# Patient Record
Sex: MALE | Race: WHITE | NOT HISPANIC OR LATINO | URBAN - METROPOLITAN AREA
[De-identification: names, ages, dates, MRNs, and addresses within clinical notes are randomized per-mention and may not be internally consistent; named-entity substitution may affect disease eponyms.]

---

## 2017-03-09 ENCOUNTER — ALLSCRIPTS OFFICE VISIT (OUTPATIENT)
Dept: OTHER | Facility: OTHER | Age: 42
End: 2017-03-09

## 2017-06-07 ENCOUNTER — ALLSCRIPTS OFFICE VISIT (OUTPATIENT)
Dept: OTHER | Facility: OTHER | Age: 42
End: 2017-06-07

## 2017-09-05 ENCOUNTER — ALLSCRIPTS OFFICE VISIT (OUTPATIENT)
Dept: OTHER | Facility: OTHER | Age: 42
End: 2017-09-05

## 2017-09-18 ENCOUNTER — ALLSCRIPTS OFFICE VISIT (OUTPATIENT)
Dept: OTHER | Facility: OTHER | Age: 42
End: 2017-09-18

## 2018-01-10 NOTE — MISCELLANEOUS
Provider Comments  Provider Comments:   PT MISSED APPOINTMENT 6/7/2017 WITH DR Ciara Huang      Signatures   Electronically signed by : Roseann Negrete, ; Jun 7 2017 11:56AM EST                       (Author)

## 2018-01-12 NOTE — MISCELLANEOUS
Provider Comments  Provider Comments:   PT MISSED APPOINTMENT 9/05/2017 WITH DR Sharon Espinal   Electronically signed by : Evette Moore, ; Sep  5 2017  9:18AM EST                       (Author)

## 2018-01-13 VITALS
HEIGHT: 69 IN | TEMPERATURE: 98 F | BODY MASS INDEX: 32.58 KG/M2 | SYSTOLIC BLOOD PRESSURE: 120 MMHG | WEIGHT: 220 LBS | DIASTOLIC BLOOD PRESSURE: 80 MMHG

## 2018-01-16 NOTE — PROGRESS NOTES
Assessment    1  Benign essential hypertension (401 1) (I10)   2  Hypercholesterolemia (272 0) (E78 00)   3  Insomnia (780 52) (G47 00)   4  IBS (irritable bowel syndrome) (564 1) (K58 9)   5  Anxiety (300 00) (F41 9)    Plan  Abdominal pain    · Omeprazole 40 MG Oral Capsule Delayed Release; TAKE 1 CAPSULE DAILY  Anxiety, Benign essential hypertension, Hypercholesterolemia, IBS (irritable bowel  syndrome), Insomnia    · Venlafaxine HCl ER 37 5 MG Oral Capsule Extended Release 24 Hour (Effexor XR);  TAKE 1 CAPSULE ONCE DAILY WITH FOOD   · Follow-up visit in 3 months Evaluation and Treatment  Follow-up  Status: Hold For -  Scheduling  Requested for: [id number]  Benign essential hypertension    · Olmesartan Medoxomil 40 MG Oral Tablet (Benicar); Take 1 tablet daily  Insomnia    · Belsomra 10 MG Oral Tablet; TAKE 1 TABLET AT  BEDTIME AS NEEDED FOR  INSOMNIA  Performance anxiety    · From  ALPRAZolam 1 MG Oral Tablet TAKE 1 TABLET TWICE DAILY To  ALPRAZolam 1 MG Oral Tablet TAKE 1 TABLET 3 TIMES DAILY    Chief Complaint  REG F/U ANXIETY, HTN, HYPERLIPIDS  PT WISHES TO DISCUSS MEDS  History of Present Illness  Pt  is here for anxiety and htn and hld  Pt  with insomnia also  Patient also here to follow-up on GERD  Which is stable  No gout related issues  No chest pain or shortness of breath or headache or visual disturbance  Patient with IBS  Patient has noticed bloating  Patient has had EGD and colonoscopy in the past which were unremarkable  Normal urination  Review of Systems    Constitutional: No fever or chills, feels well, no tiredness, no recent weight gain or weight loss  Eyes: No complaints of eye pain, no red eyes, no discharge from eyes, no itchy eyes  ENT: no complaints of earache, no hearing loss, no nosebleeds, no nasal discharge, no sore throat, no hoarseness     Cardiovascular: No complaints of slow heart rate, no fast heart rate, no chest pain, no palpitations, no leg claudication, no lower extremity  Respiratory: No complaints of shortness of breath, no wheezing, no cough, no SOB on exertion, no orthopnea or PND  Gastrointestinal: as noted in HPI  Genitourinary: No complaints of dysuria, no incontinence, no hesitancy, no nocturia, no genital lesion, no testicular pain  Musculoskeletal: No complaints of arthralgia, no myalgias, no joint swelling or stiffness, no limb pain or swelling  Integumentary: No complaints of skin rash or skin lesions, no itching, no skin wound, no dry skin  Neurological: No compliants of headache, no confusion, no convulsions, no numbness or tingling, no dizziness or fainting, no limb weakness, no difficulty walking  Psychiatric: anxiety, but as noted in HPI  Endocrine: No complaints of proptosis, no hot flashes, no muscle weakness, no erectile dysfunction, no deepening of the voice, no feelings of weakness  Hematologic/Lymphatic: No complaints of swollen glands, no swollen glands in the neck, does not bleed easily, no easy bruising  Active Problems    1  Abdominal pain (789 00) (R10 9)   2  Acute gastritis (535 00) (K29 00)   3  Acute sinusitis (461 9) (J01 90)   4  Anxiety (300 00) (F41 9)   5  Benign essential hypertension (401 1) (I10)   6  Fatigue (780 79) (R53 83)   7  Hypercholesterolemia (272 0) (E78 00)   8  Incisional hernia (553 21) (K43 2)   9  Multiple Ventricular Septal Defects (745 4)   10  Performance anxiety (300 09) (F41 8)   11  Upper respiratory infection (465 9) (J06 9)    Past Medical History    The active problems and past medical history were reviewed and updated today  Family History  Mother    1  No pertinent family history    Social History    · Never A Smoker    Current Meds   1  ALPRAZolam 1 MG Oral Tablet; TAKE 1 TABLET TWICE DAILY; Therapy: [id number] to ([facility])  Requested for: 28TTD1011; Last   Rx:[id number]; Status: ACTIVE - Renewal Denied Ordered   2  Olmesartan Medoxomil 40 MG Oral Tablet;  Take 1 tablet daily; Therapy: 71NNQ5382 to (Evaluate:06Jun2017)  Requested for: 94NYJ2504; Last   Rx:[id number] Ordered   3  Omeprazole 40 MG Oral Capsule Delayed Release; TAKE 1 CAPSULE DAILY; Therapy: [id number] to ([facility])  Requested for: 15CVG3907; Last   Rx:[id number] Ordered    The medication list was reviewed and updated today  Allergies    1  No Known Drug Allergies    Vitals  Vital Signs    Recorded: 77QJJ2329 61:02SB   Systolic 001   Diastolic 80   BP CUFF SIZE Large   Height 5 ft 9 in   Weight 226 lb    BMI Calculated 33 37   BSA Calculated 2 18     Physical Exam    Constitutional   General appearance: No acute distress, well appearing and well nourished  Eyes   Conjunctiva and lids: No swelling, erythema, or discharge  Pupils and irises: Equal, round and reactive to light  Ears, Nose, Mouth, and Throat   External inspection of ears and nose: Normal     Otoscopic examination: Tympanic membrance translucent with normal light reflex  Canals patent without erythema  Nasal mucosa, septum, and turbinates: Normal without edema or erythema  Oropharynx: Normal with no erythema, edema, exudate or lesions  Pulmonary   Respiratory effort: No increased work of breathing or signs of respiratory distress  Auscultation of lungs: Clear to auscultation, equal breath sounds bilaterally, no wheezes, no rales, no rhonci  Cardiovascular   Palpation of heart: Normal PMI, no thrills  Auscultation of heart: Normal rate and rhythm, normal S1 and S2, without murmurs  Examination of extremities for edema and/or varicosities: Normal     Carotid pulses: Normal     Abdomen   Abdomen: Non-tender, no masses  Liver and spleen: No hepatomegaly or splenomegaly  Lymphatic   Palpation of lymph nodes in neck: No lymphadenopathy  Musculoskeletal   Gait and station: Normal     Digits and nails: Normal without clubbing or cyanosis      Inspection/palpation of joints, bones, and muscles: Normal     Skin Skin and subcutaneous tissue: Normal without rashes or lesions  Neurologic   Cranial nerves: Cranial nerves 2-12 intact  Reflexes: 2+ and symmetric  Sensation: No sensory loss      Psychiatric   Orientation to person, place and time: Normal     Mood and affect: Normal          Signatures   Electronically signed by : Radha Owusu DO; Mar  9 2017 11:05AM EST                       (Author)

## 2018-01-22 VITALS
DIASTOLIC BLOOD PRESSURE: 80 MMHG | WEIGHT: 226 LBS | HEIGHT: 69 IN | BODY MASS INDEX: 33.47 KG/M2 | SYSTOLIC BLOOD PRESSURE: 120 MMHG

## 2018-03-08 DIAGNOSIS — F41.9 ANXIETY: Primary | ICD-10-CM

## 2018-03-08 RX ORDER — ALPRAZOLAM 1 MG/1
1 TABLET ORAL 3 TIMES DAILY
COMMUNITY
Start: 2016-07-06 | End: 2018-03-08 | Stop reason: SDUPTHER

## 2018-03-08 RX ORDER — ALPRAZOLAM 1 MG/1
TABLET ORAL
Qty: 90 TABLET | Refills: 4 | OUTPATIENT
Start: 2018-03-08

## 2018-03-08 RX ORDER — ALPRAZOLAM 1 MG/1
1 TABLET ORAL 3 TIMES DAILY
Qty: 90 TABLET | Refills: 0 | OUTPATIENT
Start: 2018-03-08 | End: 2018-03-09 | Stop reason: SDUPTHER

## 2018-03-09 DIAGNOSIS — F41.9 ANXIETY: ICD-10-CM

## 2018-03-09 RX ORDER — ALPRAZOLAM 1 MG/1
1 TABLET ORAL 3 TIMES DAILY
Qty: 90 TABLET | Refills: 0 | OUTPATIENT
Start: 2018-03-09 | End: 2018-04-02 | Stop reason: SDUPTHER

## 2018-03-30 RX ORDER — GABAPENTIN 300 MG/1
1 CAPSULE ORAL
COMMUNITY
Start: 2017-09-18 | End: 2018-04-02

## 2018-03-30 RX ORDER — OMEPRAZOLE 40 MG/1
1 CAPSULE, DELAYED RELEASE ORAL DAILY
COMMUNITY
Start: 2016-07-06 | End: 2018-04-02 | Stop reason: SDUPTHER

## 2018-03-30 RX ORDER — OLMESARTAN MEDOXOMIL 40 MG/1
1 TABLET ORAL DAILY
COMMUNITY
Start: 2017-09-22 | End: 2018-04-02 | Stop reason: SDUPTHER

## 2018-04-02 ENCOUNTER — OFFICE VISIT (OUTPATIENT)
Dept: FAMILY MEDICINE CLINIC | Facility: CLINIC | Age: 43
End: 2018-04-02
Payer: COMMERCIAL

## 2018-04-02 VITALS
DIASTOLIC BLOOD PRESSURE: 76 MMHG | HEART RATE: 77 BPM | SYSTOLIC BLOOD PRESSURE: 118 MMHG | WEIGHT: 215 LBS | BODY MASS INDEX: 30.78 KG/M2 | OXYGEN SATURATION: 98 % | HEIGHT: 70 IN | TEMPERATURE: 98.1 F

## 2018-04-02 DIAGNOSIS — I10 BENIGN ESSENTIAL HYPERTENSION: Primary | ICD-10-CM

## 2018-04-02 DIAGNOSIS — F41.9 ANXIETY: ICD-10-CM

## 2018-04-02 DIAGNOSIS — K29.00 ACUTE SUPERFICIAL GASTRITIS WITHOUT HEMORRHAGE: ICD-10-CM

## 2018-04-02 PROCEDURE — 99214 OFFICE O/P EST MOD 30 MIN: CPT | Performed by: FAMILY MEDICINE

## 2018-04-02 RX ORDER — ALPRAZOLAM 1 MG/1
1 TABLET ORAL 3 TIMES DAILY
Qty: 270 TABLET | Refills: 1 | OUTPATIENT
Start: 2018-04-02 | End: 2018-04-02 | Stop reason: SDUPTHER

## 2018-04-02 RX ORDER — OLMESARTAN MEDOXOMIL 40 MG/1
40 TABLET ORAL DAILY
Qty: 90 TABLET | Refills: 1 | Status: SHIPPED | OUTPATIENT
Start: 2018-04-02 | End: 2019-12-31 | Stop reason: CLARIF

## 2018-04-02 RX ORDER — ALPRAZOLAM 1 MG/1
1 TABLET ORAL 3 TIMES DAILY
Qty: 270 TABLET | Refills: 0 | Status: SHIPPED | OUTPATIENT
Start: 2018-04-02 | End: 2018-06-27 | Stop reason: SDUPTHER

## 2018-04-02 RX ORDER — OMEPRAZOLE 40 MG/1
40 CAPSULE, DELAYED RELEASE ORAL DAILY
Qty: 90 CAPSULE | Refills: 1 | Status: SHIPPED | OUTPATIENT
Start: 2018-04-02 | End: 2018-10-04 | Stop reason: SDUPTHER

## 2018-04-02 RX ORDER — OLMESARTAN MEDOXOMIL 40 MG/1
40 TABLET ORAL DAILY
Qty: 90 TABLET | Refills: 1 | Status: SHIPPED | OUTPATIENT
Start: 2018-04-02 | End: 2018-04-02 | Stop reason: SDUPTHER

## 2018-04-02 NOTE — PROGRESS NOTES
Assessment/Plan:   refills on all medication given  Patient doing well overall  Patient will follow up in 6 months  No problem-specific Assessment & Plan notes found for this encounter  Diagnoses and all orders for this visit:    Benign essential hypertension  -     olmesartan (BENICAR) 40 mg tablet; Take 1 tablet (40 mg total) by mouth daily    Anxiety  -     ALPRAZolam (XANAX) 1 mg tablet; Take 1 tablet (1 mg total) by mouth 3 (three) times a day for 30 days    Acute superficial gastritis without hemorrhage  -     omeprazole (PriLOSEC) 40 MG capsule; Take 1 capsule (40 mg total) by mouth daily    Other orders  -     Discontinue: gabapentin (NEURONTIN) 300 mg capsule; Take 1 capsule by mouth  -     Discontinue: olmesartan (BENICAR) 40 mg tablet; Take 1 tablet by mouth daily  -     Discontinue: omeprazole (PriLOSEC) 40 MG capsule; Take 1 capsule by mouth daily          Subjective:      Patient ID: Alexander Ritter is a 37 y o  male  Patient follow-up on hypertension and anxiety and gastritis  No chest pain or shortness of breath or abdominal pain  Patient does use roughly 3-4 Xanax tablets daily  No change in urination or defecation  All review systems negative  The following portions of the patient's history were reviewed and updated as appropriate: allergies, current medications, past family history, past medical history, past social history, past surgical history and problem list     Review of Systems   Constitutional: Negative  HENT: Negative  Eyes: Negative  Respiratory: Negative  Cardiovascular: Negative  Gastrointestinal: Negative  Endocrine: Negative  Genitourinary: Negative  Musculoskeletal: Negative  Skin: Negative  Allergic/Immunologic: Negative  Neurological: Negative  Hematological: Negative  Psychiatric/Behavioral: Negative            Objective:      /76 (BP Location: Right arm, Patient Position: Sitting, Cuff Size: Large)   Pulse 77 Temp 98 1 °F (36 7 °C) (Tympanic)   Ht 5' 10" (1 778 m)   Wt 97 5 kg (215 lb)   SpO2 98%   BMI 30 85 kg/m²          Physical Exam   Constitutional: He is oriented to person, place, and time  He appears well-developed and well-nourished  No distress  HENT:   Head: Normocephalic  Right Ear: External ear normal    Left Ear: External ear normal    Mouth/Throat: Oropharynx is clear and moist  No oropharyngeal exudate  Eyes: EOM are normal  Pupils are equal, round, and reactive to light  Right eye exhibits no discharge  Left eye exhibits no discharge  No scleral icterus  Neck: Normal range of motion  Neck supple  No thyromegaly present  Cardiovascular: Normal rate, regular rhythm, normal heart sounds and intact distal pulses  Exam reveals no gallop and no friction rub  No murmur heard  Pulmonary/Chest: Effort normal and breath sounds normal  No respiratory distress  He has no wheezes  He has no rales  He exhibits no tenderness  Abdominal: Soft  Bowel sounds are normal  He exhibits no distension  There is no tenderness  There is no rebound and no guarding  Musculoskeletal: Normal range of motion  He exhibits no edema or tenderness  Lymphadenopathy:     He has no cervical adenopathy  Neurological: He is oriented to person, place, and time  No cranial nerve deficit  He exhibits normal muscle tone  Coordination normal    Skin: Skin is warm and dry  No rash noted  He is not diaphoretic  No erythema  No pallor  Psychiatric: He has a normal mood and affect  His behavior is normal  Judgment and thought content normal    Nursing note and vitals reviewed

## 2018-06-27 ENCOUNTER — OFFICE VISIT (OUTPATIENT)
Dept: FAMILY MEDICINE CLINIC | Facility: CLINIC | Age: 43
End: 2018-06-27
Payer: COMMERCIAL

## 2018-06-27 VITALS
WEIGHT: 214.6 LBS | HEIGHT: 70 IN | TEMPERATURE: 97.6 F | SYSTOLIC BLOOD PRESSURE: 118 MMHG | DIASTOLIC BLOOD PRESSURE: 80 MMHG | BODY MASS INDEX: 30.72 KG/M2

## 2018-06-27 DIAGNOSIS — F41.9 ANXIETY: ICD-10-CM

## 2018-06-27 DIAGNOSIS — A08.4 VIRAL GASTROENTERITIS: ICD-10-CM

## 2018-06-27 DIAGNOSIS — K29.00 ACUTE SUPERFICIAL GASTRITIS WITHOUT HEMORRHAGE: Primary | ICD-10-CM

## 2018-06-27 PROCEDURE — 99213 OFFICE O/P EST LOW 20 MIN: CPT | Performed by: FAMILY MEDICINE

## 2018-06-27 RX ORDER — ALPRAZOLAM 1 MG/1
TABLET ORAL
Qty: 90 TABLET | Refills: 0 | OUTPATIENT
Start: 2018-06-27

## 2018-06-27 RX ORDER — ALPRAZOLAM 1 MG/1
1 TABLET ORAL 3 TIMES DAILY
Qty: 270 TABLET | Refills: 1 | Status: SHIPPED | OUTPATIENT
Start: 2018-06-27 | End: 2018-12-10 | Stop reason: SDUPTHER

## 2018-06-27 RX ORDER — ONDANSETRON 4 MG/1
4 TABLET, ORALLY DISINTEGRATING ORAL EVERY 6 HOURS PRN
Qty: 20 TABLET | Refills: 1 | Status: SHIPPED | OUTPATIENT
Start: 2018-06-27 | End: 2019-01-09

## 2018-06-27 NOTE — PROGRESS NOTES
Assessment/Plan:  Patient will use Zofran as needed for nausea  Guidance given  Patient will have Xanax refilled regarding ongoing history of anxiety  Patient will see GI if not improving  Follow-up in 6 months       Diagnoses and all orders for this visit:    Acute superficial gastritis without hemorrhage    Anxiety  -     ALPRAZolam (XANAX) 1 mg tablet; Take 1 tablet (1 mg total) by mouth 3 (three) times a day for 90 days    Viral gastroenteritis  -     ondansetron (ZOFRAN-ODT) 4 mg disintegrating tablet; Take 1 tablet (4 mg total) by mouth every 6 (six) hours as needed for nausea or vomiting          Subjective:      Patient ID: Laila Owens is a 37 y o  male  Patient is here to follow-up on nausea for which began over the weekend  Patient with significant belching  No vomiting  The patient also had diarrhea  Patient ran out of Xanax over the weekend also  Patient has left for roughly 30 hr   Patient received 2 doses of Zofran  This is helped  No significant abdominal pain  No chest pain or shortness of breath  The patient also with history of anxiety and uses Xanax 3 times daily routinely  Refills needed  The patient with history of IBS  The following portions of the patient's history were reviewed and updated as appropriate: allergies, current medications, past family history, past medical history, past social history, past surgical history and problem list     Review of Systems   Constitutional: Negative  HENT: Negative  Eyes: Negative  Respiratory: Negative  Cardiovascular: Negative  Gastrointestinal: Positive for diarrhea and nausea  Negative for abdominal pain and vomiting  Endocrine: Negative  Genitourinary: Negative  Musculoskeletal: Negative  Skin: Negative  Allergic/Immunologic: Negative  Neurological: Negative  Hematological: Negative  Psychiatric/Behavioral: Negative            Objective:      /80 (BP Location: Left arm, Patient Position: Sitting, Cuff Size: Adult)   Temp 97 6 °F (36 4 °C) (Tympanic)   Ht 5' 9 5" (1 765 m)   Wt 97 3 kg (214 lb 9 6 oz)   BMI 31 24 kg/m²          Physical Exam   Constitutional: He is oriented to person, place, and time  He appears well-developed and well-nourished  No distress  HENT:   Head: Normocephalic  Right Ear: External ear normal    Left Ear: External ear normal    Mouth/Throat: Oropharynx is clear and moist  No oropharyngeal exudate  Eyes: EOM are normal  Pupils are equal, round, and reactive to light  Right eye exhibits no discharge  Left eye exhibits no discharge  No scleral icterus  Neck: Normal range of motion  Neck supple  No thyromegaly present  Cardiovascular: Normal rate, regular rhythm, normal heart sounds and intact distal pulses  Exam reveals no gallop and no friction rub  No murmur heard  Pulmonary/Chest: Effort normal and breath sounds normal  No respiratory distress  He has no wheezes  He has no rales  He exhibits no tenderness  Abdominal: Soft  Bowel sounds are normal  He exhibits no distension  There is no tenderness  There is no rebound and no guarding  Musculoskeletal: Normal range of motion  He exhibits no edema or tenderness  Lymphadenopathy:     He has no cervical adenopathy  Neurological: He is oriented to person, place, and time  No cranial nerve deficit  He exhibits normal muscle tone  Coordination normal    Skin: Skin is warm and dry  No rash noted  He is not diaphoretic  No erythema  No pallor  Psychiatric: He has a normal mood and affect  His behavior is normal  Judgment and thought content normal    Nursing note and vitals reviewed

## 2018-08-06 ENCOUNTER — OFFICE VISIT (OUTPATIENT)
Dept: FAMILY MEDICINE CLINIC | Facility: CLINIC | Age: 43
End: 2018-08-06
Payer: COMMERCIAL

## 2018-08-06 VITALS
RESPIRATION RATE: 14 BRPM | HEIGHT: 70 IN | DIASTOLIC BLOOD PRESSURE: 60 MMHG | BODY MASS INDEX: 30.69 KG/M2 | HEART RATE: 71 BPM | SYSTOLIC BLOOD PRESSURE: 106 MMHG | WEIGHT: 214.4 LBS | OXYGEN SATURATION: 99 %

## 2018-08-06 DIAGNOSIS — K21.00 GASTROESOPHAGEAL REFLUX DISEASE WITH ESOPHAGITIS: Primary | ICD-10-CM

## 2018-08-06 DIAGNOSIS — F41.9 ANXIETY: ICD-10-CM

## 2018-08-06 DIAGNOSIS — I10 BENIGN ESSENTIAL HYPERTENSION: ICD-10-CM

## 2018-08-06 DIAGNOSIS — K58.0 IRRITABLE BOWEL SYNDROME WITH DIARRHEA: ICD-10-CM

## 2018-08-06 PROCEDURE — 1036F TOBACCO NON-USER: CPT | Performed by: FAMILY MEDICINE

## 2018-08-06 PROCEDURE — 3008F BODY MASS INDEX DOCD: CPT | Performed by: FAMILY MEDICINE

## 2018-08-06 PROCEDURE — 3074F SYST BP LT 130 MM HG: CPT | Performed by: FAMILY MEDICINE

## 2018-08-06 PROCEDURE — 99214 OFFICE O/P EST MOD 30 MIN: CPT | Performed by: FAMILY MEDICINE

## 2018-08-06 PROCEDURE — 3078F DIAST BP <80 MM HG: CPT | Performed by: FAMILY MEDICINE

## 2018-08-06 RX ORDER — SERTRALINE HYDROCHLORIDE 100 MG/1
100 TABLET, FILM COATED ORAL DAILY
Qty: 90 TABLET | Refills: 0 | Status: SHIPPED | OUTPATIENT
Start: 2018-08-06 | End: 2019-01-09

## 2018-08-06 NOTE — PROGRESS NOTES
Assessment/Plan:      Started Zoloft today to see if that helps his symptoms of anxiety and IBS  I referred him to GI for evaluation possible EGD and colonoscopy  I will let them decide on medication for the IBS  Follow-up with Dr Alexandrea Vasquez in 2 weeks  Diagnoses and all orders for this visit:    Gastroesophageal reflux disease with esophagitis  -     Ambulatory referral to Gastroenterology; Future    Benign essential hypertension    Anxiety  -     sertraline (ZOLOFT) 100 mg tablet; Take 1 tablet (100 mg total) by mouth daily for 90 days    Irritable bowel syndrome with diarrhea  -     Ambulatory referral to Gastroenterology; Future          Subjective:      Patient ID: Sandip Sanchez is a 37 y o  male  Margy Carlson in today complains of recurrent abdominal discomfort  Is waking him from sleep he says he feels cold and clammy but does not have a fever  The symptoms required and he was sent home from work  Does complain of nausea but no vomiting  He does suffer from IBS and has diarrhea  The following portions of the patient's history were reviewed and updated as appropriate: allergies, current medications, past family history, past medical history, past social history, past surgical history and problem list     Review of Systems   Constitutional: Positive for activity change, appetite change and chills  Negative for fever and unexpected weight change  HENT: Negative  Eyes: Negative  Respiratory: Negative  Cardiovascular: Negative  Gastrointestinal: Positive for diarrhea and nausea  Negative for abdominal distention, abdominal pain, anal bleeding, blood in stool, constipation, rectal pain and vomiting  Genitourinary: Negative  Musculoskeletal: Negative  Neurological: Negative  Psychiatric/Behavioral: The patient is nervous/anxious            Objective:      /60 (BP Location: Right arm, Patient Position: Sitting, Cuff Size: Large)   Pulse 71   Resp 14   Ht 5' 9 5" (1 765 m) Wt 97 3 kg (214 lb 6 4 oz)   SpO2 99%   BMI 31 21 kg/m²          Physical Exam   Constitutional: He is oriented to person, place, and time  He appears well-developed and well-nourished  HENT:   Head: Normocephalic and atraumatic  Right Ear: External ear normal    Left Ear: External ear normal    Nose: Nose normal    Mouth/Throat: Oropharynx is clear and moist    Eyes: Conjunctivae and EOM are normal  Pupils are equal, round, and reactive to light  Neck: Normal range of motion  Neck supple  Cardiovascular: Normal rate, regular rhythm and normal heart sounds  Pulmonary/Chest: Effort normal and breath sounds normal    Abdominal: Soft  Bowel sounds are normal  He exhibits distension  He exhibits no mass  There is tenderness  There is no rebound and no guarding  Musculoskeletal: Normal range of motion  Neurological: He is alert and oriented to person, place, and time  He has normal reflexes  Skin: Skin is warm and dry  Psychiatric: He has a normal mood and affect  His behavior is normal    Nursing note and vitals reviewed

## 2018-08-06 NOTE — LETTER
August 6, 2018     Patient: Noble Hickey   YOB: 1975   Date of Visit: 8/6/2018       To Whom it May Concern:    Papito Alexandre is under my professional care  He was seen in my office on 8/6/2018  He may return to work on Tuesday August 7, 2018  If you have any questions or concerns, please don't hesitate to call           Sincerely,          Isom Severin, DO        CC: No Recipients

## 2018-10-04 DIAGNOSIS — K29.00 ACUTE SUPERFICIAL GASTRITIS WITHOUT HEMORRHAGE: ICD-10-CM

## 2018-10-05 RX ORDER — OMEPRAZOLE 40 MG/1
CAPSULE, DELAYED RELEASE ORAL
Qty: 30 CAPSULE | Refills: 0 | Status: SHIPPED | OUTPATIENT
Start: 2018-10-05 | End: 2018-11-22 | Stop reason: SDUPTHER

## 2018-11-22 DIAGNOSIS — K29.00 ACUTE SUPERFICIAL GASTRITIS WITHOUT HEMORRHAGE: ICD-10-CM

## 2018-11-23 RX ORDER — OMEPRAZOLE 40 MG/1
CAPSULE, DELAYED RELEASE ORAL
Qty: 30 CAPSULE | Refills: 0 | Status: SHIPPED | OUTPATIENT
Start: 2018-11-23 | End: 2019-01-03 | Stop reason: SDUPTHER

## 2018-12-10 DIAGNOSIS — F41.9 ANXIETY: ICD-10-CM

## 2018-12-10 RX ORDER — ALPRAZOLAM 1 MG/1
TABLET ORAL
Qty: 90 TABLET | Refills: 0 | Status: SHIPPED | OUTPATIENT
Start: 2018-12-10 | End: 2019-01-09 | Stop reason: SDUPTHER

## 2019-01-03 DIAGNOSIS — K29.00 ACUTE SUPERFICIAL GASTRITIS WITHOUT HEMORRHAGE: ICD-10-CM

## 2019-01-03 RX ORDER — OMEPRAZOLE 40 MG/1
CAPSULE, DELAYED RELEASE ORAL
Qty: 30 CAPSULE | Refills: 0 | Status: SHIPPED | OUTPATIENT
Start: 2019-01-03 | End: 2019-02-12 | Stop reason: SDUPTHER

## 2019-01-09 ENCOUNTER — OFFICE VISIT (OUTPATIENT)
Dept: FAMILY MEDICINE CLINIC | Facility: CLINIC | Age: 44
End: 2019-01-09
Payer: COMMERCIAL

## 2019-01-09 VITALS
DIASTOLIC BLOOD PRESSURE: 72 MMHG | WEIGHT: 202 LBS | SYSTOLIC BLOOD PRESSURE: 112 MMHG | HEIGHT: 70 IN | BODY MASS INDEX: 28.92 KG/M2 | TEMPERATURE: 98.6 F

## 2019-01-09 DIAGNOSIS — K29.00 ACUTE SUPERFICIAL GASTRITIS WITHOUT HEMORRHAGE: ICD-10-CM

## 2019-01-09 DIAGNOSIS — K58.0 IRRITABLE BOWEL SYNDROME WITH DIARRHEA: ICD-10-CM

## 2019-01-09 DIAGNOSIS — I10 BENIGN ESSENTIAL HYPERTENSION: Primary | ICD-10-CM

## 2019-01-09 DIAGNOSIS — F41.9 ANXIETY: ICD-10-CM

## 2019-01-09 PROCEDURE — 1036F TOBACCO NON-USER: CPT | Performed by: FAMILY MEDICINE

## 2019-01-09 PROCEDURE — 3008F BODY MASS INDEX DOCD: CPT | Performed by: FAMILY MEDICINE

## 2019-01-09 PROCEDURE — 3078F DIAST BP <80 MM HG: CPT | Performed by: FAMILY MEDICINE

## 2019-01-09 PROCEDURE — 99214 OFFICE O/P EST MOD 30 MIN: CPT | Performed by: FAMILY MEDICINE

## 2019-01-09 PROCEDURE — 3074F SYST BP LT 130 MM HG: CPT | Performed by: FAMILY MEDICINE

## 2019-01-09 RX ORDER — DULOXETIN HYDROCHLORIDE 30 MG/1
30 CAPSULE, DELAYED RELEASE ORAL DAILY
Qty: 30 CAPSULE | Refills: 5 | Status: SHIPPED | OUTPATIENT
Start: 2019-01-09 | End: 2019-04-26

## 2019-01-09 RX ORDER — ONDANSETRON 4 MG/1
4 TABLET, FILM COATED ORAL EVERY 8 HOURS PRN
Qty: 30 TABLET | Refills: 0 | Status: SHIPPED | OUTPATIENT
Start: 2019-01-09 | End: 2019-05-13 | Stop reason: SDUPTHER

## 2019-01-09 RX ORDER — ALPRAZOLAM 1 MG/1
1 TABLET ORAL 4 TIMES DAILY PRN
Qty: 120 TABLET | Refills: 2 | Status: SHIPPED | OUTPATIENT
Start: 2019-01-09 | End: 2019-04-22 | Stop reason: SDUPTHER

## 2019-01-09 NOTE — PROGRESS NOTES
Assessment/Plan:  Patient will see GI on Monday for gastritis and IBS  Patient will use Zofran as needed  Refills given  Patient will continue with Benicar for hypertension which is stable at the present time  May need to change medication in the near future  Patient have Xanax increased to 4 times daily  The patient will start Cymbalta  Diagnoses and all orders for this visit:    Benign essential hypertension  -     CBC and differential; Future  -     Comprehensive metabolic panel; Future  -     Lipid panel; Future  -     TSH, 3rd generation with Free T4 reflex; Future    Anxiety  -     ALPRAZolam (XANAX) 1 mg tablet; Take 1 tablet (1 mg total) by mouth 4 (four) times a day as needed for anxiety  -     DULoxetine (CYMBALTA) 30 mg delayed release capsule; Take 1 capsule (30 mg total) by mouth daily    Irritable bowel syndrome with diarrhea  -     ondansetron (ZOFRAN) 4 mg tablet; Take 1 tablet (4 mg total) by mouth every 8 (eight) hours as needed for nausea or vomiting    Acute superficial gastritis without hemorrhage  -     Lipase; Future          Subjective:      Patient ID: Tanner Ochoa is a 37 y o  male  Patient here to follow-up on gastritis, IBS, anxiety as well as hypertension  Patient did not feel well on salt and stop medication  Patient feels he needs roughly 4 Xanax daily  The patient with nausea but no vomiting  Patient is seeing GI  Monday  The following portions of the patient's history were reviewed and updated as appropriate: allergies, current medications, past family history, past medical history, past social history, past surgical history and problem list     Review of Systems   Constitutional: Negative  HENT: Negative  Eyes: Negative  Respiratory: Negative  Cardiovascular: Negative  Gastrointestinal: Positive for nausea  Endocrine: Negative  Genitourinary: Negative  Musculoskeletal: Negative  Skin: Negative  Allergic/Immunologic: Negative  Neurological: Negative  Hematological: Negative  Psychiatric/Behavioral: Positive for agitation  Objective:      /72 (BP Location: Right arm, Patient Position: Sitting)   Temp 98 6 °F (37 °C)   Ht 5' 9 5" (1 765 m)   Wt 91 6 kg (202 lb)   BMI 29 40 kg/m²          Physical Exam   Constitutional: He is oriented to person, place, and time  He appears well-developed and well-nourished  No distress  HENT:   Head: Normocephalic  Right Ear: External ear normal    Left Ear: External ear normal    Mouth/Throat: Oropharynx is clear and moist  No oropharyngeal exudate  Eyes: Pupils are equal, round, and reactive to light  EOM are normal  Right eye exhibits no discharge  Left eye exhibits no discharge  No scleral icterus  Neck: Normal range of motion  Neck supple  No thyromegaly present  Cardiovascular: Normal rate, regular rhythm, normal heart sounds and intact distal pulses  Exam reveals no gallop and no friction rub  No murmur heard  Pulmonary/Chest: Effort normal and breath sounds normal  No respiratory distress  He has no wheezes  He has no rales  He exhibits no tenderness  Abdominal: Soft  Bowel sounds are normal  He exhibits distension  There is no tenderness  There is no rebound and no guarding  Musculoskeletal: Normal range of motion  He exhibits no edema or tenderness  Lymphadenopathy:     He has no cervical adenopathy  Neurological: He is oriented to person, place, and time  No cranial nerve deficit  He exhibits normal muscle tone  Coordination normal    Skin: Skin is warm and dry  No rash noted  He is not diaphoretic  No erythema  No pallor  Psychiatric: He has a normal mood and affect  His behavior is normal  Judgment and thought content normal    Nursing note and vitals reviewed

## 2019-01-14 DIAGNOSIS — I10 BENIGN ESSENTIAL HYPERTENSION: ICD-10-CM

## 2019-01-14 RX ORDER — OLMESARTAN MEDOXOMIL 40 MG/1
TABLET ORAL
Qty: 90 TABLET | Refills: 1 | Status: SHIPPED | OUTPATIENT
Start: 2019-01-14 | End: 2019-04-26

## 2019-02-08 ENCOUNTER — TELEPHONE (OUTPATIENT)
Dept: FAMILY MEDICINE CLINIC | Facility: CLINIC | Age: 44
End: 2019-02-08

## 2019-02-08 NOTE — TELEPHONE ENCOUNTER
PT'S WIFE CALLED - SHE SAID HER  IS IN A LOT OF PAIN STILL AND HE IS REQUESTING SOMETHING FOR HIS PAIN ,HE WAS SEEN IN January, HE IS UNABLE TO SLEEP AND IS VERY UNCOMFORTABLE DURING THE DAY    [address]

## 2019-02-12 DIAGNOSIS — K29.00 ACUTE SUPERFICIAL GASTRITIS WITHOUT HEMORRHAGE: ICD-10-CM

## 2019-02-13 RX ORDER — OMEPRAZOLE 40 MG/1
CAPSULE, DELAYED RELEASE ORAL
Qty: 30 CAPSULE | Refills: 0 | Status: SHIPPED | OUTPATIENT
Start: 2019-02-13 | End: 2019-05-09 | Stop reason: SDUPTHER

## 2019-03-21 ENCOUNTER — OFFICE VISIT (OUTPATIENT)
Dept: FAMILY MEDICINE CLINIC | Facility: CLINIC | Age: 44
End: 2019-03-21
Payer: COMMERCIAL

## 2019-03-21 VITALS
WEIGHT: 193 LBS | SYSTOLIC BLOOD PRESSURE: 120 MMHG | TEMPERATURE: 98.8 F | OXYGEN SATURATION: 99 % | DIASTOLIC BLOOD PRESSURE: 70 MMHG | BODY MASS INDEX: 28.09 KG/M2 | HEART RATE: 74 BPM
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
## 2019-03-21 PROCEDURE — 1036F TOBACCO NON-USER: CPT | Performed by: FAMILY MEDICINE

## 2019-03-21 PROCEDURE — 99213 OFFICE O/P EST LOW 20 MIN: CPT | Performed by: FAMILY MEDICINE

## 2019-03-21 NOTE — PROGRESS NOTES
Assessment/Plan:    51-year-old male with:  Tick bite  Discussed workup and treatment options with risks and benefits  Discussed the importance of daily skin checks and removing tics soon as he finds them  Patient opts as it was attached likely less than 24 hours and he is asymptomatic to observe at this time  However he will call back if he develops any symptoms including the bull's eye rash  Patient does opt to do blood work in 4-6 weeks to be on the safe side  Follow-up p r n  No problem-specific Assessment & Plan notes found for this encounter  Diagnoses and all orders for this visit:    Tick bite, initial encounter  -     Lyme Antibody Profile with reflex to WB; Future  -     CBC and differential; Future  -     C-reactive protein; Future          Subjective:     Chief Complaint   Patient presents with   [address]        Patient ID: Farooq Freitas is a 40 y o  male  Patient is a 51-year-old male who presents for follow-up after tick bite yesterday  He noticed it spontaneously in the shower  Patient feels that it was attached less than 24 hours  No fevers chills nausea vomiting  Tolerating p o  intake  Patient denies having been hiking said he was working a only in his yard  The following portions of the patient's history were reviewed and updated as appropriate: allergies, current medications, past family history, past medical history, past social history, past surgical history and problem list     Review of Systems   Constitutional: Negative  HENT: Negative  Eyes: Negative  Respiratory: Negative  Cardiovascular: Negative  Gastrointestinal: Negative  Endocrine: Negative  Genitourinary: Negative  Musculoskeletal: Negative  Allergic/Immunologic: Negative  Neurological: Negative  Hematological: Negative  Psychiatric/Behavioral: Negative  All other systems reviewed and are negative          Objective:      /70   Pulse 74   Temp 98 8 °F (37 1 °C)   Wt 87 5 kg (193 lb)   SpO2 99%   BMI 28 09 kg/m²          Physical Exam   Constitutional: He is oriented to person, place, and time  He appears well-developed and well-nourished  HENT:   Head: Atraumatic  Right Ear: External ear normal    Left Ear: External ear normal    Eyes: Pupils are equal, round, and reactive to light  Conjunctivae and EOM are normal    Neck: Normal range of motion  Pulmonary/Chest: Effort normal  No respiratory distress  Abdominal: He exhibits no distension  Musculoskeletal: Normal range of motion  Neurological: He is alert and oriented to person, place, and time  No cranial nerve deficit  Skin: Skin is warm and dry  2 mm of scant erythema surrounding the tick bite site   Psychiatric: He has a normal mood and affect   His behavior is normal  Judgment and thought content normal

## 2019-04-22 DIAGNOSIS — F41.9 ANXIETY: ICD-10-CM

## 2019-04-22 RX ORDER — ALPRAZOLAM 1 MG/1
TABLET ORAL
Qty: 120 TABLET | Refills: 1 | Status: SHIPPED | OUTPATIENT
Start: 2019-04-22 | End: 2019-05-20 | Stop reason: SDUPTHER

## 2019-04-25 ENCOUNTER — TELEPHONE (OUTPATIENT)
Dept: FAMILY MEDICINE CLINIC | Facility: CLINIC | Age: 44
End: 2019-04-25

## 2019-04-26 ENCOUNTER — OFFICE VISIT (OUTPATIENT)
Dept: FAMILY MEDICINE CLINIC | Facility: CLINIC | Age: 44
End: 2019-04-26
Payer: COMMERCIAL

## 2019-04-26 VITALS
TEMPERATURE: 97.6 F | DIASTOLIC BLOOD PRESSURE: 62 MMHG | SYSTOLIC BLOOD PRESSURE: 102 MMHG | BODY MASS INDEX: 25.77 KG/M2 | WEIGHT: 180 LBS | HEIGHT: 70 IN

## 2019-04-26 DIAGNOSIS — F41.9 ANXIETY: ICD-10-CM

## 2019-04-26 DIAGNOSIS — I10 BENIGN ESSENTIAL HYPERTENSION: Primary | ICD-10-CM

## 2019-04-26 PROCEDURE — 3074F SYST BP LT 130 MM HG: CPT | Performed by: FAMILY MEDICINE

## 2019-04-26 PROCEDURE — 99213 OFFICE O/P EST LOW 20 MIN: CPT | Performed by: FAMILY MEDICINE

## 2019-04-26 PROCEDURE — 3078F DIAST BP <80 MM HG: CPT | Performed by: FAMILY MEDICINE

## 2019-05-09 DIAGNOSIS — K29.00 ACUTE SUPERFICIAL GASTRITIS WITHOUT HEMORRHAGE: ICD-10-CM

## 2019-05-09 RX ORDER — OMEPRAZOLE 40 MG/1
CAPSULE, DELAYED RELEASE ORAL
Qty: 90 CAPSULE | Refills: 1 | Status: SHIPPED | OUTPATIENT
Start: 2019-05-09 | End: 2020-01-02 | Stop reason: SDUPTHER

## 2019-05-13 ENCOUNTER — OFFICE VISIT (OUTPATIENT)
Dept: FAMILY MEDICINE CLINIC | Facility: CLINIC | Age: 44
End: 2019-05-13
Payer: COMMERCIAL

## 2019-05-13 ENCOUNTER — DOCUMENTATION (OUTPATIENT)
Dept: FAMILY MEDICINE CLINIC | Facility: CLINIC | Age: 44
End: 2019-05-13

## 2019-05-13 VITALS
HEIGHT: 70 IN | SYSTOLIC BLOOD PRESSURE: 118 MMHG | WEIGHT: 181 LBS | DIASTOLIC BLOOD PRESSURE: 80 MMHG | BODY MASS INDEX: 25.91 KG/M2 | TEMPERATURE: 97.8 F

## 2019-05-13 DIAGNOSIS — K58.0 IRRITABLE BOWEL SYNDROME WITH DIARRHEA: ICD-10-CM

## 2019-05-13 DIAGNOSIS — F41.9 ANXIETY: Primary | ICD-10-CM

## 2019-05-13 PROCEDURE — 99213 OFFICE O/P EST LOW 20 MIN: CPT | Performed by: FAMILY MEDICINE

## 2019-05-13 RX ORDER — ONDANSETRON 4 MG/1
4 TABLET, FILM COATED ORAL EVERY 8 HOURS PRN
Qty: 30 TABLET | Refills: 3 | Status: SHIPPED | OUTPATIENT
Start: 2019-05-13 | End: 2020-01-02

## 2019-05-13 RX ORDER — PAROXETINE 10 MG/1
10 TABLET, FILM COATED ORAL DAILY
Qty: 30 TABLET | Refills: 1 | Status: SHIPPED | OUTPATIENT
Start: 2019-05-13 | End: 2019-05-22

## 2019-05-20 DIAGNOSIS — F41.9 ANXIETY: ICD-10-CM

## 2019-05-20 RX ORDER — ALPRAZOLAM 1 MG/1
TABLET ORAL
Qty: 120 TABLET | Refills: 0 | Status: SHIPPED | OUTPATIENT
Start: 2019-05-20 | End: 2019-06-28 | Stop reason: SDUPTHER

## 2019-05-22 ENCOUNTER — OFFICE VISIT (OUTPATIENT)
Dept: FAMILY MEDICINE CLINIC | Facility: CLINIC | Age: 44
End: 2019-05-22
Payer: COMMERCIAL

## 2019-05-22 VITALS
HEIGHT: 70 IN | WEIGHT: 180 LBS | BODY MASS INDEX: 25.77 KG/M2 | SYSTOLIC BLOOD PRESSURE: 110 MMHG | DIASTOLIC BLOOD PRESSURE: 80 MMHG | TEMPERATURE: 97.8 F

## 2019-05-22 DIAGNOSIS — F41.9 ANXIETY: Primary | ICD-10-CM

## 2019-05-22 PROCEDURE — 99213 OFFICE O/P EST LOW 20 MIN: CPT | Performed by: FAMILY MEDICINE

## 2019-05-22 PROCEDURE — 3008F BODY MASS INDEX DOCD: CPT | Performed by: FAMILY MEDICINE

## 2019-05-22 PROCEDURE — 1036F TOBACCO NON-USER: CPT | Performed by: FAMILY MEDICINE

## 2019-05-22 RX ORDER — HYDROXYZINE PAMOATE 25 MG/1
25 CAPSULE ORAL 3 TIMES DAILY PRN
Qty: 90 CAPSULE | Refills: 2 | Status: SHIPPED | OUTPATIENT
Start: 2019-05-22 | End: 2020-01-02

## 2019-05-22 RX ORDER — BUPROPION HYDROCHLORIDE 150 MG/1
150 TABLET ORAL DAILY
Qty: 30 TABLET | Refills: 2 | Status: SHIPPED | OUTPATIENT
Start: 2019-05-22 | End: 2019-08-22 | Stop reason: SDUPTHER

## 2019-06-28 DIAGNOSIS — F41.9 ANXIETY: ICD-10-CM

## 2019-07-02 RX ORDER — ALPRAZOLAM 1 MG/1
TABLET ORAL
Qty: 30 TABLET | Refills: 0 | Status: SHIPPED | OUTPATIENT
Start: 2019-07-02 | End: 2019-07-18 | Stop reason: SDUPTHER

## 2019-07-05 DIAGNOSIS — F41.9 ANXIETY: ICD-10-CM

## 2019-07-05 RX ORDER — PAROXETINE 10 MG/1
TABLET, FILM COATED ORAL
Qty: 30 TABLET | Refills: 0 | Status: SHIPPED | OUTPATIENT
Start: 2019-07-05 | End: 2020-01-02

## 2019-07-18 DIAGNOSIS — F41.9 ANXIETY: ICD-10-CM

## 2019-07-19 RX ORDER — ALPRAZOLAM 1 MG/1
1 TABLET ORAL DAILY
Qty: 30 TABLET | Refills: 0 | Status: SHIPPED | OUTPATIENT
Start: 2019-07-19 | End: 2019-07-30 | Stop reason: SDUPTHER

## 2019-07-30 DIAGNOSIS — F41.9 ANXIETY: ICD-10-CM

## 2019-07-31 RX ORDER — ALPRAZOLAM 1 MG/1
1 TABLET ORAL DAILY
Qty: 30 TABLET | Refills: 0 | Status: SHIPPED | OUTPATIENT
Start: 2019-07-31 | End: 2019-08-22 | Stop reason: SDUPTHER

## 2019-08-22 DIAGNOSIS — F41.9 ANXIETY: ICD-10-CM

## 2019-08-22 RX ORDER — BUPROPION HYDROCHLORIDE 150 MG/1
150 TABLET ORAL DAILY
Qty: 30 TABLET | Refills: 2 | Status: SHIPPED | OUTPATIENT
Start: 2019-08-22 | End: 2019-09-06 | Stop reason: SDUPTHER

## 2019-08-22 RX ORDER — ALPRAZOLAM 1 MG/1
1 TABLET ORAL 3 TIMES DAILY
Qty: 90 TABLET | Refills: 0 | Status: SHIPPED | OUTPATIENT
Start: 2019-08-22 | End: 2019-09-19 | Stop reason: SDUPTHER

## 2019-08-30 ENCOUNTER — TELEPHONE (OUTPATIENT)
Dept: FAMILY MEDICINE CLINIC | Facility: CLINIC | Age: 44
End: 2019-08-30

## 2019-09-05 NOTE — TELEPHONE ENCOUNTER
Patient called and is taking (2) Wellbutrin daily and it seems to be helping patient, I let him know it is only made to be taken once daily per your conversation  What can patient take going forward?

## 2019-09-06 DIAGNOSIS — F41.9 ANXIETY: ICD-10-CM

## 2019-09-06 RX ORDER — BUPROPION HYDROCHLORIDE 300 MG/1
300 TABLET ORAL DAILY
Qty: 30 TABLET | Refills: 1 | Status: SHIPPED | OUTPATIENT
Start: 2019-09-06 | End: 2019-12-22 | Stop reason: SDUPTHER

## 2019-09-13 DIAGNOSIS — I10 BENIGN ESSENTIAL HYPERTENSION: Primary | ICD-10-CM

## 2019-09-13 RX ORDER — OLMESARTAN MEDOXOMIL 20 MG/1
TABLET ORAL
Qty: 60 TABLET | Refills: 1 | Status: SHIPPED | OUTPATIENT
Start: 2019-09-13 | End: 2019-11-19 | Stop reason: SDUPTHER

## 2019-09-19 DIAGNOSIS — F41.9 ANXIETY: ICD-10-CM

## 2019-09-20 RX ORDER — ALPRAZOLAM 1 MG/1
TABLET ORAL
Qty: 90 TABLET | Refills: 0 | Status: SHIPPED | OUTPATIENT
Start: 2019-09-20 | End: 2019-10-23 | Stop reason: SDUPTHER

## 2019-10-23 DIAGNOSIS — F41.9 ANXIETY: ICD-10-CM

## 2019-10-25 RX ORDER — ALPRAZOLAM 1 MG/1
1 TABLET ORAL 3 TIMES DAILY PRN
Qty: 90 TABLET | Refills: 0 | Status: SHIPPED | OUTPATIENT
Start: 2019-10-25 | End: 2019-12-02 | Stop reason: SDUPTHER

## 2019-11-19 DIAGNOSIS — F41.9 ANXIETY: ICD-10-CM

## 2019-11-19 DIAGNOSIS — K29.00 ACUTE SUPERFICIAL GASTRITIS WITHOUT HEMORRHAGE: ICD-10-CM

## 2019-11-19 DIAGNOSIS — I10 BENIGN ESSENTIAL HYPERTENSION: ICD-10-CM

## 2019-11-19 RX ORDER — OLMESARTAN MEDOXOMIL 20 MG/1
TABLET ORAL
Qty: 60 TABLET | Refills: 0 | Status: SHIPPED | OUTPATIENT
Start: 2019-11-19 | End: 2019-12-31 | Stop reason: SDUPTHER

## 2019-11-19 RX ORDER — OMEPRAZOLE 40 MG/1
CAPSULE, DELAYED RELEASE ORAL
Qty: 30 CAPSULE | Refills: 0 | OUTPATIENT
Start: 2019-11-19

## 2019-11-19 RX ORDER — BUPROPION HYDROCHLORIDE 150 MG/1
TABLET ORAL
Qty: 30 TABLET | Refills: 1 | OUTPATIENT
Start: 2019-11-19

## 2019-11-26 DIAGNOSIS — F41.9 ANXIETY: ICD-10-CM

## 2019-11-26 RX ORDER — BUPROPION HYDROCHLORIDE 300 MG/1
300 TABLET ORAL DAILY
Qty: 90 TABLET | Refills: 0 | OUTPATIENT
Start: 2019-11-26

## 2019-11-26 RX ORDER — ALPRAZOLAM 1 MG/1
1 TABLET ORAL 3 TIMES DAILY PRN
Qty: 90 TABLET | Refills: 0 | OUTPATIENT
Start: 2019-11-26

## 2019-11-26 NOTE — TELEPHONE ENCOUNTER
Pt's wife indicated he only has about 2 pills left and then he'll be out  He will call back to schedule an appt but things are hectic for them right now around the holidays        on desk

## 2019-11-29 DIAGNOSIS — F41.9 ANXIETY: ICD-10-CM

## 2019-12-02 DIAGNOSIS — F41.9 ANXIETY: ICD-10-CM

## 2019-12-02 RX ORDER — ALPRAZOLAM 1 MG/1
1 TABLET ORAL 3 TIMES DAILY PRN
Qty: 45 TABLET | Refills: 0 | Status: SHIPPED | OUTPATIENT
Start: 2019-12-02 | End: 2020-01-02

## 2019-12-02 RX ORDER — ALPRAZOLAM 1 MG/1
1 TABLET ORAL 3 TIMES DAILY PRN
Qty: 90 TABLET | Refills: 0 | OUTPATIENT
Start: 2019-12-02

## 2019-12-02 NOTE — TELEPHONE ENCOUNTER
Spoke with wife and I will ask Dr Remy Killian one more time for a refill, she asked for a "few" pills   I will ask for a 2 weeks supply and made it clear to Darren Thurman, his wife it is completely up to the doctor

## 2019-12-20 DIAGNOSIS — I10 BENIGN ESSENTIAL HYPERTENSION: ICD-10-CM

## 2019-12-20 RX ORDER — OLMESARTAN MEDOXOMIL 20 MG/1
TABLET ORAL
Qty: 60 TABLET | Refills: 0 | OUTPATIENT
Start: 2019-12-20

## 2019-12-22 DIAGNOSIS — F41.9 ANXIETY: ICD-10-CM

## 2019-12-23 RX ORDER — BUPROPION HYDROCHLORIDE 300 MG/1
TABLET ORAL
Qty: 30 TABLET | Refills: 0 | Status: SHIPPED | OUTPATIENT
Start: 2019-12-23 | End: 2020-01-02 | Stop reason: SDUPTHER

## 2019-12-31 DIAGNOSIS — I10 BENIGN ESSENTIAL HYPERTENSION: ICD-10-CM

## 2019-12-31 RX ORDER — OLMESARTAN MEDOXOMIL 20 MG/1
20 TABLET ORAL DAILY
Qty: 15 TABLET | Refills: 0 | Status: SHIPPED | OUTPATIENT
Start: 2019-12-31 | End: 2020-01-02 | Stop reason: SDUPTHER

## 2019-12-31 NOTE — TELEPHONE ENCOUNTER
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

## 2020-01-02 ENCOUNTER — OFFICE VISIT (OUTPATIENT)
Dept: FAMILY MEDICINE CLINIC | Facility: CLINIC | Age: 45
End: 2020-01-02
Payer: COMMERCIAL

## 2020-01-02 VITALS
WEIGHT: 193.6 LBS | BODY MASS INDEX: 27.72 KG/M2 | SYSTOLIC BLOOD PRESSURE: 114 MMHG | OXYGEN SATURATION: 97 % | DIASTOLIC BLOOD PRESSURE: 80 MMHG | TEMPERATURE: 96.8 F | HEIGHT: 70 IN | HEART RATE: 78 BPM

## 2020-01-02 DIAGNOSIS — I10 BENIGN ESSENTIAL HYPERTENSION: ICD-10-CM

## 2020-01-02 DIAGNOSIS — K29.00 ACUTE SUPERFICIAL GASTRITIS WITHOUT HEMORRHAGE: ICD-10-CM

## 2020-01-02 DIAGNOSIS — K21.00 GASTROESOPHAGEAL REFLUX DISEASE WITH ESOPHAGITIS: Primary | ICD-10-CM

## 2020-01-02 DIAGNOSIS — F41.9 ANXIETY: ICD-10-CM

## 2020-01-02 PROCEDURE — 3008F BODY MASS INDEX DOCD: CPT | Performed by: FAMILY MEDICINE

## 2020-01-02 PROCEDURE — 3079F DIAST BP 80-89 MM HG: CPT | Performed by: FAMILY MEDICINE

## 2020-01-02 PROCEDURE — 1036F TOBACCO NON-USER: CPT | Performed by: FAMILY MEDICINE

## 2020-01-02 PROCEDURE — 3074F SYST BP LT 130 MM HG: CPT | Performed by: FAMILY MEDICINE

## 2020-01-02 PROCEDURE — 99214 OFFICE O/P EST MOD 30 MIN: CPT | Performed by: FAMILY MEDICINE

## 2020-01-02 RX ORDER — CLONAZEPAM 1 MG/1
1 TABLET ORAL 3 TIMES DAILY
Qty: 90 TABLET | Refills: 5 | Status: SHIPPED | OUTPATIENT
Start: 2020-01-02 | End: 2020-01-02 | Stop reason: SDUPTHER

## 2020-01-02 RX ORDER — OLMESARTAN MEDOXOMIL 20 MG/1
20 TABLET ORAL DAILY
Qty: 90 TABLET | Refills: 1 | Status: SHIPPED | OUTPATIENT
Start: 2020-01-02 | End: 2020-01-27

## 2020-01-02 RX ORDER — OMEPRAZOLE 40 MG/1
40 CAPSULE, DELAYED RELEASE ORAL DAILY
Qty: 90 CAPSULE | Refills: 1 | Status: SHIPPED | OUTPATIENT
Start: 2020-01-02 | End: 2020-07-07 | Stop reason: SDUPTHER

## 2020-01-02 RX ORDER — BUPROPION HYDROCHLORIDE 150 MG/1
150 TABLET ORAL DAILY
Qty: 30 TABLET | Refills: 0 | Status: SHIPPED | OUTPATIENT
Start: 2020-01-02 | End: 2020-02-21

## 2020-01-02 RX ORDER — CLONAZEPAM 1 MG/1
1 TABLET ORAL 3 TIMES DAILY
Qty: 90 TABLET | Refills: 5 | Status: SHIPPED | OUTPATIENT
Start: 2020-01-02 | End: 2020-01-14 | Stop reason: CLARIF

## 2020-01-02 NOTE — LETTER
January 2, 2020     Patient: Davida Mcbride   YOB: 1975   Date of Visit: 1/2/2020       To Whom it May Concern:    Chrystal Cristobal is under my professional care  He was seen in my office on 1/2/2020  He may return to work on 01/02/2020  If you have any questions or concerns, please don't hesitate to call           Sincerely,          Odalys Moreno DO        CC: No Recipients

## 2020-01-02 NOTE — PROGRESS NOTES
Assessment/Plan: patient will continue with current regimen for GERD and hypertension  Refills given at this time  Patient will wean off Wellbutrin as directed as medication not helping  Patient will stop Xanax and use clonazepam as directed  Guidance given overall  Follow-up in 6 months       Diagnoses and all orders for this visit:    Gastroesophageal reflux disease with esophagitis    Benign essential hypertension  -     olmesartan (BENICAR) 20 mg tablet; Take 1 tablet (20 mg total) by mouth daily    Anxiety  -     buPROPion (WELLBUTRIN XL) 150 mg 24 hr tablet; Take 1 tablet (150 mg total) by mouth daily  -     clonazePAM (KlonoPIN) 1 mg tablet; Take 1 tablet (1 mg total) by mouth 3 (three) times a day    Acute superficial gastritis without hemorrhage  -     omeprazole (PriLOSEC) 40 MG capsule; Take 1 capsule (40 mg total) by mouth daily            Subjective:        Patient ID: Arlene Mancini is a 40 y o  male  Patient is here to follow-up on GERD hypertension anxiety  Patient GERD symptoms are stable when he is on medication  Patient is taking medication religiously  Patient is taking Benicar a routine basis also  No chest pain or shortness of breath  Patient does feel cold in the morning  Patient does take Xanax after this and warms up and feels better  Patient has noticed no improvement Wellbutrin  The following portions of the patient's history were reviewed and updated as appropriate: allergies, current medications, past family history, past medical history, past social history, past surgical history and problem list       Review of Systems   Constitutional: Negative  HENT: Negative  Eyes: Negative  Respiratory: Negative  Cardiovascular: Negative  Gastrointestinal: Negative  Endocrine: Positive for cold intolerance  Genitourinary: Negative  Musculoskeletal: Negative  Skin: Negative  Allergic/Immunologic: Negative  Neurological: Negative  Hematological: Negative  Psychiatric/Behavioral: Negative  Objective:      BMI Counseling: Body mass index is 27 78 kg/m²  The BMI is above normal  Nutrition recommendations include decreasing portion sizes  Exercise recommendations include moderate physical activity 150 minutes/week  /80 (BP Location: Right arm, Patient Position: Sitting, Cuff Size: Large)   Pulse 78   Temp (!) 96 8 °F (36 °C) (Tympanic)   Ht 5' 10" (1 778 m)   Wt 87 8 kg (193 lb 9 6 oz)   SpO2 97%   BMI 27 78 kg/m²          Physical Exam   Constitutional: He appears well-developed and well-nourished  No distress  HENT:   Head: Normocephalic  Right Ear: External ear normal    Left Ear: External ear normal    Mouth/Throat: Oropharynx is clear and moist  No oropharyngeal exudate  Eyes: Pupils are equal, round, and reactive to light  EOM are normal  Right eye exhibits no discharge  Left eye exhibits no discharge  No scleral icterus  Neck: Normal range of motion  Neck supple  No thyromegaly present  Cardiovascular: Normal rate, regular rhythm, normal heart sounds and intact distal pulses  Exam reveals no gallop and no friction rub  No murmur heard  Pulmonary/Chest: Effort normal and breath sounds normal  No respiratory distress  He has no wheezes  He has no rales  He exhibits no tenderness  Abdominal: Soft  Bowel sounds are normal  He exhibits no distension  There is no tenderness  There is no rebound and no guarding  Musculoskeletal: Normal range of motion  He exhibits no edema or tenderness  Lymphadenopathy:     He has no cervical adenopathy  Neurological: He is alert  No cranial nerve deficit  He exhibits normal muscle tone  Coordination normal    Skin: Skin is warm and dry  No rash noted  He is not diaphoretic  No erythema  No pallor  Psychiatric: He has a normal mood and affect  His behavior is normal  Judgment and thought content normal    Nursing note and vitals reviewed

## 2020-01-07 ENCOUNTER — TELEPHONE (OUTPATIENT)
Dept: FAMILY MEDICINE CLINIC | Facility: CLINIC | Age: 45
End: 2020-01-07

## 2020-01-07 NOTE — TELEPHONE ENCOUNTER
Patients' wife called in this evening and stated that her  is having more than usual suicidal thought from the medication Clonazepam and wants to get back on Xanax and ween his self off  Ms Lupillo Myrick also stated that he has cold feeling such that his hands are like ice  She wants her medication to be review again and asks is there are other meds in which he can take for his condition  Please advise, thank you

## 2020-01-07 NOTE — TELEPHONE ENCOUNTER
I called and spoke to pt wife I suggested that if he is having suicide feeling that he should be seen at the ER  Pt wife refused that sating that he just needs his meds changed  I offered her an appt tomorrow, she refused that as well  She states that the pt dose not need to be seen just his meds  Changed  I did try to explain that due tot he type of medication, the pt should really be seen again even more so, since having the feedings he is having  The pt wife became frustrated and stated that I should just sent a msg to dr Carlyn Velásquez  Called dr Carlyn Velásquez at home, he feels pt should be seen at ER tonight and f/u tomorrow  Called pt wife and left a msg to call office back  Tried to contact pt and phone number gave a busy tone  Was unable to leave msg  Tried to contact the pt again prior to office closing and this time was able to leave a msg  I asked for a call back  I stated that our office phones are on until 7:45pm, and after that there is a dr  On call if he needed to speak to someone  I did also state that we re open the office tomorrow morning at 8am and will try to reach back out to him then  At 7:42pm the pt returned my phone  I did let them know dr Carlyn Velásquez recommendation to be seen to night at ER  Pt  wife was on the phone as well and stated again that going to er was not necessary  She then stated that pt was not suicide just more depressed   I did state to the pt and his wife that in the beginning, pt wife stated that the pt was feeling suicidal  Pt did make an appt with [facility] on 1/8/2020 @4pm

## 2020-01-09 ENCOUNTER — TELEPHONE (OUTPATIENT)
Dept: FAMILY MEDICINE CLINIC | Facility: CLINIC | Age: 45
End: 2020-01-09

## 2020-01-13 DIAGNOSIS — F41.9 ANXIETY: Primary | ICD-10-CM

## 2020-01-13 NOTE — TELEPHONE ENCOUNTER
Dr Jeancarlos Puckett asked me to reach out to patient and check on him  I left message with my name as a contact for patient to call back if he needed to still be seen 
Dr Sommer Phan asked to have patient called to see how he is doing  Patient no-showed appt yesterday and had just changed from alprazolam to clonazepam  Dr Sommer Phan wants to see if patient is planning on staying on latter or return to former medication  If patient chooses to return to alprazolam, he needs to return clonazepam to office for disposal      I called and left message with both patient and wife Rossy Nick ([phone]) to call office back 
Patient never returned our call 
coagulation(Bleeding disorder R/T clinical cond/anti-coags)

## 2020-01-14 DIAGNOSIS — F41.9 ANXIETY: Primary | ICD-10-CM

## 2020-01-14 RX ORDER — ALPRAZOLAM 0.5 MG/1
1 TABLET ORAL 3 TIMES DAILY PRN
Qty: 90 TABLET | Refills: 2 | OUTPATIENT
Start: 2020-01-14

## 2020-01-14 RX ORDER — ALPRAZOLAM 1 MG/1
1 TABLET ORAL 3 TIMES DAILY PRN
Qty: 90 TABLET | Refills: 2 | Status: SHIPPED | OUTPATIENT
Start: 2020-01-14 | End: 2020-04-13 | Stop reason: SDUPTHER

## 2020-01-14 NOTE — TELEPHONE ENCOUNTER
[facility] the [facility] to them ,to far to drive here after work  She will not give him the Xanax prescribed until then  I will prep med and call patient back to confirm this message

## 2020-01-15 NOTE — TELEPHONE ENCOUNTER
Spoke to pharmacy, Juli Lazo did not show to turn in meds  They confirmed they will not dispense Clonopin until

## 2020-01-17 ENCOUNTER — TELEPHONE (OUTPATIENT)
Dept: OTHER | Facility: OTHER | Age: 45
End: 2020-01-17

## 2020-01-17 NOTE — TELEPHONE ENCOUNTER
Left a detailed message that Krish Welch was calling him today, probably left him a voice mail today around 1:00 that his xanax was ready for  and that Dr Solomon Smith got his message about destroying the Clonopin

## 2020-01-17 NOTE — TELEPHONE ENCOUNTER
Pt stated he was not able to return the clonopin into the pharmacy, the stated it was illegal  He would like to know what to do now because he is out of his meds  He would like to continue xanax

## 2020-01-17 NOTE — TELEPHONE ENCOUNTER
Pt's wife called and left a lengthy, not very nice message on the nurse line regarding his medications  She was explaining how the pharmacy wouldn't dispense the medication even though they paid for the medication  "The patient really needs his medications  He is OUT of his Xanax and needs that  He has tried the other medication and it didn't work " She proceeded to explain "if he doesn't get his medications he will end up in the hospital with withdrawal symptoms  She doesn't understand why this is such an issue - he has been a patient here for several years "  She continued stating "she spoke with a friend/family member of theirs who is a heart surgeon who told them it is illegal to accept scripts back that have already been paid for  He doesn't take more than he is supposed to  The old medication has been discarded within coffee grounds as directed so they no longer have the old medication   If the patient has to go to the hospital, suffers from withdrawal, or something else happens to him, you will be held liable "

## 2020-01-17 NOTE — TELEPHONE ENCOUNTER
OK to allow medicine to be prescribed/given by pharmacist   Please have Xanax dispense by pharmacist as other medication has been thrown out as per the patient

## 2020-01-17 NOTE — TELEPHONE ENCOUNTER
Spoke to Denny Butler the pharmacist she will dispense the xanax  She will call patient herself to explain , she only has 40 in stock today and will dispense the other 50 to patient when her next order comes in

## 2020-01-18 DIAGNOSIS — F41.9 ANXIETY: ICD-10-CM

## 2020-01-20 RX ORDER — BUPROPION HYDROCHLORIDE 300 MG/1
TABLET ORAL
Qty: 30 TABLET | Refills: 0 | OUTPATIENT
Start: 2020-01-20

## 2020-01-27 DIAGNOSIS — I10 BENIGN ESSENTIAL HYPERTENSION: ICD-10-CM

## 2020-01-27 RX ORDER — OLMESARTAN MEDOXOMIL 20 MG/1
TABLET ORAL
Qty: 60 TABLET | Refills: 0 | Status: SHIPPED | OUTPATIENT
Start: 2020-01-27 | End: 2020-02-24

## 2020-02-21 DIAGNOSIS — F41.9 ANXIETY: ICD-10-CM

## 2020-02-21 RX ORDER — BUPROPION HYDROCHLORIDE 150 MG/1
TABLET ORAL
Qty: 90 TABLET | Refills: 0 | Status: SHIPPED | OUTPATIENT
Start: 2020-02-21 | End: 2020-07-07

## 2020-02-22 DIAGNOSIS — I10 BENIGN ESSENTIAL HYPERTENSION: ICD-10-CM

## 2020-02-24 RX ORDER — OLMESARTAN MEDOXOMIL 20 MG/1
TABLET ORAL
Qty: 60 TABLET | Refills: 0 | Status: SHIPPED | OUTPATIENT
Start: 2020-02-24 | End: 2020-04-06

## 2020-04-06 DIAGNOSIS — I10 BENIGN ESSENTIAL HYPERTENSION: ICD-10-CM

## 2020-04-06 RX ORDER — OLMESARTAN MEDOXOMIL 20 MG/1
TABLET ORAL
Qty: 60 TABLET | Refills: 2 | Status: SHIPPED | OUTPATIENT
Start: 2020-04-06 | End: 2020-07-07 | Stop reason: SDUPTHER

## 2020-04-13 DIAGNOSIS — F41.9 ANXIETY: ICD-10-CM

## 2020-04-13 RX ORDER — ALPRAZOLAM 1 MG/1
1 TABLET ORAL 3 TIMES DAILY PRN
Qty: 90 TABLET | Refills: 2 | Status: SHIPPED | OUTPATIENT
Start: 2020-04-13 | End: 2020-07-07 | Stop reason: SDUPTHER

## 2020-04-13 RX ORDER — ALPRAZOLAM 1 MG/1
TABLET ORAL
Qty: 90 TABLET | Refills: 0 | OUTPATIENT
Start: 2020-04-13

## 2020-07-07 ENCOUNTER — OFFICE VISIT (OUTPATIENT)
Dept: FAMILY MEDICINE CLINIC | Facility: CLINIC | Age: 45
End: 2020-07-07

## 2020-07-07 VITALS
HEIGHT: 70 IN | BODY MASS INDEX: 29.2 KG/M2 | TEMPERATURE: 97.9 F | DIASTOLIC BLOOD PRESSURE: 70 MMHG | SYSTOLIC BLOOD PRESSURE: 112 MMHG | WEIGHT: 204 LBS

## 2020-07-07 DIAGNOSIS — I10 BENIGN ESSENTIAL HYPERTENSION: ICD-10-CM

## 2020-07-07 DIAGNOSIS — F41.9 ANXIETY: ICD-10-CM

## 2020-07-07 DIAGNOSIS — K21.00 GASTROESOPHAGEAL REFLUX DISEASE WITH ESOPHAGITIS: Primary | ICD-10-CM

## 2020-07-07 DIAGNOSIS — K29.00 ACUTE SUPERFICIAL GASTRITIS WITHOUT HEMORRHAGE: ICD-10-CM

## 2020-07-07 PROCEDURE — 3078F DIAST BP <80 MM HG: CPT | Performed by: FAMILY MEDICINE

## 2020-07-07 PROCEDURE — 1036F TOBACCO NON-USER: CPT | Performed by: FAMILY MEDICINE

## 2020-07-07 PROCEDURE — 3008F BODY MASS INDEX DOCD: CPT | Performed by: FAMILY MEDICINE

## 2020-07-07 PROCEDURE — 3074F SYST BP LT 130 MM HG: CPT | Performed by: FAMILY MEDICINE

## 2020-07-07 PROCEDURE — 99214 OFFICE O/P EST MOD 30 MIN: CPT | Performed by: FAMILY MEDICINE

## 2020-07-07 RX ORDER — OLMESARTAN MEDOXOMIL 20 MG/1
40 TABLET ORAL DAILY
Qty: 60 TABLET | Refills: 5 | Status: SHIPPED | OUTPATIENT
Start: 2020-07-07 | End: 2020-12-18 | Stop reason: SDUPTHER

## 2020-07-07 RX ORDER — OMEPRAZOLE 40 MG/1
40 CAPSULE, DELAYED RELEASE ORAL DAILY
Qty: 30 CAPSULE | Refills: 5 | Status: SHIPPED | OUTPATIENT
Start: 2020-07-07 | End: 2020-12-18 | Stop reason: SDUPTHER

## 2020-07-07 RX ORDER — ALPRAZOLAM 1 MG/1
1 TABLET ORAL 3 TIMES DAILY PRN
Qty: 90 TABLET | Refills: 5 | Status: SHIPPED | OUTPATIENT
Start: 2020-07-07 | End: 2020-12-18 | Stop reason: SDUPTHER

## 2020-07-07 NOTE — PROGRESS NOTES
Assessment/Plan:  Chronic conditions stable overall  Refills given at this time  Guidance given  Patient will follow-up in 6 months or as needed  Diagnoses and all orders for this visit:    Gastroesophageal reflux disease with esophagitis    Anxiety  -     ALPRAZolam (XANAX) 1 mg tablet; Take 1 tablet (1 mg total) by mouth 3 (three) times a day as needed for anxiety    Benign essential hypertension  -     olmesartan (BENICAR) 20 mg tablet; Take 2 tablets (40 mg total) by mouth daily    Acute superficial gastritis without hemorrhage  -     omeprazole (PriLOSEC) 40 MG capsule; Take 1 capsule (40 mg total) by mouth daily            Subjective:        Patient ID: Anna Irvin is a 39 y o  male  Patient follow-up on gastritis, hypertension  Patient also here to follow-up on anxiety  Patient doing well from an anxiety standpoint  This is stable  Patient is use Xanax 2-3 times daily  No new chest pain shortness of breath problems urinating or defecating  Gastric reflux relatively stable overall  Patient does use medication daily but occasionally twice daily  All other review systems negative  The following portions of the patient's history were reviewed and updated as appropriate: allergies, current medications, past family history, past medical history, past social history, past surgical history and problem list       Review of Systems   Constitutional: Negative  HENT: Negative  Eyes: Negative  Respiratory: Negative  Cardiovascular: Negative  Gastrointestinal: Negative  Endocrine: Negative  Genitourinary: Negative  Musculoskeletal: Negative  Skin: Negative  Allergic/Immunologic: Negative  Neurological: Negative  Hematological: Negative  Psychiatric/Behavioral: Negative              Objective:               /70 (BP Location: Right arm, Patient Position: Sitting, Cuff Size: Adult)   Temp 97 9 °F (36 6 °C) (Tympanic)   Ht 5' 10" (1 778 m)   Wt 92 5 kg (204 lb)   BMI 29 27 kg/m²          Physical Exam   Constitutional: He appears well-developed and well-nourished  No distress  HENT:   Head: Normocephalic  Right Ear: External ear normal    Left Ear: External ear normal    Eyes: Pupils are equal, round, and reactive to light  EOM are normal  Right eye exhibits no discharge  Left eye exhibits no discharge  No scleral icterus  Neck: Normal range of motion  Neck supple  No thyromegaly present  Cardiovascular: Normal rate, regular rhythm, normal heart sounds and intact distal pulses  Exam reveals no gallop and no friction rub  No murmur heard  Pulmonary/Chest: Effort normal and breath sounds normal  No respiratory distress  He has no wheezes  He has no rales  He exhibits no tenderness  Abdominal: Soft  Bowel sounds are normal  He exhibits no distension  There is no tenderness  There is no rebound and no guarding  Musculoskeletal: Normal range of motion  He exhibits no edema or tenderness  Lymphadenopathy:     He has no cervical adenopathy  Neurological: He is alert  No cranial nerve deficit  He exhibits normal muscle tone  Coordination normal    Skin: Skin is warm and dry  No rash noted  He is not diaphoretic  No erythema  No pallor  Psychiatric: He has a normal mood and affect  His behavior is normal  Judgment and thought content normal    Nursing note and vitals reviewed

## 2020-11-13 ENCOUNTER — OCCMED (OUTPATIENT)
Dept: URGENT CARE | Age: 45
End: 2020-11-13

## 2020-11-13 DIAGNOSIS — Z11.59 SPECIAL SCREENING EXAMINATION FOR VIRAL DISEASE: Primary | ICD-10-CM

## 2020-11-13 PROCEDURE — U0003 INFECTIOUS AGENT DETECTION BY NUCLEIC ACID (DNA OR RNA); SEVERE ACUTE RESPIRATORY SYNDROME CORONAVIRUS 2 (SARS-COV-2) (CORONAVIRUS DISEASE [COVID-19]), AMPLIFIED PROBE TECHNIQUE, MAKING USE OF HIGH THROUGHPUT TECHNOLOGIES AS DESCRIBED BY CMS-2020-01-R: HCPCS

## 2020-11-15 LAB — SARS-COV-2 RNA SPEC QL NAA+PROBE: NOT DETECTED

## 2020-11-17 ENCOUNTER — TELEPHONE (OUTPATIENT)
Dept: OTOLARYNGOLOGY | Facility: CLINIC | Age: 45
End: 2020-11-17

## 2020-11-20 ENCOUNTER — TELEPHONE (OUTPATIENT)
Dept: OTOLARYNGOLOGY | Facility: CLINIC | Age: 45
End: 2020-11-20

## 2020-12-18 DIAGNOSIS — F41.9 ANXIETY: ICD-10-CM

## 2020-12-18 DIAGNOSIS — I10 BENIGN ESSENTIAL HYPERTENSION: ICD-10-CM

## 2020-12-18 DIAGNOSIS — K29.00 ACUTE SUPERFICIAL GASTRITIS WITHOUT HEMORRHAGE: ICD-10-CM

## 2020-12-18 RX ORDER — ALPRAZOLAM 1 MG/1
1 TABLET ORAL 3 TIMES DAILY PRN
Qty: 90 TABLET | Refills: 0 | Status: SHIPPED | OUTPATIENT
Start: 2020-12-18 | End: 2021-01-05 | Stop reason: SDUPTHER

## 2020-12-18 RX ORDER — OMEPRAZOLE 40 MG/1
40 CAPSULE, DELAYED RELEASE ORAL DAILY
Qty: 30 CAPSULE | Refills: 0 | Status: SHIPPED | OUTPATIENT
Start: 2020-12-18 | End: 2021-05-09

## 2020-12-18 RX ORDER — OLMESARTAN MEDOXOMIL 20 MG/1
40 TABLET ORAL DAILY
Qty: 60 TABLET | Refills: 0 | Status: SHIPPED | OUTPATIENT
Start: 2020-12-18 | End: 2021-05-10 | Stop reason: SDUPTHER

## 2021-01-05 ENCOUNTER — OFFICE VISIT (OUTPATIENT)
Dept: FAMILY MEDICINE CLINIC | Facility: CLINIC | Age: 46
End: 2021-01-05
Payer: COMMERCIAL

## 2021-01-05 VITALS
OXYGEN SATURATION: 96 % | WEIGHT: 226 LBS | DIASTOLIC BLOOD PRESSURE: 70 MMHG | HEART RATE: 65 BPM | TEMPERATURE: 97.7 F | BODY MASS INDEX: 32.35 KG/M2 | RESPIRATION RATE: 18 BRPM | SYSTOLIC BLOOD PRESSURE: 124 MMHG | HEIGHT: 70 IN

## 2021-01-05 DIAGNOSIS — I10 BENIGN ESSENTIAL HYPERTENSION: Primary | ICD-10-CM

## 2021-01-05 DIAGNOSIS — L23.9 ALLERGIC CONTACT DERMATITIS, UNSPECIFIED TRIGGER: ICD-10-CM

## 2021-01-05 DIAGNOSIS — F41.9 ANXIETY: ICD-10-CM

## 2021-01-05 PROCEDURE — 1036F TOBACCO NON-USER: CPT | Performed by: FAMILY MEDICINE

## 2021-01-05 PROCEDURE — 3008F BODY MASS INDEX DOCD: CPT | Performed by: FAMILY MEDICINE

## 2021-01-05 PROCEDURE — 99214 OFFICE O/P EST MOD 30 MIN: CPT | Performed by: FAMILY MEDICINE

## 2021-01-05 PROCEDURE — 3074F SYST BP LT 130 MM HG: CPT | Performed by: FAMILY MEDICINE

## 2021-01-05 PROCEDURE — 3078F DIAST BP <80 MM HG: CPT | Performed by: FAMILY MEDICINE

## 2021-01-05 RX ORDER — ALPRAZOLAM 1 MG/1
1 TABLET ORAL 3 TIMES DAILY PRN
Qty: 90 TABLET | Refills: 0 | Status: SHIPPED | OUTPATIENT
Start: 2021-01-05 | End: 2021-02-02

## 2021-01-05 RX ORDER — PREDNISONE 10 MG/1
TABLET ORAL
Qty: 45 TABLET | Refills: 0 | Status: SHIPPED | OUTPATIENT
Start: 2021-01-05 | End: 2022-02-01

## 2021-01-05 NOTE — PROGRESS NOTES
Assessment/Plan:  Patient will continue with current regimen for hypertension as well as anxiety  Refills given at this time  Patient will have other refills given when needed  Patient will use prednisone as directed for dermatitis  Patient follow-up in 6 months or as needed       Diagnoses and all orders for this visit:    Benign essential hypertension    Anxiety  -     ALPRAZolam (XANAX) 1 mg tablet; Take 1 tablet (1 mg total) by mouth 3 (three) times a day as needed for anxiety    Allergic contact dermatitis, unspecified trigger            Subjective:        Patient ID: Bree Leo is a 39 y o  male  Patient follow-up on anxiety and hypertension  Patient also with rash on lower extremities which began yesterday  Patient does notice pruritus  No new soaps detergents or fabric softeners  Patient did get a new batch of omeprazole roughly 1 week ago  No new foods  No fever noted  Patient did use Benadryl last night  The following portions of the patient's history were reviewed and updated as appropriate: allergies, current medications, past family history, past medical history, past social history, past surgical history and problem list       Review of Systems   Constitutional: Negative  HENT: Negative  Eyes: Negative  Respiratory: Negative  Cardiovascular: Negative  Gastrointestinal: Negative  Endocrine: Negative  Genitourinary: Negative  Musculoskeletal: Negative  Skin: Positive for rash  Allergic/Immunologic: Negative  Neurological: Negative  Hematological: Negative  Psychiatric/Behavioral: Negative  Objective:      BMI Counseling: Body mass index is 32 43 kg/m²  The BMI is above normal  Nutrition recommendations include decreasing portion sizes  Exercise recommendations include moderate physical activity 150 minutes/week                 /70   Pulse 65   Temp 97 7 °F (36 5 °C) (Tympanic)   Resp 18   Ht 5' 10" (1 778 m)   Wt 103 kg (226 lb)   SpO2 96%   BMI 32 43 kg/m²          Physical Exam  Vitals signs and nursing note reviewed  Constitutional:       General: He is not in acute distress  Appearance: Normal appearance  He is not ill-appearing, toxic-appearing or diaphoretic  HENT:      Head: Normocephalic and atraumatic  Right Ear: Tympanic membrane, ear canal and external ear normal  There is no impacted cerumen  Left Ear: Tympanic membrane, ear canal and external ear normal  There is no impacted cerumen  Nose: Nose normal  No congestion or rhinorrhea  Mouth/Throat:      Mouth: Mucous membranes are moist       Pharynx: No oropharyngeal exudate or posterior oropharyngeal erythema  Eyes:      General: No scleral icterus  Right eye: No discharge  Left eye: No discharge  Extraocular Movements: Extraocular movements intact  Conjunctiva/sclera: Conjunctivae normal       Pupils: Pupils are equal, round, and reactive to light  Neck:      Musculoskeletal: Normal range of motion and neck supple  No neck rigidity or muscular tenderness  Vascular: No carotid bruit  Cardiovascular:      Rate and Rhythm: Normal rate and regular rhythm  Pulses: Normal pulses  Heart sounds: Normal heart sounds  No murmur  No friction rub  No gallop  Pulmonary:      Effort: Pulmonary effort is normal  No respiratory distress  Breath sounds: Normal breath sounds  No stridor  No wheezing, rhonchi or rales  Chest:      Chest wall: No tenderness  Abdominal:      General: Abdomen is flat  Bowel sounds are normal       Palpations: Abdomen is soft  Tenderness: There is no right CVA tenderness or left CVA tenderness  Musculoskeletal: Normal range of motion  General: No swelling, tenderness, deformity or signs of injury  Right lower leg: No edema  Left lower leg: No edema  Lymphadenopathy:      Cervical: No cervical adenopathy  Skin:     General: Skin is warm and dry  Capillary Refill: Capillary refill takes less than 2 seconds  Coloration: Skin is not jaundiced  Findings: Rash present  No bruising, erythema or lesion  Neurological:      General: No focal deficit present  Mental Status: He is alert and oriented to person, place, and time  Cranial Nerves: No cranial nerve deficit  Sensory: No sensory deficit  Motor: No weakness  Coordination: Coordination normal       Gait: Gait normal    Psychiatric:         Mood and Affect: Mood normal          Behavior: Behavior normal          Thought Content:  Thought content normal          Judgment: Judgment normal

## 2021-02-02 DIAGNOSIS — F41.9 ANXIETY: ICD-10-CM

## 2021-02-02 RX ORDER — ALPRAZOLAM 1 MG/1
TABLET ORAL
Qty: 90 TABLET | Refills: 4 | Status: SHIPPED | OUTPATIENT
Start: 2021-02-02 | End: 2021-06-23

## 2021-05-08 DIAGNOSIS — K29.00 ACUTE SUPERFICIAL GASTRITIS WITHOUT HEMORRHAGE: ICD-10-CM

## 2021-05-09 RX ORDER — OMEPRAZOLE 40 MG/1
CAPSULE, DELAYED RELEASE ORAL
Qty: 30 CAPSULE | Refills: 0 | Status: SHIPPED | OUTPATIENT
Start: 2021-05-09 | End: 2021-07-22 | Stop reason: SDUPTHER

## 2021-05-10 DIAGNOSIS — I10 BENIGN ESSENTIAL HYPERTENSION: ICD-10-CM

## 2021-05-10 RX ORDER — OLMESARTAN MEDOXOMIL 20 MG/1
40 TABLET ORAL DAILY
Qty: 60 TABLET | Refills: 0 | Status: SHIPPED | OUTPATIENT
Start: 2021-05-10 | End: 2021-06-30 | Stop reason: SDUPTHER

## 2021-06-30 DIAGNOSIS — I10 BENIGN ESSENTIAL HYPERTENSION: ICD-10-CM

## 2021-07-01 RX ORDER — OLMESARTAN MEDOXOMIL 20 MG/1
40 TABLET ORAL DAILY
Qty: 60 TABLET | Refills: 0 | Status: SHIPPED | OUTPATIENT
Start: 2021-07-01 | End: 2021-07-22 | Stop reason: SDUPTHER

## 2021-07-19 DIAGNOSIS — K29.00 ACUTE SUPERFICIAL GASTRITIS WITHOUT HEMORRHAGE: ICD-10-CM

## 2021-07-19 DIAGNOSIS — F41.9 ANXIETY: ICD-10-CM

## 2021-07-19 RX ORDER — ALPRAZOLAM 1 MG/1
TABLET ORAL
Qty: 90 TABLET | Refills: 0 | Status: CANCELLED | OUTPATIENT
Start: 2021-07-19

## 2021-07-19 RX ORDER — OMEPRAZOLE 40 MG/1
40 CAPSULE, DELAYED RELEASE ORAL DAILY
Qty: 30 CAPSULE | Refills: 0 | Status: CANCELLED | OUTPATIENT
Start: 2021-07-19

## 2021-07-20 DIAGNOSIS — K29.00 ACUTE SUPERFICIAL GASTRITIS WITHOUT HEMORRHAGE: ICD-10-CM

## 2021-07-20 DIAGNOSIS — F41.9 ANXIETY: ICD-10-CM

## 2021-07-20 RX ORDER — ALPRAZOLAM 1 MG/1
TABLET ORAL
Qty: 90 TABLET | Refills: 0 | OUTPATIENT
Start: 2021-07-20

## 2021-07-20 RX ORDER — OMEPRAZOLE 40 MG/1
40 CAPSULE, DELAYED RELEASE ORAL DAILY
Qty: 30 CAPSULE | Refills: 0 | OUTPATIENT
Start: 2021-07-20

## 2021-07-22 ENCOUNTER — OFFICE VISIT (OUTPATIENT)
Dept: FAMILY MEDICINE CLINIC | Facility: CLINIC | Age: 46
End: 2021-07-22
Payer: COMMERCIAL

## 2021-07-22 VITALS
HEIGHT: 70 IN | WEIGHT: 203.6 LBS | DIASTOLIC BLOOD PRESSURE: 98 MMHG | TEMPERATURE: 96.1 F | BODY MASS INDEX: 29.15 KG/M2 | SYSTOLIC BLOOD PRESSURE: 138 MMHG

## 2021-07-22 DIAGNOSIS — K58.0 IRRITABLE BOWEL SYNDROME WITH DIARRHEA: ICD-10-CM

## 2021-07-22 DIAGNOSIS — K21.00 GASTROESOPHAGEAL REFLUX DISEASE WITH ESOPHAGITIS WITHOUT HEMORRHAGE: Primary | ICD-10-CM

## 2021-07-22 DIAGNOSIS — I10 BENIGN ESSENTIAL HYPERTENSION: ICD-10-CM

## 2021-07-22 DIAGNOSIS — K29.00 ACUTE SUPERFICIAL GASTRITIS WITHOUT HEMORRHAGE: ICD-10-CM

## 2021-07-22 DIAGNOSIS — F41.9 ANXIETY: ICD-10-CM

## 2021-07-22 PROCEDURE — 99214 OFFICE O/P EST MOD 30 MIN: CPT | Performed by: FAMILY MEDICINE

## 2021-07-22 PROCEDURE — 3725F SCREEN DEPRESSION PERFORMED: CPT | Performed by: FAMILY MEDICINE

## 2021-07-22 PROCEDURE — 3080F DIAST BP >= 90 MM HG: CPT | Performed by: FAMILY MEDICINE

## 2021-07-22 PROCEDURE — 1036F TOBACCO NON-USER: CPT | Performed by: FAMILY MEDICINE

## 2021-07-22 PROCEDURE — 3008F BODY MASS INDEX DOCD: CPT | Performed by: FAMILY MEDICINE

## 2021-07-22 PROCEDURE — 3075F SYST BP GE 130 - 139MM HG: CPT | Performed by: FAMILY MEDICINE

## 2021-07-22 RX ORDER — OMEPRAZOLE 40 MG/1
40 CAPSULE, DELAYED RELEASE ORAL DAILY
Qty: 90 CAPSULE | Refills: 1 | Status: SHIPPED | OUTPATIENT
Start: 2021-07-22 | End: 2022-02-01 | Stop reason: SDUPTHER

## 2021-07-22 RX ORDER — OLMESARTAN MEDOXOMIL 20 MG/1
40 TABLET ORAL DAILY
Qty: 180 TABLET | Refills: 1 | Status: SHIPPED | OUTPATIENT
Start: 2021-07-22 | End: 2022-02-01 | Stop reason: SDUPTHER

## 2021-07-22 RX ORDER — ALPRAZOLAM 1 MG/1
1 TABLET ORAL 3 TIMES DAILY PRN
Qty: 90 TABLET | Refills: 5 | Status: SHIPPED | OUTPATIENT
Start: 2021-07-22 | End: 2022-01-04 | Stop reason: SDUPTHER

## 2021-07-23 DIAGNOSIS — N52.01 ERECTILE DYSFUNCTION DUE TO ARTERIAL INSUFFICIENCY: Primary | ICD-10-CM

## 2021-07-23 RX ORDER — SILDENAFIL 100 MG/1
100 TABLET, FILM COATED ORAL DAILY PRN
COMMUNITY
End: 2021-07-23 | Stop reason: SDUPTHER

## 2021-07-23 RX ORDER — SILDENAFIL 100 MG/1
100 TABLET, FILM COATED ORAL DAILY PRN
Qty: 10 TABLET | Refills: 5 | Status: SHIPPED | OUTPATIENT
Start: 2021-07-23 | End: 2022-02-01

## 2021-08-09 DIAGNOSIS — R11.0 NAUSEA: Primary | ICD-10-CM

## 2021-08-09 RX ORDER — ONDANSETRON 4 MG/1
4 TABLET, ORALLY DISINTEGRATING ORAL EVERY 6 HOURS PRN
COMMUNITY
End: 2021-08-09 | Stop reason: SDUPTHER

## 2021-08-10 RX ORDER — ONDANSETRON 4 MG/1
4 TABLET, ORALLY DISINTEGRATING ORAL EVERY 6 HOURS PRN
Qty: 20 TABLET | Refills: 0 | Status: SHIPPED | OUTPATIENT
Start: 2021-08-10 | End: 2022-02-01

## 2022-01-04 DIAGNOSIS — F41.9 ANXIETY: ICD-10-CM

## 2022-01-04 RX ORDER — ALPRAZOLAM 1 MG/1
1 TABLET ORAL 3 TIMES DAILY PRN
Qty: 90 TABLET | Refills: 0 | Status: SHIPPED | OUTPATIENT
Start: 2022-01-04 | End: 2022-02-01 | Stop reason: SDUPTHER

## 2022-02-01 ENCOUNTER — OFFICE VISIT (OUTPATIENT)
Dept: FAMILY MEDICINE CLINIC | Facility: CLINIC | Age: 47
End: 2022-02-01
Payer: COMMERCIAL

## 2022-02-01 VITALS
OXYGEN SATURATION: 99 % | TEMPERATURE: 97.9 F | HEIGHT: 70 IN | HEART RATE: 77 BPM | BODY MASS INDEX: 32.21 KG/M2 | WEIGHT: 225 LBS | DIASTOLIC BLOOD PRESSURE: 80 MMHG | SYSTOLIC BLOOD PRESSURE: 110 MMHG

## 2022-02-01 DIAGNOSIS — K29.00 ACUTE SUPERFICIAL GASTRITIS WITHOUT HEMORRHAGE: ICD-10-CM

## 2022-02-01 DIAGNOSIS — F41.9 ANXIETY: ICD-10-CM

## 2022-02-01 DIAGNOSIS — I10 BENIGN ESSENTIAL HYPERTENSION: Primary | ICD-10-CM

## 2022-02-01 DIAGNOSIS — K21.00 GASTROESOPHAGEAL REFLUX DISEASE WITH ESOPHAGITIS WITHOUT HEMORRHAGE: ICD-10-CM

## 2022-02-01 PROCEDURE — 1036F TOBACCO NON-USER: CPT | Performed by: FAMILY MEDICINE

## 2022-02-01 PROCEDURE — 3079F DIAST BP 80-89 MM HG: CPT | Performed by: FAMILY MEDICINE

## 2022-02-01 PROCEDURE — 3008F BODY MASS INDEX DOCD: CPT | Performed by: FAMILY MEDICINE

## 2022-02-01 PROCEDURE — 99214 OFFICE O/P EST MOD 30 MIN: CPT | Performed by: FAMILY MEDICINE

## 2022-02-01 PROCEDURE — 3074F SYST BP LT 130 MM HG: CPT | Performed by: FAMILY MEDICINE

## 2022-02-01 RX ORDER — ALPRAZOLAM 1 MG/1
1 TABLET ORAL 4 TIMES DAILY PRN
Qty: 120 TABLET | Refills: 5 | Status: SHIPPED | OUTPATIENT
Start: 2022-02-01 | End: 2022-07-21

## 2022-02-01 RX ORDER — OLMESARTAN MEDOXOMIL 20 MG/1
40 TABLET ORAL DAILY
Qty: 180 TABLET | Refills: 1 | Status: SHIPPED | OUTPATIENT
Start: 2022-02-01

## 2022-02-01 RX ORDER — OMEPRAZOLE 40 MG/1
40 CAPSULE, DELAYED RELEASE ORAL DAILY
Qty: 90 CAPSULE | Refills: 1 | Status: SHIPPED | OUTPATIENT
Start: 2022-02-01

## 2022-02-01 NOTE — PROGRESS NOTES
Assessment/Plan:  Last glucose  Glucose 99 GFR greater than 60 LDL 1 0 side Blood pressure and GERD stable this time  Continue with current regimen  Benicar and omeprazole written for and refills given at this time  Patient will have Xanax increased to 4 times daily as needed  Refills given at this time  Patient will be referred to psychiatry for anxiety  Patient will go for laboratory studies as noted below  Follow-up in 6 month       Diagnoses and all orders for this visit:    Benign essential hypertension  -     olmesartan (BENICAR) 20 mg tablet; Take 2 tablets (40 mg total) by mouth daily  -     CBC and differential; Future  -     Comprehensive metabolic panel; Future  -     Lipid panel; Future  -     TSH, 3rd generation with Free T4 reflex; Future    Anxiety  -     ALPRAZolam (XANAX) 1 mg tablet; Take 1 tablet (1 mg total) by mouth 4 (four) times a day as needed for anxiety  -     Ambulatory Referral to Psychiatry; Future  -     CBC and differential; Future  -     Comprehensive metabolic panel; Future  -     Lipid panel; Future  -     TSH, 3rd generation with Free T4 reflex; Future    Acute superficial gastritis without hemorrhage  -     omeprazole (PriLOSEC) 40 MG capsule; Take 1 capsule (40 mg total) by mouth daily  -     CBC and differential; Future  -     Comprehensive metabolic panel; Future  -     Lipid panel; Future  -     TSH, 3rd generation with Free T4 reflex; Future    Gastroesophageal reflux disease with esophagitis without hemorrhage  -     CBC and differential; Future  -     Comprehensive metabolic panel; Future  -     Lipid panel; Future  -     TSH, 3rd generation with Free T4 reflex; Future            Subjective:        Patient ID: Emmett Lafleur is a 52 y o  male  Patient follow-up on gastritis as well as anxiety and hypertension  The patient with increased anxiety  Patient using up to 4 Xanax per day    Patient without any chest pain or shortness of breath or abdominal pain or problems urinating or defecating  No headache or visual disturbance  No reflux symptoms at this time  Patient will need refills for chronic conditions at this time  The following portions of the patient's history were reviewed and updated as appropriate: allergies, current medications, past family history, past medical history, past social history, past surgical history and problem list       Review of Systems   Constitutional: Negative  HENT: Negative  Eyes: Negative  Respiratory: Negative  Cardiovascular: Negative  Gastrointestinal: Negative  Endocrine: Negative  Genitourinary: Negative  Musculoskeletal: Negative  Skin: Negative  Allergic/Immunologic: Negative  Neurological: Negative  Hematological: Negative  Psychiatric/Behavioral: The patient is nervous/anxious  Objective:      BMI Counseling: Body mass index is 32 28 kg/m²  The BMI is above normal  Nutrition recommendations include decreasing portion sizes  Exercise recommendations include moderate physical activity 150 minutes/week  Rationale for BMI follow-up plan is due to patient being overweight or obese  Depression Screening and Follow-up Plan: Clincally patient does not have depression  No treatment is required  /80   Pulse 77   Temp 97 9 °F (36 6 °C)   Ht 5' 10" (1 778 m)   Wt 102 kg (225 lb)   SpO2 99%   BMI 32 28 kg/m²          Physical Exam  Vitals and nursing note reviewed  Constitutional:       General: He is not in acute distress  Appearance: Normal appearance  He is not ill-appearing, toxic-appearing or diaphoretic  HENT:      Head: Normocephalic and atraumatic  Right Ear: Tympanic membrane, ear canal and external ear normal  There is no impacted cerumen  Left Ear: Tympanic membrane, ear canal and external ear normal  There is no impacted cerumen  Nose: Nose normal  No congestion or rhinorrhea  Eyes:      General: No scleral icterus  Right eye: No discharge  Left eye: No discharge  Extraocular Movements: Extraocular movements intact  Conjunctiva/sclera: Conjunctivae normal       Pupils: Pupils are equal, round, and reactive to light  Neck:      Vascular: No carotid bruit  Cardiovascular:      Rate and Rhythm: Normal rate and regular rhythm  Pulses: Normal pulses  Heart sounds: Normal heart sounds  No murmur heard  No friction rub  No gallop  Pulmonary:      Effort: Pulmonary effort is normal  No respiratory distress  Breath sounds: Normal breath sounds  No stridor  No wheezing, rhonchi or rales  Chest:      Chest wall: No tenderness  Musculoskeletal:         General: No swelling, tenderness, deformity or signs of injury  Normal range of motion  Cervical back: Normal range of motion and neck supple  No rigidity  No muscular tenderness  Right lower leg: No edema  Left lower leg: No edema  Lymphadenopathy:      Cervical: No cervical adenopathy  Skin:     General: Skin is warm and dry  Capillary Refill: Capillary refill takes less than 2 seconds  Coloration: Skin is not jaundiced  Findings: No bruising, erythema, lesion or rash  Neurological:      Mental Status: He is alert and oriented to person, place, and time  Mental status is at baseline  Cranial Nerves: No cranial nerve deficit  Sensory: No sensory deficit  Motor: No weakness  Coordination: Coordination normal       Gait: Gait normal    Psychiatric:         Mood and Affect: Mood normal          Behavior: Behavior normal          Thought Content:  Thought content normal          Judgment: Judgment normal

## 2022-08-04 NOTE — TELEPHONE ENCOUNTER
LMOM for a call back 
Left 2nd  today for patient to return call to let us know if he is still taking medication, as per Dr Ilene Oreilly, patient should be off of the med 
Patient should be off medication at this time
Spoke to Dr Jimmy Bustillos, left brief message for patient and pharmacy that patient is to be weaning off this medication and he is denying the auto fill at this time 
lmom for pt to call back to  See if off this med as per dr Tahira Urena
Home

## 2022-08-13 ENCOUNTER — TELEPHONE (OUTPATIENT)
Dept: OTHER | Facility: OTHER | Age: 47
End: 2022-08-13

## 2022-08-15 ENCOUNTER — OFFICE VISIT (OUTPATIENT)
Dept: FAMILY MEDICINE CLINIC | Facility: CLINIC | Age: 47
End: 2022-08-15
Payer: COMMERCIAL

## 2022-08-15 VITALS
DIASTOLIC BLOOD PRESSURE: 88 MMHG | HEIGHT: 70 IN | WEIGHT: 217.4 LBS | TEMPERATURE: 98.7 F | SYSTOLIC BLOOD PRESSURE: 142 MMHG | BODY MASS INDEX: 31.12 KG/M2

## 2022-08-15 DIAGNOSIS — U07.1 COVID-19: ICD-10-CM

## 2022-08-15 DIAGNOSIS — J02.9 SORE THROAT: Primary | ICD-10-CM

## 2022-08-15 DIAGNOSIS — F41.9 ANXIETY: ICD-10-CM

## 2022-08-15 PROCEDURE — 99213 OFFICE O/P EST LOW 20 MIN: CPT | Performed by: FAMILY MEDICINE

## 2022-08-15 PROCEDURE — 87147 CULTURE TYPE IMMUNOLOGIC: CPT | Performed by: FAMILY MEDICINE

## 2022-08-15 PROCEDURE — 3725F SCREEN DEPRESSION PERFORMED: CPT | Performed by: FAMILY MEDICINE

## 2022-08-15 PROCEDURE — 87070 CULTURE OTHR SPECIMN AEROBIC: CPT | Performed by: FAMILY MEDICINE

## 2022-08-15 RX ORDER — ALPRAZOLAM 1 MG/1
TABLET ORAL
Qty: 120 TABLET | Refills: 0 | Status: CANCELLED | OUTPATIENT
Start: 2022-08-15

## 2022-08-15 NOTE — PROGRESS NOTES
Assessment/Plan:throat cx done  Supportive care rec,  Note for patient to remain out of work for the next week  Diagnoses and all orders for this visit:    COVID-19            Subjective:        Patient ID: Melvin James is a 52 y o  male  Patient is here with cough and sore throat  Patient tested positive for COVID on the 8th of August   Patient still with fatigue  Fever broke overnight  No vomiting  Patient with diarrhea  No medications use  The following portions of the patient's history were reviewed and updated as appropriate: allergies, current medications, past family history, past medical history, past social history, past surgical history and problem list       Review of Systems   Constitutional: Positive for fatigue  HENT: Positive for sore throat  Eyes: Negative  Respiratory: Positive for cough  Cardiovascular: Negative  Gastrointestinal: Negative  Endocrine: Negative  Genitourinary: Negative  Musculoskeletal: Negative  Skin: Negative  Allergic/Immunologic: Negative  Neurological: Negative  Hematological: Negative  Psychiatric/Behavioral: Negative  Objective:               /88 (BP Location: Right arm, Patient Position: Sitting, Cuff Size: Standard)   Temp 98 7 °F (37 1 °C) (Tympanic)   Ht 5' 10" (1 778 m)   Wt 98 6 kg (217 lb 6 4 oz)   BMI 31 19 kg/m²          Physical Exam  Vitals and nursing note reviewed  Constitutional:       General: He is not in acute distress  Appearance: Normal appearance  He is not ill-appearing, toxic-appearing or diaphoretic  HENT:      Head: Normocephalic and atraumatic  Right Ear: Tympanic membrane, ear canal and external ear normal  There is no impacted cerumen  Left Ear: Tympanic membrane, ear canal and external ear normal  There is no impacted cerumen  Nose: Nose normal  No congestion or rhinorrhea        Mouth/Throat:      Mouth: Mucous membranes are moist  Pharynx: Posterior oropharyngeal erythema present  No oropharyngeal exudate  Eyes:      General: No scleral icterus  Right eye: No discharge  Left eye: No discharge  Extraocular Movements: Extraocular movements intact  Conjunctiva/sclera: Conjunctivae normal       Pupils: Pupils are equal, round, and reactive to light  Neck:      Vascular: No carotid bruit  Cardiovascular:      Rate and Rhythm: Normal rate and regular rhythm  Pulses: Normal pulses  Heart sounds: Normal heart sounds  No murmur heard  No friction rub  No gallop  Pulmonary:      Effort: Pulmonary effort is normal  No respiratory distress  Breath sounds: Normal breath sounds  No stridor  No wheezing, rhonchi or rales  Chest:      Chest wall: No tenderness  Musculoskeletal:         General: No swelling, tenderness, deformity or signs of injury  Normal range of motion  Cervical back: Normal range of motion and neck supple  No rigidity  No muscular tenderness  Right lower leg: No edema  Left lower leg: No edema  Lymphadenopathy:      Cervical: No cervical adenopathy  Skin:     General: Skin is warm and dry  Capillary Refill: Capillary refill takes less than 2 seconds  Coloration: Skin is not jaundiced  Findings: No bruising, erythema, lesion or rash  Neurological:      Mental Status: He is alert and oriented to person, place, and time  Mental status is at baseline  Cranial Nerves: No cranial nerve deficit  Sensory: No sensory deficit  Motor: No weakness  Coordination: Coordination normal       Gait: Gait normal    Psychiatric:         Mood and Affect: Mood normal          Behavior: Behavior normal          Thought Content:  Thought content normal          Judgment: Judgment normal

## 2022-08-15 NOTE — LETTER
August 15, 2022     Patient: Steph Riojas  YOB: 1975  Date of Visit: 8/15/2022      To Whom it May Concern:    Kayden Perez is under my professional care  Dionnealis Tavarez was seen in my office on 8/15/2022  Brenda Tavarez may return to work on 08/22/2022  If you have any questions or concerns, please don't hesitate to call           Sincerely,          Fiona Larson DO        CC: No Recipients

## 2022-08-17 ENCOUNTER — TELEPHONE (OUTPATIENT)
Dept: FAMILY MEDICINE CLINIC | Facility: CLINIC | Age: 47
End: 2022-08-17

## 2022-08-18 DIAGNOSIS — J02.0 STREP SORE THROAT: Primary | ICD-10-CM

## 2022-08-18 DIAGNOSIS — K29.00 ACUTE SUPERFICIAL GASTRITIS WITHOUT HEMORRHAGE: ICD-10-CM

## 2022-08-18 DIAGNOSIS — I10 BENIGN ESSENTIAL HYPERTENSION: ICD-10-CM

## 2022-08-18 DIAGNOSIS — F41.9 ANXIETY: ICD-10-CM

## 2022-08-18 LAB — BACTERIA THROAT CULT: ABNORMAL

## 2022-08-18 RX ORDER — AMOXICILLIN 500 MG/1
TABLET, FILM COATED ORAL
Qty: 21 TABLET | Refills: 0 | Status: SHIPPED | OUTPATIENT
Start: 2022-08-18 | End: 2022-08-25

## 2022-08-18 RX ORDER — OLMESARTAN MEDOXOMIL 20 MG/1
40 TABLET ORAL DAILY
Qty: 180 TABLET | Refills: 1 | Status: SHIPPED | OUTPATIENT
Start: 2022-08-18

## 2022-08-18 RX ORDER — ALPRAZOLAM 1 MG/1
TABLET ORAL
Qty: 120 TABLET | Refills: 5 | Status: SHIPPED | OUTPATIENT
Start: 2022-08-18 | End: 2022-09-16 | Stop reason: SDUPTHER

## 2022-08-18 RX ORDER — OMEPRAZOLE 40 MG/1
40 CAPSULE, DELAYED RELEASE ORAL DAILY
Qty: 90 CAPSULE | Refills: 1 | Status: SHIPPED | OUTPATIENT
Start: 2022-08-18

## 2022-09-16 DIAGNOSIS — F41.9 ANXIETY: ICD-10-CM

## 2022-09-16 RX ORDER — ALPRAZOLAM 1 MG/1
TABLET ORAL
Qty: 120 TABLET | Refills: 0 | Status: SHIPPED | OUTPATIENT
Start: 2022-09-16

## 2023-01-28 ENCOUNTER — TELEPHONE (OUTPATIENT)
Dept: OTHER | Facility: OTHER | Age: 48
End: 2023-01-28

## 2023-01-30 ENCOUNTER — OFFICE VISIT (OUTPATIENT)
Dept: FAMILY MEDICINE CLINIC | Facility: CLINIC | Age: 48
End: 2023-01-30

## 2023-01-30 VITALS
TEMPERATURE: 98.7 F | DIASTOLIC BLOOD PRESSURE: 78 MMHG | OXYGEN SATURATION: 98 % | WEIGHT: 217.8 LBS | HEART RATE: 104 BPM | SYSTOLIC BLOOD PRESSURE: 114 MMHG | BODY MASS INDEX: 31.18 KG/M2 | HEIGHT: 70 IN

## 2023-01-30 DIAGNOSIS — K21.00 GASTROESOPHAGEAL REFLUX DISEASE WITH ESOPHAGITIS WITHOUT HEMORRHAGE: Primary | ICD-10-CM

## 2023-01-30 DIAGNOSIS — F41.9 ANXIETY: ICD-10-CM

## 2023-01-30 DIAGNOSIS — K29.00 ACUTE SUPERFICIAL GASTRITIS WITHOUT HEMORRHAGE: ICD-10-CM

## 2023-01-30 DIAGNOSIS — I10 BENIGN ESSENTIAL HYPERTENSION: ICD-10-CM

## 2023-01-30 RX ORDER — ALPRAZOLAM 1 MG/1
TABLET ORAL
Qty: 120 TABLET | Refills: 5 | Status: SHIPPED | OUTPATIENT
Start: 2023-01-30

## 2023-01-30 RX ORDER — OMEPRAZOLE 40 MG/1
40 CAPSULE, DELAYED RELEASE ORAL DAILY
Qty: 90 CAPSULE | Refills: 1 | Status: SHIPPED | OUTPATIENT
Start: 2023-01-30

## 2023-01-30 RX ORDER — OLMESARTAN MEDOXOMIL 20 MG/1
40 TABLET ORAL DAILY
Qty: 180 TABLET | Refills: 1 | Status: SHIPPED | OUTPATIENT
Start: 2023-01-30

## 2023-01-30 NOTE — PROGRESS NOTES
Assessment/Plan: Labs and records reviewed  Patient go for further laboratory studies  Patient will continue with current regimen for chronic conditions listed below  Refills given at this time  Other guidance given at this time  Patient will follow-up in 6 months or as needed       Diagnoses and all orders for this visit:    Gastroesophageal reflux disease with esophagitis without hemorrhage  -     CBC and differential; Future  -     Comprehensive metabolic panel; Future  -     Lipid panel; Future  -     TSH, 3rd generation with Free T4 reflex; Future    Anxiety  -     ALPRAZolam (XANAX) 1 mg tablet; Take 1 tablet qid prn for anxiety  -     CBC and differential; Future  -     Comprehensive metabolic panel; Future  -     Lipid panel; Future  -     TSH, 3rd generation with Free T4 reflex; Future    Benign essential hypertension  -     olmesartan (BENICAR) 20 mg tablet; Take 2 tablets (40 mg total) by mouth daily  -     CBC and differential; Future  -     Comprehensive metabolic panel; Future  -     Lipid panel; Future  -     TSH, 3rd generation with Free T4 reflex; Future    Acute superficial gastritis without hemorrhage  -     omeprazole (PriLOSEC) 40 MG capsule; Take 1 capsule (40 mg total) by mouth daily  -     CBC and differential; Future  -     Comprehensive metabolic panel; Future  -     Lipid panel; Future  -     TSH, 3rd generation with Free T4 reflex; Future            Subjective:        Patient ID: Agustin Porras is a 50 y o  male  Patient here for follow-up on GERD, anxiety, hypertension  Patient feeling fairly well at this time  No significant anxiety or worsening overall  No chest pain or shortness of breath  No problems urinating or defecating  Reflux emptying stable          The following portions of the patient's history were reviewed and updated as appropriate: allergies, current medications, past family history, past medical history, past social history, past surgical history and problem list       Review of Systems   Constitutional: Negative  HENT: Negative  Eyes: Negative  Respiratory: Negative  Cardiovascular: Negative  Gastrointestinal: Negative  Endocrine: Negative  Genitourinary: Negative  Musculoskeletal: Negative  Skin: Negative  Allergic/Immunologic: Negative  Neurological: Negative  Hematological: Negative  Psychiatric/Behavioral: Negative  Objective:      BMI Counseling: Body mass index is 31 25 kg/m²  The BMI is above normal  Nutrition recommendations include consuming healthier snacks  Exercise recommendations include moderate physical activity 150 minutes/week  Rationale for BMI follow-up plan is due to patient being overweight or obese  Depression Screening and Follow-up Plan: Clincally patient does not have depression  No treatment is required  /78 (BP Location: Right arm, Patient Position: Sitting, Cuff Size: Standard)   Pulse 104   Temp 98 7 °F (37 1 °C) (Temporal)   Ht 5' 10" (1 778 m)   Wt 98 8 kg (217 lb 12 8 oz)   SpO2 98%   BMI 31 25 kg/m²          Physical Exam  Vitals and nursing note reviewed  Constitutional:       General: He is not in acute distress  Appearance: Normal appearance  He is not ill-appearing, toxic-appearing or diaphoretic  HENT:      Head: Normocephalic and atraumatic  Right Ear: Tympanic membrane, ear canal and external ear normal  There is no impacted cerumen  Left Ear: Tympanic membrane, ear canal and external ear normal  There is no impacted cerumen  Nose: Nose normal  No congestion or rhinorrhea  Mouth/Throat:      Mouth: Mucous membranes are moist       Pharynx: No oropharyngeal exudate or posterior oropharyngeal erythema  Eyes:      General: No scleral icterus  Right eye: No discharge  Left eye: No discharge  Extraocular Movements: Extraocular movements intact        Conjunctiva/sclera: Conjunctivae normal  Pupils: Pupils are equal, round, and reactive to light  Neck:      Vascular: No carotid bruit  Cardiovascular:      Rate and Rhythm: Normal rate and regular rhythm  Pulses: Normal pulses  Heart sounds: Normal heart sounds  No murmur heard  No friction rub  No gallop  Pulmonary:      Effort: Pulmonary effort is normal  No respiratory distress  Breath sounds: Normal breath sounds  No stridor  No wheezing, rhonchi or rales  Chest:      Chest wall: No tenderness  Musculoskeletal:         General: No swelling, tenderness, deformity or signs of injury  Normal range of motion  Cervical back: Normal range of motion and neck supple  No rigidity  No muscular tenderness  Right lower leg: No edema  Left lower leg: No edema  Lymphadenopathy:      Cervical: No cervical adenopathy  Skin:     General: Skin is warm and dry  Capillary Refill: Capillary refill takes less than 2 seconds  Coloration: Skin is not jaundiced  Findings: No bruising, erythema, lesion or rash  Neurological:      Mental Status: He is alert and oriented to person, place, and time  Mental status is at baseline  Cranial Nerves: No cranial nerve deficit  Sensory: No sensory deficit  Motor: No weakness  Coordination: Coordination normal       Gait: Gait normal    Psychiatric:         Mood and Affect: Mood normal          Behavior: Behavior normal          Thought Content:  Thought content normal          Judgment: Judgment normal

## 2023-02-13 ENCOUNTER — TELEPHONE (OUTPATIENT)
Dept: FAMILY MEDICINE CLINIC | Facility: CLINIC | Age: 48
End: 2023-02-13

## 2023-02-13 NOTE — TELEPHONE ENCOUNTER
Per the automated system through [facility], the auth for Olmesartan is still pending review as of 2/13/2023        Case #: [id number]

## 2023-02-21 NOTE — TELEPHONE ENCOUNTER
Patient was denied for medication Valeta Backers) on 02/21/22, due to quantity, denial letter was given to Dr Akbar Levi for change or alternative

## 2023-02-22 DIAGNOSIS — I10 BENIGN ESSENTIAL HYPERTENSION: Primary | ICD-10-CM

## 2023-02-22 RX ORDER — OLMESARTAN MEDOXOMIL 40 MG/1
40 TABLET ORAL DAILY
Qty: 90 TABLET | Refills: 1 | Status: SHIPPED | OUTPATIENT
Start: 2023-02-22

## 2023-03-23 NOTE — TELEPHONE ENCOUNTER
New Rx for Benicar 40 mgs 1 qd sent to Dr Bryce Ledezma to sign, pt notified that a new Rx was sent    20 bid is not covered by his ins 1

## 2023-03-29 ENCOUNTER — TELEPHONE (OUTPATIENT)
Dept: FAMILY MEDICINE CLINIC | Facility: CLINIC | Age: 48
End: 2023-03-29

## 2023-03-29 DIAGNOSIS — Z12.5 SCREENING FOR PROSTATE CANCER: Primary | ICD-10-CM

## 2023-03-29 DIAGNOSIS — R60.0 LOWER EXTREMITY EDEMA: ICD-10-CM

## 2023-03-29 NOTE — TELEPHONE ENCOUNTER
Patient called into the office stating he is going for BW and is requesting to have prostate tests ordered in addition to the labs ordered 1/30  He is having swelling in his feet/legs and wants it checked  Can the prostate be checked? Please fax to [facility] @ [phone]

## 2023-04-07 ENCOUNTER — TELEPHONE (OUTPATIENT)
Dept: FAMILY MEDICINE CLINIC | Facility: CLINIC | Age: 48
End: 2023-04-07

## 2023-04-07 DIAGNOSIS — R73.09 ELEVATED GLUCOSE: ICD-10-CM

## 2023-04-07 DIAGNOSIS — E78.00 HYPERCHOLESTEROLEMIA: Primary | ICD-10-CM

## 2023-07-10 DIAGNOSIS — F41.9 ANXIETY: ICD-10-CM

## 2023-07-11 RX ORDER — ALPRAZOLAM 1 MG/1
TABLET ORAL
Qty: 120 TABLET | Refills: 0 | Status: SHIPPED | OUTPATIENT
Start: 2023-07-11

## 2023-08-09 ENCOUNTER — OFFICE VISIT (OUTPATIENT)
Dept: FAMILY MEDICINE CLINIC | Facility: CLINIC | Age: 48
End: 2023-08-09
Payer: COMMERCIAL

## 2023-08-09 VITALS
WEIGHT: 226 LBS | DIASTOLIC BLOOD PRESSURE: 70 MMHG | RESPIRATION RATE: 18 BRPM | OXYGEN SATURATION: 98 % | SYSTOLIC BLOOD PRESSURE: 110 MMHG | BODY MASS INDEX: 32.35 KG/M2 | HEIGHT: 70 IN | TEMPERATURE: 97.8 F | HEART RATE: 78 BPM

## 2023-08-09 DIAGNOSIS — K29.00 ACUTE SUPERFICIAL GASTRITIS WITHOUT HEMORRHAGE: ICD-10-CM
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
## 2023-08-09 DIAGNOSIS — I10 BENIGN ESSENTIAL HYPERTENSION: ICD-10-CM

## 2023-08-09 DIAGNOSIS — F41.9 ANXIETY: ICD-10-CM

## 2023-08-09 DIAGNOSIS — S30.861A INSECT BITE OF ABDOMINAL WALL, INITIAL ENCOUNTER: Primary | ICD-10-CM

## 2023-08-09 PROCEDURE — 99214 OFFICE O/P EST MOD 30 MIN: CPT | Performed by: FAMILY MEDICINE

## 2023-08-09 RX ORDER — OMEPRAZOLE 40 MG/1
40 CAPSULE, DELAYED RELEASE ORAL DAILY
Qty: 90 CAPSULE | Refills: 1 | Status: SHIPPED | OUTPATIENT
Start: 2023-08-09

## 2023-08-09 RX ORDER — OLMESARTAN MEDOXOMIL 40 MG/1
40 TABLET ORAL DAILY
Qty: 90 TABLET | Refills: 1 | Status: SHIPPED | OUTPATIENT
Start: 2023-08-09

## 2023-08-09 RX ORDER — ALPRAZOLAM 1 MG/1
TABLET ORAL
Qty: 120 TABLET | Refills: 0 | Status: SHIPPED | OUTPATIENT
Start: 2023-08-09

## 2023-08-09 NOTE — PROGRESS NOTES
Assessment/Plan:      Diagnoses and all orders for this visit:    Insect bite of abdominal wall, initial encounter  -     mupirocin (BACTROBAN) 2 % ointment; Apply topically 3 (three) times a day    Anxiety  -     ALPRAZolam (XANAX) 1 mg tablet; take 1 tablet by mouth four times a day for anxiety    Benign essential hypertension  -     olmesartan (BENICAR) 40 mg tablet; Take 1 tablet (40 mg total) by mouth daily    Acute superficial gastritis without hemorrhage  -     omeprazole (PriLOSEC) 40 MG capsule; Take 1 capsule (40 mg total) by mouth daily          Subjective:     Patient ID: Lo Leone is a 50 y.o. male. Patient presents with: Insect Bite: Patient is complaining of a spider bite below the naval on 8/1. Patient states area is swollen. LR  Medication Refill          Review of Systems   Constitutional: Negative. HENT: Negative. Eyes: Negative. Respiratory: Negative. Cardiovascular: Negative. Gastrointestinal: Negative. Endocrine: Negative. Genitourinary: Negative. Musculoskeletal: Negative. Skin: Positive for wound. Allergic/Immunologic: Negative. Neurological: Negative. Hematological: Negative. Psychiatric/Behavioral: Negative. All other systems reviewed and are negative. Objective:     Physical Exam  Constitutional:       Appearance: He is well-developed. HENT:      Head: Normocephalic and atraumatic. Eyes:      Pupils: Pupils are equal, round, and reactive to light. Cardiovascular:      Rate and Rhythm: Normal rate. Pulmonary:      Effort: Pulmonary effort is normal.      Breath sounds: No wheezing. Abdominal:      Palpations: Abdomen is soft. Musculoskeletal:      Cervical back: Normal range of motion and neck supple. Lymphadenopathy:      Cervical: No cervical adenopathy. Skin:     General: Skin is warm. Comments: Red raised and scaling lesion on lower abdomen below umbilicus.    Neurological:      Mental Status: He is alert and oriented to person, place, and time.

## 2023-09-18 DIAGNOSIS — F41.9 ANXIETY: ICD-10-CM

## 2023-09-18 RX ORDER — ALPRAZOLAM 1 MG/1
TABLET ORAL
Qty: 120 TABLET | Refills: 0 | OUTPATIENT
Start: 2023-09-18

## 2023-09-19 RX ORDER — ALPRAZOLAM 1 MG/1
TABLET ORAL
Qty: 120 TABLET | Refills: 0 | Status: SHIPPED | OUTPATIENT
Start: 2023-09-19

## 2023-10-24 DIAGNOSIS — F41.9 ANXIETY: ICD-10-CM

## 2023-10-24 RX ORDER — ALPRAZOLAM 1 MG/1
TABLET ORAL
Qty: 15 TABLET | Refills: 0 | Status: SHIPPED | OUTPATIENT
Start: 2023-10-24

## 2023-12-07 ENCOUNTER — OFFICE VISIT (OUTPATIENT)
Dept: FAMILY MEDICINE CLINIC | Facility: CLINIC | Age: 48
End: 2023-12-07
Payer: COMMERCIAL

## 2023-12-07 VITALS
WEIGHT: 215.6 LBS | BODY MASS INDEX: 30.86 KG/M2 | DIASTOLIC BLOOD PRESSURE: 76 MMHG | TEMPERATURE: 97.7 F | SYSTOLIC BLOOD PRESSURE: 145 MMHG | OXYGEN SATURATION: 97 % | HEART RATE: 82 BPM | HEIGHT: 70 IN

## 2023-12-07 DIAGNOSIS — I10 BENIGN ESSENTIAL HYPERTENSION: Primary | ICD-10-CM

## 2023-12-07 DIAGNOSIS — Z00.00 WELL ADULT EXAM: ICD-10-CM

## 2023-12-07 DIAGNOSIS — Z12.12 ENCOUNTER FOR COLORECTAL CANCER SCREENING: ICD-10-CM

## 2023-12-07 DIAGNOSIS — F90.2 ATTENTION DEFICIT HYPERACTIVITY DISORDER (ADHD), COMBINED TYPE: ICD-10-CM

## 2023-12-07 DIAGNOSIS — K29.00 ACUTE SUPERFICIAL GASTRITIS WITHOUT HEMORRHAGE: ICD-10-CM

## 2023-12-07 DIAGNOSIS — Z12.11 COLON CANCER SCREENING: ICD-10-CM

## 2023-12-07 DIAGNOSIS — E78.00 HYPERCHOLESTEROLEMIA: ICD-10-CM

## 2023-12-07 DIAGNOSIS — Z12.11 ENCOUNTER FOR COLORECTAL CANCER SCREENING: ICD-10-CM

## 2023-12-07 DIAGNOSIS — F41.9 ANXIETY: ICD-10-CM

## 2023-12-07 PROCEDURE — 99214 OFFICE O/P EST MOD 30 MIN: CPT | Performed by: FAMILY MEDICINE

## 2023-12-07 PROCEDURE — 99396 PREV VISIT EST AGE 40-64: CPT | Performed by: FAMILY MEDICINE

## 2023-12-07 RX ORDER — OLMESARTAN MEDOXOMIL 40 MG/1
40 TABLET ORAL DAILY
Qty: 90 TABLET | Refills: 1 | Status: SHIPPED | OUTPATIENT
Start: 2023-12-07

## 2023-12-07 RX ORDER — OMEPRAZOLE 40 MG/1
40 CAPSULE, DELAYED RELEASE ORAL DAILY
Qty: 90 CAPSULE | Refills: 1 | Status: SHIPPED | OUTPATIENT
Start: 2023-12-07

## 2023-12-07 RX ORDER — LISDEXAMFETAMINE DIMESYLATE CAPSULES 30 MG/1
30 CAPSULE ORAL EVERY MORNING
Qty: 30 CAPSULE | Refills: 0 | Status: SHIPPED | OUTPATIENT
Start: 2023-12-07

## 2023-12-07 RX ORDER — ALPRAZOLAM 1 MG/1
1 TABLET ORAL DAILY
Qty: 90 TABLET | Refills: 1 | Status: SHIPPED | OUTPATIENT
Start: 2023-12-07

## 2023-12-07 NOTE — PROGRESS NOTES
Assessment/Plan:       Diagnoses and all orders for this visit:    Benign essential hypertension  -     olmesartan (BENICAR) 40 mg tablet; Take 1 tablet (40 mg total) by mouth daily  -     CBC and differential; Future  -     Comprehensive metabolic panel; Future  -     Lipid panel; Future  -     TSH, 3rd generation with Free T4 reflex; Future    Acute superficial gastritis without hemorrhage  -     omeprazole (PriLOSEC) 40 MG capsule; Take 1 capsule (40 mg total) by mouth daily  -     CBC and differential; Future  -     Comprehensive metabolic panel; Future  -     Lipid panel; Future  -     TSH, 3rd generation with Free T4 reflex; Future    Colon cancer screening  -     Ambulatory Referral to Colorectal Surgery; Future    Anxiety  -     ALPRAZolam (XANAX) 1 mg tablet; Take 1 tablet (1 mg total) by mouth daily take 1 tablet by mouth four times a day for anxiety    Attention deficit hyperactivity disorder (ADHD), combined type  -     CBC and differential; Future  -     Comprehensive metabolic panel; Future  -     Lipid panel; Future  -     TSH, 3rd generation with Free T4 reflex; Future  -     lisdexamfetamine (Vyvanse) 30 MG capsule; Take 1 capsule (30 mg total) by mouth every morning Max Daily Amount: 30 mg    Encounter for colorectal cancer screening  -     Ambulatory Referral to Gastroenterology; Future    Hypercholesterolemia    Well adult exam            Subjective:        Patient ID: Albert Call is a 50 y.o. male. Patient is here for wellness exam as well as follow-up on hypertension hyperlipidemia and anxiety. Patient has been off Xanax. Patient has noticed more of his issues are associated with focus and concentration and staying on task. Patient would like to try medication for ADHD. Patient not feeling sick. Patient otherwise in normal state of health. Labs and vaccines reviewed.   Patient is due for colorectal screening and has had colonoscopy in the past.  PSA level done in the past which was normal.  Patient would like to have laboratory studies. Patient with swelling of lower extremities. Patient was treated as a child for ADHD. Patient does have anxiety in the morning. Patient off blood pressure medication at this time. Patient will need refills. Anxiety  Symptoms include decreased concentration and nervous/anxious behavior. The following portions of the patient's history were reviewed and updated as appropriate: allergies, current medications, past family history, past medical history, past social history, past surgical history and problem list.      Review of Systems   Constitutional: Negative. HENT: Negative. Eyes: Negative. Respiratory: Negative. Cardiovascular: Negative. Gastrointestinal: Negative. Endocrine: Negative. Genitourinary: Negative. Musculoskeletal: Negative. Skin: Negative. Allergic/Immunologic: Negative. Neurological: Negative. Hematological: Negative. Psychiatric/Behavioral:  Positive for decreased concentration. The patient is nervous/anxious. Objective:        Depression Screening and Follow-up Plan: Patient was screened for depression during today's encounter. They screened negative with a PHQ-2 score of 0.            /76 (BP Location: Right arm, Patient Position: Sitting, Cuff Size: Large)   Pulse 82   Temp 97.7 °F (36.5 °C) (Tympanic)   Ht 5' 10" (1.778 m)   Wt 97.8 kg (215 lb 9.6 oz)   SpO2 97%   BMI 30.94 kg/m²          Physical Exam  Vitals and nursing note reviewed. Constitutional:       General: He is not in acute distress. Appearance: Normal appearance. He is not ill-appearing, toxic-appearing or diaphoretic. HENT:      Head: Normocephalic and atraumatic. Right Ear: Tympanic membrane, ear canal and external ear normal. There is no impacted cerumen. Left Ear: Tympanic membrane, ear canal and external ear normal. There is no impacted cerumen.       Nose: Nose normal. No congestion or rhinorrhea. Mouth/Throat:      Mouth: Mucous membranes are moist.      Pharynx: No oropharyngeal exudate or posterior oropharyngeal erythema. Eyes:      General: No scleral icterus. Right eye: No discharge. Left eye: No discharge. Neck:      Vascular: No carotid bruit. Cardiovascular:      Rate and Rhythm: Normal rate and regular rhythm. Pulses: Normal pulses. Heart sounds: Normal heart sounds. No murmur heard. No friction rub. No gallop. Pulmonary:      Effort: Pulmonary effort is normal. No respiratory distress. Breath sounds: Normal breath sounds. No stridor. No wheezing, rhonchi or rales. Chest:      Chest wall: No tenderness. Musculoskeletal:         General: No swelling, tenderness, deformity or signs of injury. Normal range of motion. Cervical back: Normal range of motion and neck supple. No rigidity. No muscular tenderness. Right lower leg: No edema. Left lower leg: No edema. Lymphadenopathy:      Cervical: No cervical adenopathy. Skin:     General: Skin is warm and dry. Capillary Refill: Capillary refill takes less than 2 seconds. Coloration: Skin is not jaundiced. Findings: No bruising, erythema, lesion or rash. Neurological:      General: No focal deficit present. Mental Status: He is alert and oriented to person, place, and time. Cranial Nerves: No cranial nerve deficit. Sensory: No sensory deficit. Motor: No weakness. Coordination: Coordination normal.      Gait: Gait normal.   Psychiatric:         Mood and Affect: Mood normal.         Behavior: Behavior normal.         Thought Content:  Thought content normal.         Judgment: Judgment normal.

## 2023-12-19 ENCOUNTER — TELEPHONE (OUTPATIENT)
Age: 48
End: 2023-12-19

## 2023-12-19 NOTE — TELEPHONE ENCOUNTER
Scheduled date of colonoscopy (as of today):1/30/24  Physician performing colonoscopy:  Location of colonoscopy:[facility]  Bowel prep reviewed with patient:[email or web address]/[email or web address] SENT TO [email or web address] [email or web address]  Instructions reviewed with patient by:HAO  Clearances:   N/A      Veda   \[phone]

## 2023-12-19 NOTE — TELEPHONE ENCOUNTER
12/19/23  Screened by: Edelmira Martins    Referring Provider Dr. Grajeda    Pre- Screening:     There is no height or weight on file to calculate BMI.  Has patient been referred for a routine screening Colonoscopy? yes  Is the patient between 45-75 years old? yes      Previous Colonoscopy yes   If yes:    Date: 7 years ago    Facility:     Reason:       SCHEDULING STAFF: If the patient is between 45yrs-49yrs, please advise patient to confirm benefits/coverage with their insurance company for a routine screening colonoscopy, some insurance carriers will only cover at 50yrs or older. If the patient is over 75years old, please schedule an office visit.     Does the patient want to see a Gastroenterologist prior to their procedure OR are they having any GI symptoms? no    Has the patient been hospitalized or had abdominal surgery in the past 6 months? no    Does the patient use supplemental oxygen? no    Does the patient take Coumadin, Lovenox, Plavix, Elliquis, Xarelto, or other blood thinning medication? no    Has the patient had a stroke, cardiac event, or stent placed in the past year? no    SCHEDULING STAFF: If patient answers NO to above questions, then schedule procedure. If patient answers YES to above questions, then schedule office appointment.     If patient is between 45yrs - 49yrs, please advise patient that we will have to confirm benefits & coverage with their insurance company for a routine screening colonoscopy.

## 2023-12-22 NOTE — TELEPHONE ENCOUNTER
Scheduled date of colonoscopy (as of today): 1/30/24  Physician performing colonoscopy:   Location of colonoscopy: [place]  Bowel prep reviewed with patient: Miralax/Dulcolax  Instructions reviewed with patient by: mailed  Clearances: N/A

## 2024-01-09 ENCOUNTER — OFFICE VISIT (OUTPATIENT)
Dept: FAMILY MEDICINE CLINIC | Facility: CLINIC | Age: 49
End: 2024-01-09
Payer: COMMERCIAL

## 2024-01-09 VITALS
HEIGHT: 70 IN | HEART RATE: 80 BPM | TEMPERATURE: 96.6 F | BODY MASS INDEX: 31.3 KG/M2 | DIASTOLIC BLOOD PRESSURE: 80 MMHG | SYSTOLIC BLOOD PRESSURE: 110 MMHG | OXYGEN SATURATION: 98 % | WEIGHT: 218.6 LBS

## 2024-01-09 DIAGNOSIS — F41.9 ANXIETY: ICD-10-CM

## 2024-01-09 DIAGNOSIS — F90.2 ATTENTION DEFICIT HYPERACTIVITY DISORDER (ADHD), COMBINED TYPE: Primary | ICD-10-CM

## 2024-01-09 PROCEDURE — 99214 OFFICE O/P EST MOD 30 MIN: CPT | Performed by: FAMILY MEDICINE

## 2024-01-09 RX ORDER — ALPRAZOLAM 1 MG/1
1 TABLET ORAL DAILY
Qty: 360 TABLET | Refills: 1 | Status: SHIPPED | OUTPATIENT
Start: 2024-01-09

## 2024-01-09 RX ORDER — DEXTROAMPHETAMINE SACCHARATE, AMPHETAMINE ASPARTATE, DEXTROAMPHETAMINE SULFATE AND AMPHETAMINE SULFATE 2.5; 2.5; 2.5; 2.5 MG/1; MG/1; MG/1; MG/1
10 TABLET ORAL
Qty: 60 TABLET | Refills: 0 | Status: SHIPPED | OUTPATIENT
Start: 2024-01-09

## 2024-01-09 NOTE — PROGRESS NOTES
Assessment/Plan: Patient will stop Vyvanse and start Adderall.  Patient was asked to increase to 1 tablet 4 times a day which she was doing previously for anxiety which is well-controlled.  Patient will try Adderall for ADHD.  Patient will follow-up in 1 month or as needed       Diagnoses and all orders for this visit:    Attention deficit hyperactivity disorder (ADHD), combined type  -     amphetamine-dextroamphetamine (ADDERALL, 10MG,) 10 mg tablet; Take 1 tablet (10 mg total) by mouth 2 (two) times a day Max Daily Amount: 20 mg    Anxiety  -     ALPRAZolam (XANAX) 1 mg tablet; Take 1 tablet (1 mg total) by mouth daily take 1 tablet by mouth four times a day for anxiety            Subjective:        Patient ID: Martell Mejía is a 48 y.o. male.      Patient is here to follow-up on ADHD as well as anxiety.  Patient with ongoing difficulty with ADHD.  Patient to use Vyvanse without any significant improvement.  Patient's son on Adderall with good results.  Patient will be going for colonoscopy on the 30th.  Patient with worsening anxiety being decreased on Xanax.          The following portions of the patient's history were reviewed and updated as appropriate: allergies, current medications, past family history, past medical history, past social history, past surgical history and problem list.      Review of Systems   Constitutional: Negative.    HENT: Negative.     Eyes: Negative.    Respiratory: Negative.     Cardiovascular: Negative.    Gastrointestinal: Negative.    Endocrine: Negative.    Genitourinary: Negative.    Musculoskeletal: Negative.    Skin: Negative.    Allergic/Immunologic: Negative.    Neurological: Negative.    Hematological: Negative.    Psychiatric/Behavioral:  Positive for decreased concentration. The patient is nervous/anxious.            Objective:               /80 (BP Location: Right arm, Patient Position: Sitting, Cuff Size: Large)   Pulse 80   Temp (!) 96.6 °F (35.9 °C) (Tympanic)  "  Ht 5' 10\" (1.778 m)   Wt 99.2 kg (218 lb 9.6 oz)   SpO2 98%   BMI 31.37 kg/m²          Physical Exam  Vitals and nursing note reviewed.   Constitutional:       General: He is not in acute distress.     Appearance: Normal appearance. He is not ill-appearing, toxic-appearing or diaphoretic.   HENT:      Head: Normocephalic and atraumatic.      Right Ear: Tympanic membrane, ear canal and external ear normal. There is no impacted cerumen.      Left Ear: Tympanic membrane, ear canal and external ear normal. There is no impacted cerumen.      Nose: Nose normal. No congestion or rhinorrhea.      Mouth/Throat:      Mouth: Mucous membranes are moist.      Pharynx: No oropharyngeal exudate or posterior oropharyngeal erythema.   Eyes:      General: No scleral icterus.        Right eye: No discharge.         Left eye: No discharge.      Extraocular Movements: Extraocular movements intact.      Conjunctiva/sclera: Conjunctivae normal.      Pupils: Pupils are equal, round, and reactive to light.   Neck:      Vascular: No carotid bruit.   Cardiovascular:      Rate and Rhythm: Normal rate and regular rhythm.      Pulses: Normal pulses.      Heart sounds: Normal heart sounds. No murmur heard.     No friction rub. No gallop.   Pulmonary:      Effort: Pulmonary effort is normal. No respiratory distress.      Breath sounds: Normal breath sounds. No stridor. No wheezing, rhonchi or rales.   Chest:      Chest wall: No tenderness.   Musculoskeletal:         General: No swelling, tenderness, deformity or signs of injury. Normal range of motion.      Cervical back: Normal range of motion and neck supple. No rigidity. No muscular tenderness.      Right lower leg: No edema.      Left lower leg: No edema.   Lymphadenopathy:      Cervical: No cervical adenopathy.   Skin:     General: Skin is warm and dry.      Capillary Refill: Capillary refill takes less than 2 seconds.      Coloration: Skin is not jaundiced.      Findings: No bruising, " erythema, lesion or rash.   Neurological:      Mental Status: He is alert and oriented to person, place, and time. Mental status is at baseline.      Cranial Nerves: No cranial nerve deficit.      Sensory: No sensory deficit.      Motor: No weakness.      Coordination: Coordination normal.      Gait: Gait normal.   Psychiatric:         Behavior: Behavior normal.         Thought Content: Thought content normal.         Judgment: Judgment normal.      Comments: Anxious

## 2024-01-15 ENCOUNTER — ANESTHESIA (OUTPATIENT)
Dept: ANESTHESIOLOGY | Facility: HOSPITAL | Age: 49
End: 2024-01-15

## 2024-01-15 ENCOUNTER — TELEPHONE (OUTPATIENT)
Dept: GASTROENTEROLOGY | Facility: CLINIC | Age: 49
End: 2024-01-15

## 2024-01-15 ENCOUNTER — ANESTHESIA EVENT (OUTPATIENT)
Dept: ANESTHESIOLOGY | Facility: HOSPITAL | Age: 49
End: 2024-01-15

## 2024-01-29 RX ORDER — ALBUTEROL SULFATE 2.5 MG/3ML
2.5 SOLUTION RESPIRATORY (INHALATION) ONCE AS NEEDED
OUTPATIENT
Start: 2024-01-29

## 2024-01-29 RX ORDER — ONDANSETRON 2 MG/ML
4 INJECTION INTRAMUSCULAR; INTRAVENOUS ONCE AS NEEDED
OUTPATIENT
Start: 2024-01-29

## 2024-01-29 RX ORDER — SODIUM CHLORIDE 9 MG/ML
125 INJECTION, SOLUTION INTRAVENOUS CONTINUOUS
OUTPATIENT
Start: 2024-01-29

## 2024-02-05 PROBLEM — Z00.00 WELL ADULT EXAM: Status: RESOLVED | Noted: 2023-12-07 | Resolved: 2024-02-05

## 2024-02-21 PROBLEM — A08.4 VIRAL GASTROENTERITIS: Status: RESOLVED | Noted: 2018-06-27 | Resolved: 2024-02-21

## 2024-02-27 ENCOUNTER — OFFICE VISIT (OUTPATIENT)
Dept: FAMILY MEDICINE CLINIC | Facility: CLINIC | Age: 49
End: 2024-02-27
Payer: COMMERCIAL

## 2024-02-27 VITALS
OXYGEN SATURATION: 98 % | SYSTOLIC BLOOD PRESSURE: 112 MMHG | TEMPERATURE: 98 F | WEIGHT: 227.2 LBS | RESPIRATION RATE: 21 BRPM | DIASTOLIC BLOOD PRESSURE: 72 MMHG | BODY MASS INDEX: 32.53 KG/M2 | HEART RATE: 75 BPM | HEIGHT: 70 IN

## 2024-02-27 DIAGNOSIS — K21.00 GASTROESOPHAGEAL REFLUX DISEASE WITH ESOPHAGITIS WITHOUT HEMORRHAGE: Primary | ICD-10-CM

## 2024-02-27 DIAGNOSIS — K29.00 ACUTE SUPERFICIAL GASTRITIS WITHOUT HEMORRHAGE: ICD-10-CM

## 2024-02-27 DIAGNOSIS — F90.2 ATTENTION DEFICIT HYPERACTIVITY DISORDER (ADHD), COMBINED TYPE: ICD-10-CM

## 2024-02-27 PROCEDURE — 99214 OFFICE O/P EST MOD 30 MIN: CPT | Performed by: FAMILY MEDICINE

## 2024-02-27 RX ORDER — OMEPRAZOLE 40 MG/1
40 CAPSULE, DELAYED RELEASE ORAL DAILY
Qty: 90 CAPSULE | Refills: 1 | Status: SHIPPED | OUTPATIENT
Start: 2024-02-27

## 2024-02-27 RX ORDER — OMEPRAZOLE 40 MG/1
40 CAPSULE, DELAYED RELEASE ORAL DAILY
Qty: 90 CAPSULE | Refills: 1 | Status: SHIPPED | OUTPATIENT
Start: 2024-02-27 | End: 2024-02-27 | Stop reason: SDUPTHER

## 2024-02-27 RX ORDER — DEXTROAMPHETAMINE SACCHARATE, AMPHETAMINE ASPARTATE, DEXTROAMPHETAMINE SULFATE AND AMPHETAMINE SULFATE 2.5; 2.5; 2.5; 2.5 MG/1; MG/1; MG/1; MG/1
10 TABLET ORAL
Qty: 60 TABLET | Refills: 0 | Status: CANCELLED | OUTPATIENT
Start: 2024-02-27

## 2024-02-27 RX ORDER — DEXTROAMPHETAMINE SACCHARATE, AMPHETAMINE ASPARTATE, DEXTROAMPHETAMINE SULFATE AND AMPHETAMINE SULFATE 2.5; 2.5; 2.5; 2.5 MG/1; MG/1; MG/1; MG/1
10 TABLET ORAL
Qty: 60 TABLET | Refills: 0 | Status: SHIPPED | OUTPATIENT
Start: 2024-02-27

## 2024-02-27 NOTE — PROGRESS NOTES
Assessment/Plan: Labs/records reviewed.  Patient will continue with omeprazole for gastritis which is stable at this time.  Refills given at this time.  Patient will continue with Adderall for ADHD.  This is improved overall with use of medication.  Patient is able to decrease Xanax intermittently for anxiety which is still present but improved.  Follow-up in 6 months       Diagnoses and all orders for this visit:    Gastroesophageal reflux disease with esophagitis without hemorrhage    Acute superficial gastritis without hemorrhage  -     Discontinue: omeprazole (PriLOSEC) 40 MG capsule; Take 1 capsule (40 mg total) by mouth daily  -     omeprazole (PriLOSEC) 40 MG capsule; Take 1 capsule (40 mg total) by mouth daily    Attention deficit hyperactivity disorder (ADHD), combined type  -     amphetamine-dextroamphetamine (ADDERALL, 10MG,) 10 mg tablet; Take 1 tablet (10 mg total) by mouth 2 (two) times a day Max Daily Amount: 20 mg            Subjective:        Patient ID: Martell Mejía is a 49 y.o. male.      Patient is here to follow-up on gastritis as well as ADHD.  Patient doing well at this time.  No significant reflux symptoms.  No chest pain palpitations shortness of breath dizziness with Adderall.  Patient able to focus better and concentrate better.  Decrease Xanax use with this.          The following portions of the patient's history were reviewed and updated as appropriate: allergies, current medications, past family history, past medical history, past social history, past surgical history and problem list.      Review of Systems   Constitutional: Negative.    HENT: Negative.     Eyes: Negative.    Respiratory: Negative.     Cardiovascular: Negative.    Gastrointestinal: Negative.    Endocrine: Negative.    Genitourinary: Negative.    Musculoskeletal: Negative.    Skin: Negative.    Allergic/Immunologic: Negative.    Neurological: Negative.    Hematological: Negative.    Psychiatric/Behavioral:  The  "patient is nervous/anxious.            Objective:        Depression Screening and Follow-up Plan: Patient was screened for depression during today's encounter. They screened negative with a PHQ-2 score of 0.            /72 (BP Location: Right arm, Patient Position: Sitting, Cuff Size: Large)   Pulse 75   Temp 98 °F (36.7 °C) (Temporal)   Resp 21   Ht 5' 10\" (1.778 m)   Wt 103 kg (227 lb 3.2 oz)   SpO2 98%   BMI 32.60 kg/m²          Physical Exam  Vitals and nursing note reviewed.   Constitutional:       General: He is not in acute distress.     Appearance: Normal appearance. He is not ill-appearing, toxic-appearing or diaphoretic.   HENT:      Head: Normocephalic and atraumatic.      Right Ear: Tympanic membrane, ear canal and external ear normal. There is no impacted cerumen.      Left Ear: Tympanic membrane, ear canal and external ear normal. There is no impacted cerumen.      Nose: Nose normal. No congestion or rhinorrhea.   Eyes:      General: No scleral icterus.        Right eye: No discharge.         Left eye: No discharge.   Neck:      Vascular: No carotid bruit.   Cardiovascular:      Rate and Rhythm: Normal rate and regular rhythm.      Pulses: Normal pulses.      Heart sounds: Normal heart sounds. No murmur heard.     No friction rub. No gallop.   Pulmonary:      Effort: Pulmonary effort is normal. No respiratory distress.      Breath sounds: Normal breath sounds. No stridor. No wheezing, rhonchi or rales.   Chest:      Chest wall: No tenderness.   Musculoskeletal:         General: No swelling, tenderness, deformity or signs of injury. Normal range of motion.      Cervical back: Normal range of motion and neck supple. No rigidity. No muscular tenderness.      Right lower leg: No edema.      Left lower leg: No edema.   Lymphadenopathy:      Cervical: No cervical adenopathy.   Skin:     General: Skin is warm and dry.      Capillary Refill: Capillary refill takes less than 2 seconds.      " Coloration: Skin is not jaundiced.      Findings: No bruising, erythema, lesion or rash.   Neurological:      Mental Status: He is alert and oriented to person, place, and time. Mental status is at baseline.      Cranial Nerves: No cranial nerve deficit.      Sensory: No sensory deficit.      Motor: No weakness.      Coordination: Coordination normal.      Gait: Gait normal.   Psychiatric:         Mood and Affect: Mood normal.         Behavior: Behavior normal.         Thought Content: Thought content normal.         Judgment: Judgment normal.

## 2024-04-18 DIAGNOSIS — F90.2 ATTENTION DEFICIT HYPERACTIVITY DISORDER (ADHD), COMBINED TYPE: ICD-10-CM

## 2024-04-18 NOTE — TELEPHONE ENCOUNTER
Medication Refill Request     Name Adderall    Dose/Frequency 10 mg, take 1 tab BID  Quantity 60  Verified pharmacy   [x]  Verified ordering Provider   [x]  Does patient have enough for the next 3 days? Yes [] No [x]

## 2024-04-19 RX ORDER — DEXTROAMPHETAMINE SACCHARATE, AMPHETAMINE ASPARTATE, DEXTROAMPHETAMINE SULFATE AND AMPHETAMINE SULFATE 2.5; 2.5; 2.5; 2.5 MG/1; MG/1; MG/1; MG/1
10 TABLET ORAL
Qty: 60 TABLET | Refills: 0 | Status: SHIPPED | OUTPATIENT
Start: 2024-04-19

## 2024-07-27 DIAGNOSIS — F41.9 ANXIETY: ICD-10-CM

## 2024-07-29 RX ORDER — ALPRAZOLAM 1 MG/1
1 TABLET ORAL DAILY
Qty: 120 TABLET | Refills: 0 | Status: SHIPPED | OUTPATIENT
Start: 2024-07-29 | End: 2024-08-01

## 2024-07-31 DIAGNOSIS — F41.9 ANXIETY: ICD-10-CM

## 2024-08-01 RX ORDER — ALPRAZOLAM 1 MG/1
1 TABLET ORAL DAILY
Qty: 120 TABLET | Refills: 0 | Status: SHIPPED | OUTPATIENT
Start: 2024-08-01

## 2024-09-24 ENCOUNTER — OFFICE VISIT (OUTPATIENT)
Age: 49
End: 2024-09-24
Payer: COMMERCIAL

## 2024-09-24 VITALS
BODY MASS INDEX: 31.52 KG/M2 | WEIGHT: 220.2 LBS | DIASTOLIC BLOOD PRESSURE: 74 MMHG | TEMPERATURE: 97.3 F | SYSTOLIC BLOOD PRESSURE: 118 MMHG | HEIGHT: 70 IN | OXYGEN SATURATION: 97 % | HEART RATE: 58 BPM

## 2024-09-24 DIAGNOSIS — I10 BENIGN ESSENTIAL HYPERTENSION: ICD-10-CM

## 2024-09-24 DIAGNOSIS — F41.9 ANXIETY: ICD-10-CM

## 2024-09-24 DIAGNOSIS — F90.2 ATTENTION DEFICIT HYPERACTIVITY DISORDER (ADHD), COMBINED TYPE: ICD-10-CM

## 2024-09-24 DIAGNOSIS — K29.00 ACUTE SUPERFICIAL GASTRITIS WITHOUT HEMORRHAGE: ICD-10-CM

## 2024-09-24 PROCEDURE — 99214 OFFICE O/P EST MOD 30 MIN: CPT | Performed by: FAMILY MEDICINE

## 2024-09-24 RX ORDER — OMEPRAZOLE 40 MG/1
40 CAPSULE, DELAYED RELEASE ORAL DAILY
Qty: 90 CAPSULE | Refills: 1 | Status: SHIPPED | OUTPATIENT
Start: 2024-09-24

## 2024-09-24 RX ORDER — OLMESARTAN MEDOXOMIL 40 MG/1
40 TABLET ORAL DAILY
Qty: 90 TABLET | Refills: 1 | Status: SHIPPED | OUTPATIENT
Start: 2024-09-24

## 2024-09-24 RX ORDER — ALPRAZOLAM 1 MG
1 TABLET ORAL DAILY
Qty: 120 TABLET | Refills: 5 | Status: SHIPPED | OUTPATIENT
Start: 2024-09-24

## 2024-09-24 RX ORDER — DEXTROAMPHETAMINE SACCHARATE, AMPHETAMINE ASPARTATE, DEXTROAMPHETAMINE SULFATE AND AMPHETAMINE SULFATE 2.5; 2.5; 2.5; 2.5 MG/1; MG/1; MG/1; MG/1
10 TABLET ORAL
Qty: 60 TABLET | Refills: 0 | Status: SHIPPED | OUTPATIENT
Start: 2024-09-24

## 2024-09-24 NOTE — PROGRESS NOTES
Assessment/Plan: Previous laboratory studies reviewed.  Patient go for the laboratory studies as directed.  Patient will continue with current regimen for chronic conditions listed below.  Refills given at this time.  Patient will follow-up in 6 months or as needed.       Diagnoses and all orders for this visit:    Anxiety  -     ALPRAZolam (XANAX) 1 mg tablet; Take 1 tablet (1 mg total) by mouth daily take 1 tablet by mouth four times a day for anxiety    Benign essential hypertension  -     olmesartan (BENICAR) 40 mg tablet; Take 1 tablet (40 mg total) by mouth daily    Acute superficial gastritis without hemorrhage  -     omeprazole (PriLOSEC) 40 MG capsule; Take 1 capsule (40 mg total) by mouth daily    Attention deficit hyperactivity disorder (ADHD), combined type  -     amphetamine-dextroamphetamine (ADDERALL, 10MG,) 10 mg tablet; Take 1 tablet (10 mg total) by mouth 2 (two) times a day Max Daily Amount: 20 mg            Subjective:        Patient ID: Martell Mejía is a 49 y.o. male.      Patient is here to follow-up on anxiety, hypertension, gastritis, ADHD.  Patient feels in normal state of health overall.  Patient has difficulty with working night shift.  No new complaints being offered by patient at this time.          The following portions of the patient's history were reviewed and updated as appropriate: allergies, current medications, past family history, past medical history, past social history, past surgical history and problem list.      Review of Systems   Constitutional: Negative.    HENT: Negative.     Eyes: Negative.    Respiratory: Negative.     Cardiovascular: Negative.    Gastrointestinal: Negative.    Endocrine: Negative.    Genitourinary: Negative.    Musculoskeletal: Negative.    Skin: Negative.    Allergic/Immunologic: Negative.    Neurological: Negative.    Hematological: Negative.    Psychiatric/Behavioral: Negative.             Objective:               /74 (BP Location: Left  "arm, Patient Position: Sitting, Cuff Size: Large)   Pulse 58   Temp (!) 97.3 °F (36.3 °C) (Temporal)   Ht 5' 10\" (1.778 m)   Wt 99.9 kg (220 lb 3.2 oz)   SpO2 97%   BMI 31.60 kg/m²          Physical Exam  Vitals and nursing note reviewed.   Constitutional:       General: He is not in acute distress.     Appearance: Normal appearance. He is not ill-appearing, toxic-appearing or diaphoretic.   HENT:      Head: Normocephalic and atraumatic.      Right Ear: Tympanic membrane, ear canal and external ear normal. There is no impacted cerumen.      Left Ear: Tympanic membrane, ear canal and external ear normal. There is no impacted cerumen.      Nose: Nose normal. No congestion or rhinorrhea.      Mouth/Throat:      Mouth: Mucous membranes are moist.      Pharynx: No oropharyngeal exudate or posterior oropharyngeal erythema.   Eyes:      General: No scleral icterus.        Right eye: No discharge.         Left eye: No discharge.   Neck:      Vascular: No carotid bruit.   Cardiovascular:      Rate and Rhythm: Normal rate and regular rhythm.      Pulses: Normal pulses.      Heart sounds: Normal heart sounds. No murmur heard.     No friction rub. No gallop.   Pulmonary:      Effort: Pulmonary effort is normal. No respiratory distress.      Breath sounds: Normal breath sounds. No stridor. No wheezing, rhonchi or rales.   Chest:      Chest wall: No tenderness.   Musculoskeletal:         General: No swelling, tenderness, deformity or signs of injury. Normal range of motion.      Cervical back: Normal range of motion and neck supple. No rigidity. No muscular tenderness.      Right lower leg: No edema.      Left lower leg: No edema.   Lymphadenopathy:      Cervical: No cervical adenopathy.   Skin:     General: Skin is warm and dry.      Capillary Refill: Capillary refill takes less than 2 seconds.      Coloration: Skin is not jaundiced.      Findings: No bruising, erythema, lesion or rash.   Neurological:      Mental Status: " He is alert and oriented to person, place, and time. Mental status is at baseline.      Cranial Nerves: No cranial nerve deficit.      Sensory: No sensory deficit.      Motor: No weakness.      Coordination: Coordination normal.      Gait: Gait normal.   Psychiatric:         Mood and Affect: Mood normal.         Behavior: Behavior normal.         Thought Content: Thought content normal.         Judgment: Judgment normal.

## 2024-10-15 DIAGNOSIS — J06.9 UPPER RESPIRATORY TRACT INFECTION, UNSPECIFIED TYPE: Primary | ICD-10-CM

## 2024-10-15 NOTE — TELEPHONE ENCOUNTER
REVIEW  [TextBox_4] : Pre transplant Hx: aortopulmonary window, eisenmenger's, chornic PE, acute on chronic hypoxic resp failure 2/2 acute cholecystitis s/p perc avery on  with 8.5fr drain, persistent fevers s/p brenda, caspo, flagyl, zosyn, stroke code  at 6am, CTH x 3 negative & CTA negative, worsening hypoxic resp failure and lactic acidosis, listed for heart/lung on , exemption for status 1e on    Surgery:  Heart and bilateral lung transplant  Recipient: CMV + , EBV + , toxo +  Donor:  CMV + EBV + , Toxo - , PHS risk Y/N ,  bld cx gram stain +GPC, Bld cx +strep, BAL Right + Haemophilus influenzae  Induction: Prednisone 1g , no cellcept   Cold Ischemic Time: 323 mins Extubated: never attempted extubation, trached on , decannulated on  72 hr AB.43/42/105/28/3.3/29 on 21% fio2 Index discharge date: 2022  Hospital Course: complicated by prolonged intubation, afib RVR s/p amio bolus x 2, EMB #1 on  0R,  open trach 7.0fr portex, multiple bronchoscopies for thick copious secretions, +candida and IV caspafungin started . Mainted on inhaled amphotericin nebulizers and cresemba for fungal anastomosis infxn. Trach collar > 48 hours, Passed FEES and tolerated diet on 8/15, NG removed on , TBBx  A2R, decannulated on , pre renal BREEZY peak Cr 1.9 , improved with volume, transitioned to bactrim PO, rpt TBBx on  0AR negative immunofluorescent stains.   Readmit -: hyperkalmeis, hypoglycemia, supratherapeutic tacro.  K back no normal, no hypoglycemic events, metformin held, discharged on tacro 2mg BID, cellcept increased to 1g BID, prednisone decreased to 15mg daily,   Readmit: -: outpatient H/H 7., admit for repeat, repeat normal, ?seizure activity concern for PRES, CT head and MRI brain negative , MRI with signs of sinusitis, per Dr. Vargas no treatment indicated as patient is asymptomatic, ECHO  Readmit 10/5: Elective cholecystectomy, decreased prednisone to 12.5mg, decreased tacro to 1mg BID, cellcept o 500mg BID, s/p laparoscopy cholecystectomy on 10/6 with Dr. Dagher.  Bradycardia seen by EPS held flecainaide/lopressor (MCOT on d/c home). Patient with episode of nausea and vomiting after medications, worsening diarrhea/nausea, Leukopenia, cellcept dereased, positive for c.diff and campylobacter s/p vancomycin PO and azithromcyin, cellcept on hold, diarrhea abd pain resolved, d/c home on 10/14 with close follow up.  Readmit 10/28 - 10/31: A2 rejection, s/p 500mg IV solumedrol x 3 doses, return to prednisone 20mg daily until repeat bronchoscopy in 4 weeks.   Readmit  - 2024 : Presented to [facility] with cough, congestion and fevers. Code sepsis initiated received Vancomycin/ zosyn/ fluid resuscitation. CT Chest: Multifocal pneumonia, most severe in the right middle lobe. Transferred to [facility]- under care of Dr. Carpenter for suspected primary or superimporsed pneumonia. : Bronch/BAL yellow tinge secretions, completed Zosyn for 7-days total, (+) RSV, started on Ribavirin. Off Cellcept, cont Tacro with goal 8-10 ng/mL and on prednisone 7.5 mg QD  Clinic 2024: Patient presents for routine follow up appointment. States that she fell on  during an Easter egg hunt. She bent over to pick an egg up and felt dizzy and fell. She says everything went black and then she fell. She has also been feeling pain and slight weakness on the left side of her body for the last two days. No respiratory complaints, recent illnesses, or hospitalizations.  CLINIC 7/10/2024: Patient presents for routine follow up appointment. Reports feeling well overall. Denies shortness of breath, activity intolerance, recent illnesses, or hospitalizations. Recently flew to [place] to visit her sister. Offers no other complaints.  CLINIC 2024: Patient presents for routine follow up appointment after recent bronchoscopy. Reports doing well, denies SOB/HINOJOSA or respiratory symptoms. Bronch on 24 revealed: 10-49k haemophilus influenzae (treated w/ levaquin X5 days) PFTs lateral from last, no changes to medication regimen, dental procedure to be scheduled prophylaxis antibx ordered.  Patient complained of the inability to lose weight with diet and exercise. Given hypothyroid history, thyroid panel will be added to Monday's labs.   CLINIC 2024: Patient presents for routine follow up appointment. Reports doing well, offers no complaints. PFT stable, denies SOB/HINOJOSA or respiratory symptoms. Patient is still concern about the inability to lose weight, thyroid panel sent last month resulted WDL, will refer to endo for weight management. Patient's  to schedule COVID vaccine this fall. Monthly routine labs to be done on 24.    DATA REVIEWED: PFTS kajal Hummel  1974 Date FVC FEV1 DLCO TLC 2022 1.63, 54% 1.26, 51% 14.4, 65% 3.2, 68% 2022 1.69, 56% 1.51, 62% 14.7, 66% 3.28, 69% 2022 1.84, 61% 1.61, 66% 15.2, 68% 3.3, 70% 2022 1.75, 58% 1.62, 66% 14.32, 64% 3.23, 67% 9/15/2022 2.08, 69% 1.86, 76% 13.0, 58% 3.50, 74% 2022 1.75, 58% 1.49, 61% 11.5, 52% 3.09, 65% 2022 2.05, 63% 1.75, 67% 12.7, 57% 3.29, 68% 2022 2.41, 74% 1.99, 76% 13.2, 60% 3.38, 69% told stephanie lares to send this week- did not do 2022 2.28, 76% 1.95, 80% 2022 2.33, 72 2.07, 79% 13.3 3.37 11-Dec home 2.1, 70.1% 1.86, 75.97% 2- home 2.00, 67% 1.79 2023 [facility] 2.55, 78.4% 1.98, 74.9% 2023 home patricia 2.16, 72% 1.89, 77% 2023 home patricia 2.12, 70.82% 1.69, 69.1% 2023 home patricia 2.24, 74.85% 1.85, 75.66% 82.6, 100.82% 3/8/2023 clinic pfts 2.87, 89% 1.97, 75% 82% 2023 clinic PFTs 2.42, 75% 1.94, 74% 12.5, 57% 3.95, 81% 2023 410 clinic 2.23 69% 1.58 60% 71% 2023 410 clinic  2.26, 81% 1.79, 69%   2023 410 clinic  2.41, 74% 1.78, 68% 12.7, 57% 3.58, 74% 2023 410 clinic  2.5, 77% 1.74, 66% 13.0, 59% 3.64, 75% 11/10/2023 410 clinic 2.61, 81% 1.82, 70% 13.3, 60% 3.78, 78% 2023 410 clinic 2.55, 79% 1.83, 70% 11.9, 61% 3.41, 74% 1/10/2024 410 clinic: 2.50, 78%     1.68, 65%     67%     12.2, 56%     4.02, 83% 2024 410 clinic: 2.31, 72%   1.78, 68%   77%   12.4, 56%   3.87, 79% 2024 410 clinic: 2.48, 77%   1.81, 70%   73%   12.9, 59%   3.39, 70% 2024 410 clinic:  FVC 2.56, 79%  FEV1  1.81, 70%    DLCO 9.90, 50%   TLC 3.68, 78% 2024 [facility]:      FVC 2.31, 74%     FEV1: 1.58, 62%   DLCO 12.9, 60%     TLC: 3.40, 78% 2024     [facility] Clinic: FVC 2.47, 77%     FEV1 1.71, 66%    DLCO 13.4, 61%     TLC 3.59, 74% 7/10/2024     [facility] Clinic: FVC 2.52, 78%     FEV1 1.85, 71%    DLCO10.21, 52%    TLC 3.63, 79% 2024     [facility]: FVC 2.42, 75%   FEV1 1.78, 69%    DLCO 11.8, 54%     TLC 3.65, 75% 2024     [facility]:  FVC 2.28, 71%   FEV1 1.81, 70%     DLCO 12.5, 57%     TLC 3.92, 80%   EMBx #1: Week 1 22: 0R 1 year: 23: 0R  TBBX: 1 MONTH:  (LLL): A2 s/p 250mg IV medrol with 60mg 10 day taper ( - ) 6 week POST REJ:  (LLL): follow up bx s/p steriods, A0B0 3 MONTH: 10/27: A2B0, solumedrol 500mg x3 : F/U Bx from A2R (RLL & cryo)- A0B0, C4D NEGATIVE 6 MONTH: 23: A0B0 23 bronch with cryo and laser for granulation tissue at anastamotic site 9 Month- sent early on 3/14: BRONCH AT [facility] FOR CRYO AND LASER, BAL and Biopsy: A0B0, C4D negative 12 MONTH - 2023 (LLL): A0B0, C4d & GMS negative 2024: NGTD BAL, +RSV, s/p Ribavirin course and Zosyn 24 MONTH: 2024 A0B0 Tbbx: PRN  ALLOSURE: Month 1:  missed Month 3: 10/27: 0.32% Month 6: 2023 0.22% AND 0.27% Month 9: skip per guillermina Month 12: not done prior to bx Month 15: 10/26/2023- 0.37% Month 18: 2024 0.48% Month 21: 2024: 0.47% Month 24: 7/10/2024 0.17% PRN  DSA: Month 1:  no DSA 0% Month 3 10/6: no DSA 0% Month 6: 23: no DSA 0% Month 9: 2023 no DSA 0% Month 12: SENT by heart team on 23: 0% Month 15: 10/26/2023- 0% Month 18: 2024 0% Month 21: 2024 0% Month 24: 7/10/2024 0% PRN  CULTURES: 23: + GP cocci, final culture NGTD 23: BAL +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 23: 10-49k strep mitis (pending eval by transplant ID clinically doing well). 2023: Blood Cultures NGTD 2024: NGTD BAL, +RSV

## 2024-10-16 RX ORDER — ALBUTEROL SULFATE 90 UG/1
2 INHALANT RESPIRATORY (INHALATION) 4 TIMES DAILY
Qty: 1 G | Refills: 0 | Status: SHIPPED | OUTPATIENT
Start: 2024-10-16

## 2024-12-18 ENCOUNTER — APPOINTMENT (EMERGENCY)
Dept: RADIOLOGY | Facility: HOSPITAL | Age: 49
DRG: 291 | End: 2024-12-18
Payer: COMMERCIAL

## 2024-12-18 ENCOUNTER — HOSPITAL ENCOUNTER (INPATIENT)
Facility: HOSPITAL | Age: 49
LOS: 7 days | Discharge: HOME | DRG: 291 | End: 2024-12-25
Attending: EMERGENCY MEDICINE | Admitting: INTERNAL MEDICINE
Payer: COMMERCIAL

## 2024-12-18 DIAGNOSIS — E87.1 HYPONATREMIA: ICD-10-CM

## 2024-12-18 DIAGNOSIS — E83.39 HYPERPHOSPHATEMIA: ICD-10-CM

## 2024-12-18 DIAGNOSIS — N17.9 AKI (ACUTE KIDNEY INJURY) (HCC): ICD-10-CM

## 2024-12-18 DIAGNOSIS — R79.89 ELEVATED D-DIMER: ICD-10-CM

## 2024-12-18 DIAGNOSIS — I50.9 CHF (CONGESTIVE HEART FAILURE) (HCC): Primary | ICD-10-CM

## 2024-12-18 PROBLEM — R06.09 DYSPNEA ON EXERTION: Status: ACTIVE | Noted: 2024-12-18

## 2024-12-18 PROBLEM — R74.01 TRANSAMINITIS: Status: ACTIVE | Noted: 2024-12-18

## 2024-12-18 PROBLEM — I50.22 CHRONIC SYSTOLIC HEART FAILURE (HCC): Status: ACTIVE | Noted: 2024-12-18

## 2024-12-18 LAB
2HR DELTA HS TROPONIN: 0 NG/L
4HR DELTA HS TROPONIN: 1 NG/L
ALBUMIN SERPL BCG-MCNC: 3.7 G/DL (ref 3.5–5)
ALP SERPL-CCNC: 132 U/L (ref 34–104)
ALT SERPL W P-5'-P-CCNC: 87 U/L (ref 7–52)
ANION GAP SERPL CALCULATED.3IONS-SCNC: 12 MMOL/L (ref 4–13)
AST SERPL W P-5'-P-CCNC: 63 U/L (ref 13–39)
ATRIAL RATE: 101 BPM
ATRIAL RATE: 105 BPM
ATRIAL RATE: 93 BPM
BASOPHILS # BLD AUTO: 0.04 THOUSANDS/ÂΜL (ref 0–0.1)
BASOPHILS NFR BLD AUTO: 0 % (ref 0–1)
BILIRUB SERPL-MCNC: 1.05 MG/DL (ref 0.2–1)
BNP SERPL-MCNC: 1481 PG/ML (ref 0–100)
BUN SERPL-MCNC: 29 MG/DL (ref 5–25)
CALCIUM SERPL-MCNC: 8.9 MG/DL (ref 8.4–10.2)
CARDIAC TROPONIN I PNL SERPL HS: 35 NG/L (ref ?–50)
CARDIAC TROPONIN I PNL SERPL HS: 35 NG/L (ref ?–50)
CARDIAC TROPONIN I PNL SERPL HS: 36 NG/L (ref ?–50)
CHLORIDE SERPL-SCNC: 93 MMOL/L (ref 96–108)
CO2 SERPL-SCNC: 21 MMOL/L (ref 21–32)
CREAT SERPL-MCNC: 1.67 MG/DL (ref 0.6–1.3)
D DIMER PPP FEU-MCNC: 1.13 UG/ML FEU
EOSINOPHIL # BLD AUTO: 0.09 THOUSAND/ÂΜL (ref 0–0.61)
EOSINOPHIL NFR BLD AUTO: 1 % (ref 0–6)
ERYTHROCYTE [DISTWIDTH] IN BLOOD BY AUTOMATED COUNT: 14.2 % (ref 11.6–15.1)
GFR SERPL CREATININE-BSD FRML MDRD: 47 ML/MIN/1.73SQ M
GLUCOSE SERPL-MCNC: 122 MG/DL (ref 65–140)
HCT VFR BLD AUTO: 48.9 % (ref 36.5–49.3)
HGB BLD-MCNC: 15.7 G/DL (ref 12–17)
IMM GRANULOCYTES # BLD AUTO: 0.05 THOUSAND/UL (ref 0–0.2)
IMM GRANULOCYTES NFR BLD AUTO: 1 % (ref 0–2)
LYMPHOCYTES # BLD AUTO: 2.27 THOUSANDS/ÂΜL (ref 0.6–4.47)
LYMPHOCYTES NFR BLD AUTO: 21 % (ref 14–44)
MAGNESIUM SERPL-MCNC: 2.5 MG/DL (ref 1.9–2.7)
MCH RBC QN AUTO: 30.4 PG (ref 26.8–34.3)
MCHC RBC AUTO-ENTMCNC: 32.1 G/DL (ref 31.4–37.4)
MCV RBC AUTO: 95 FL (ref 82–98)
MONOCYTES # BLD AUTO: 0.66 THOUSAND/ÂΜL (ref 0.17–1.22)
MONOCYTES NFR BLD AUTO: 6 % (ref 4–12)
NEUTROPHILS # BLD AUTO: 7.86 THOUSANDS/ÂΜL (ref 1.85–7.62)
NEUTS SEG NFR BLD AUTO: 71 % (ref 43–75)
NRBC BLD AUTO-RTO: 0 /100 WBCS
P AXIS: 71 DEGREES
P AXIS: 74 DEGREES
P AXIS: 77 DEGREES
PHOSPHATE SERPL-MCNC: 4.7 MG/DL (ref 2.7–4.5)
PLATELET # BLD AUTO: 290 THOUSANDS/UL (ref 149–390)
PMV BLD AUTO: 11.1 FL (ref 8.9–12.7)
POTASSIUM SERPL-SCNC: 5.3 MMOL/L (ref 3.5–5.3)
PR INTERVAL: 192 MS
PR INTERVAL: 210 MS
PR INTERVAL: 214 MS
PROT SERPL-MCNC: 6 G/DL (ref 6.4–8.4)
QRS AXIS: 177 DEGREES
QRS AXIS: 189 DEGREES
QRS AXIS: 193 DEGREES
QRSD INTERVAL: 110 MS
QRSD INTERVAL: 112 MS
QRSD INTERVAL: 114 MS
QT INTERVAL: 324 MS
QT INTERVAL: 332 MS
QT INTERVAL: 336 MS
QTC INTERVAL: 402 MS
QTC INTERVAL: 435 MS
QTC INTERVAL: 438 MS
RBC # BLD AUTO: 5.17 MILLION/UL (ref 3.88–5.62)
SODIUM SERPL-SCNC: 126 MMOL/L (ref 135–147)
T WAVE AXIS: 77 DEGREES
T WAVE AXIS: 79 DEGREES
T WAVE AXIS: 81 DEGREES
VENTRICULAR RATE: 101 BPM
VENTRICULAR RATE: 105 BPM
VENTRICULAR RATE: 93 BPM
WBC # BLD AUTO: 10.97 THOUSAND/UL (ref 4.31–10.16)

## 2024-12-18 PROCEDURE — 99223 1ST HOSP IP/OBS HIGH 75: CPT | Performed by: STUDENT IN AN ORGANIZED HEALTH CARE EDUCATION/TRAINING PROGRAM

## 2024-12-18 PROCEDURE — 84484 ASSAY OF TROPONIN QUANT: CPT | Performed by: EMERGENCY MEDICINE

## 2024-12-18 PROCEDURE — 93005 ELECTROCARDIOGRAM TRACING: CPT

## 2024-12-18 PROCEDURE — 71045 X-RAY EXAM CHEST 1 VIEW: CPT

## 2024-12-18 PROCEDURE — 85379 FIBRIN DEGRADATION QUANT: CPT | Performed by: EMERGENCY MEDICINE

## 2024-12-18 PROCEDURE — 84100 ASSAY OF PHOSPHORUS: CPT

## 2024-12-18 PROCEDURE — 36415 COLL VENOUS BLD VENIPUNCTURE: CPT | Performed by: EMERGENCY MEDICINE

## 2024-12-18 PROCEDURE — 83880 ASSAY OF NATRIURETIC PEPTIDE: CPT | Performed by: EMERGENCY MEDICINE

## 2024-12-18 PROCEDURE — 83735 ASSAY OF MAGNESIUM: CPT

## 2024-12-18 PROCEDURE — 99285 EMERGENCY DEPT VISIT HI MDM: CPT | Performed by: EMERGENCY MEDICINE

## 2024-12-18 PROCEDURE — 93010 ELECTROCARDIOGRAM REPORT: CPT | Performed by: STUDENT IN AN ORGANIZED HEALTH CARE EDUCATION/TRAINING PROGRAM

## 2024-12-18 PROCEDURE — 80053 COMPREHEN METABOLIC PANEL: CPT | Performed by: EMERGENCY MEDICINE

## 2024-12-18 PROCEDURE — 99223 1ST HOSP IP/OBS HIGH 75: CPT | Performed by: INTERNAL MEDICINE

## 2024-12-18 PROCEDURE — 85025 COMPLETE CBC W/AUTO DIFF WBC: CPT | Performed by: EMERGENCY MEDICINE

## 2024-12-18 PROCEDURE — 99285 EMERGENCY DEPT VISIT HI MDM: CPT

## 2024-12-18 PROCEDURE — 71275 CT ANGIOGRAPHY CHEST: CPT

## 2024-12-18 RX ORDER — HEPARIN SODIUM 5000 [USP'U]/ML
5000 INJECTION, SOLUTION INTRAVENOUS; SUBCUTANEOUS EVERY 8 HOURS SCHEDULED
Status: DISCONTINUED | OUTPATIENT
Start: 2024-12-18 | End: 2024-12-25 | Stop reason: HOSPADM

## 2024-12-18 RX ORDER — ROSUVASTATIN CALCIUM 5 MG/1
5 TABLET, COATED ORAL DAILY
COMMUNITY

## 2024-12-18 RX ORDER — METOPROLOL SUCCINATE 25 MG/1
25 TABLET, EXTENDED RELEASE ORAL DAILY
COMMUNITY
End: 2024-12-25

## 2024-12-18 RX ORDER — SODIUM CHLORIDE, SODIUM GLUCONATE, SODIUM ACETATE, POTASSIUM CHLORIDE, MAGNESIUM CHLORIDE, SODIUM PHOSPHATE, DIBASIC, AND POTASSIUM PHOSPHATE .53; .5; .37; .037; .03; .012; .00082 G/100ML; G/100ML; G/100ML; G/100ML; G/100ML; G/100ML; G/100ML
50 INJECTION, SOLUTION INTRAVENOUS CONTINUOUS
Status: DISCONTINUED | OUTPATIENT
Start: 2024-12-18 | End: 2024-12-18

## 2024-12-18 RX ORDER — SODIUM CHLORIDE 9 MG/ML
3 INJECTION INTRAVENOUS
Status: DISCONTINUED | OUTPATIENT
Start: 2024-12-18 | End: 2024-12-25 | Stop reason: HOSPADM

## 2024-12-18 RX ORDER — ALPRAZOLAM 0.5 MG
1 TABLET ORAL 3 TIMES DAILY PRN
Status: DISCONTINUED | OUTPATIENT
Start: 2024-12-18 | End: 2024-12-25 | Stop reason: HOSPADM

## 2024-12-18 RX ORDER — SPIRONOLACTONE 25 MG/1
12.5 TABLET ORAL DAILY
COMMUNITY
End: 2024-12-25

## 2024-12-18 RX ORDER — ALBUTEROL SULFATE 90 UG/1
2 INHALANT RESPIRATORY (INHALATION) 4 TIMES DAILY
Status: DISCONTINUED | OUTPATIENT
Start: 2024-12-18 | End: 2024-12-25 | Stop reason: HOSPADM

## 2024-12-18 RX ORDER — VALSARTAN 40 MG/1
40 TABLET ORAL DAILY
COMMUNITY
End: 2024-12-25

## 2024-12-18 RX ORDER — ASPIRIN 81 MG/1
81 TABLET, CHEWABLE ORAL DAILY
COMMUNITY

## 2024-12-18 RX ORDER — FUROSEMIDE 10 MG/ML
40 INJECTION INTRAMUSCULAR; INTRAVENOUS ONCE
Status: COMPLETED | OUTPATIENT
Start: 2024-12-18 | End: 2024-12-19

## 2024-12-18 RX ORDER — PRAVASTATIN SODIUM 40 MG
40 TABLET ORAL
Status: DISCONTINUED | OUTPATIENT
Start: 2024-12-18 | End: 2024-12-25 | Stop reason: HOSPADM

## 2024-12-18 RX ORDER — ASPIRIN 81 MG/1
81 TABLET, CHEWABLE ORAL DAILY
Status: DISCONTINUED | OUTPATIENT
Start: 2024-12-19 | End: 2024-12-25 | Stop reason: HOSPADM

## 2024-12-18 RX ORDER — FUROSEMIDE 20 MG/1
20 TABLET ORAL DAILY
Status: ON HOLD | COMMUNITY
End: 2024-12-25

## 2024-12-18 RX ORDER — METOPROLOL SUCCINATE 25 MG/1
25 TABLET, EXTENDED RELEASE ORAL DAILY
Status: DISCONTINUED | OUTPATIENT
Start: 2024-12-19 | End: 2024-12-21

## 2024-12-18 RX ORDER — PANTOPRAZOLE SODIUM 40 MG/1
40 TABLET, DELAYED RELEASE ORAL
Status: DISCONTINUED | OUTPATIENT
Start: 2024-12-19 | End: 2024-12-25 | Stop reason: HOSPADM

## 2024-12-18 RX ADMIN — HEPARIN SODIUM 5000 UNITS: 5000 INJECTION, SOLUTION INTRAVENOUS; SUBCUTANEOUS at 21:41

## 2024-12-18 RX ADMIN — ALBUTEROL SULFATE 2 PUFF: 90 AEROSOL, METERED RESPIRATORY (INHALATION) at 21:42

## 2024-12-18 RX ADMIN — ALBUTEROL SULFATE 2 PUFF: 90 AEROSOL, METERED RESPIRATORY (INHALATION) at 19:42

## 2024-12-18 RX ADMIN — SODIUM CHLORIDE 250 ML: 0.9 INJECTION, SOLUTION INTRAVENOUS at 17:08

## 2024-12-18 RX ADMIN — PRAVASTATIN SODIUM 40 MG: 40 TABLET ORAL at 19:39

## 2024-12-18 RX ADMIN — IOHEXOL 85 ML: 350 INJECTION, SOLUTION INTRAVENOUS at 17:19

## 2024-12-18 RX ADMIN — SODIUM CHLORIDE, SODIUM GLUCONATE, SODIUM ACETATE, POTASSIUM CHLORIDE, MAGNESIUM CHLORIDE, SODIUM PHOSPHATE, DIBASIC, AND POTASSIUM PHOSPHATE 50 ML/HR: .53; .5; .37; .037; .03; .012; .00082 INJECTION, SOLUTION INTRAVENOUS at 20:31

## 2024-12-18 NOTE — ASSESSMENT & PLAN NOTE
Creatinine up until October was from 0.8-1.1, recently his creatinine is 1.4, today creatinine 1.67  Patient clinically appears euvolemic, he has AL with hyponatremia   Increasing creatinine could be medication induced and intravascular volume depletion, he continued Lasix 40 mg daily, he was started on valsartan a few days ago.  Had poor oral intake in the past few days  He is also on spironolactone and Jardiance  He received a bolus of 250 cc in the ED and tolerated well  Will start Plasma-Lyte 50 cc/h for 500 cc total  Repeat labs tomorrow  Hold Lasix, valsartan

## 2024-12-18 NOTE — H&P
H&P - Hospitalist   Name: Martell Mejía 49 y.o. male I MRN: [id number]  Unit/Bed#: ED 16 I Date of Admission: 12/18/2024   Date of Service: 12/18/2024 I Hospital Day: 0     Assessment & Plan  Chronic systolic heart failure (HCC)  Wt Readings from Last 3 Encounters:   12/18/24 98.9 kg (218 lb 0.6 oz)   09/24/24 99.9 kg (220 lb 3.2 oz)   02/27/24 103 kg (227 lb 3.2 oz)     In October patient presented to the ED with shortness of breath, found to have new onset heart failure  Echo showed ejection fraction of 20%  Stress test showed large size, severe intensity infarct in the RCA/LCx territory   Did not have cardiac catheterization  Patient presents with shortness of breath today  Chest x-ray showed no vascular congestion  BNP is elevated at 1481, has AL and mild transaminitis.  Troponin x 2 negative, EKG without acute ischemic changes, has PVCs  On physical exam patient has minimal lower extremity swelling (which significantly improved), do not appreciate JVD, lungs are clear to auscultation.  Patient states his baseline weight is around 220 pounds, today his weight is 218 pounds, patient says he lost significant weight recently  Clinically he appears euvolemic  Follows [facility] cardiology, would like to switch care to [facility]  In the past month medications were changed due to side effects, new medication introduced for GDMT  12/5/24 Lasix was decreased from 40 mg to 20 mg by cardiology but patient continued to take 40 mg daily, he was afraid he will develop lower extremity swelling  He was on Entresto until last week, due to cough it was switched to valsartan 40 mg daily  He is on spironolactone 12.5 mg daily, Jardiance 10 mg daily, metoprolol succinate 25 mg daily  Received a LifeVest on Friday  We will consult cardiology to help with medication management        Dyspnea on exertion  Seems to be ongoing since October  Most likely related to ischemic cardiomyopathy  D-dimer was 1.1, CT PE was done and results  are pending  Acute kidney injury (HCC)  Creatinine up until October was from 0.8-1.1, recently his creatinine is 1.4, today creatinine 1.67  Patient clinically appears euvolemic, he has AL with hyponatremia   Increasing creatinine could be medication induced and intravascular volume depletion, he continued Lasix 40 mg daily, he was started on valsartan a few days ago.  Had poor oral intake in the past few days  He is also on spironolactone and Jardiance  He received a bolus of 250 cc in the ED and tolerated well  Will start Plasma-Lyte 50 cc/h for 500 cc total  Repeat labs tomorrow  Hold Lasix, valsartan  Hyponatremia  Could be from intravascular volume depletion, he had poor oral intake for the past few days and he continued to take Lasix 40 mg daily  Will give gentle hydration overnight  Recheck lab tomorrow  Asymptomatic  Transaminitis  Mild transaminitis, no abdominal pain or tenderness  Clinically euvolemic  Will give gentle hydration  Recheck labs tomorrow  Anxiety  Continue Xanax 1 mg 3 times daily as needed  PDMP reviewed  Benign essential hypertension  Continue metoprolol succinate 25 mg daily  Hold Lasix, spironolactone and valsartan due to AL  Hypercholesterolemia  On Crestor 5 mg daily, substitute for pravastatin 40 mg daily  Gastroesophageal reflux disease with esophagitis  Continue pantoprazole 40 mg daily      VTE Pharmacologic Prophylaxis: VTE Score: 3 Moderate Risk (Score 3-4) - Pharmacological DVT Prophylaxis Ordered: heparin.  Code Status: Level 1 - Full Code   Discussion with family:  patient.     Anticipated Length of Stay: Patient will be admitted on an inpatient basis with an anticipated length of stay of greater than 2 midnights secondary to shortness of breath.    History of Present Illness   Chief Complaint: Shortness of breath    Martell Mejía is a 49 y.o. male with a PMH of hypertension, hyperlipidemia, systolic heart failure who presents with shortness of breath.  In October patient  presented to the ED with shortness of breath, found to have new onset systolic heart failure with ejection fraction of 20%, nuclear stress test showed large sized severe intensity infarct in the RCA/LCx territory.  Follows [facility] cardiology.  He was seen 2024.  His regimen was changed in the past month to control volume status, for GDMT and medication side effect.  On last cardiology appointment 2024, his Lasix was decreased from 40 mg to 20 mg daily, he was started on Entresto and Jardiance.  He is also on spironolactone and metoprolol.  On Entresto he developed a cough last week it was discontinued and he was started on valsartan.  Patient continued to take Lasix 40 mg daily instead of 20 mg daily.  He had poor oral intake in the past few days.  He lost significant weight recently.    Review of Systems   Constitutional:  Positive for activity change and appetite change.   Respiratory:  Positive for shortness of breath.    Cardiovascular:  Negative for chest pain, palpitations and leg swelling.   Gastrointestinal:  Negative for abdominal pain, diarrhea, nausea and vomiting.   Genitourinary:  Negative for dysuria.   Musculoskeletal: Negative.    Neurological: Negative.    Hematological: Negative.        Historical Information   Past Medical History:   Diagnosis Date    Allergic     Anxiety     Depression     GERD (gastroesophageal reflux disease)     Hyperlipidemia     Hypertension      Past Surgical History:   Procedure Laterality Date    NO PAST SURGERIES       Social History     Tobacco Use    Smoking status: Former     Current packs/day: 0.00     Types: Cigarettes     Quit date: 2011     Years since quittin.4    Smokeless tobacco: Never   Vaping Use    Vaping status: Never Used   Substance and Sexual Activity    Alcohol use: Yes     Comment: seldom    Drug use: Never    Sexual activity: Yes     Partners: Female     E-Cigarette/Vaping    E-Cigarette Use Never User       E-Cigarette/Vaping Substances    Nicotine No     THC No     CBD No     Flavoring No     Other No     Unknown No      Family history non-contributory  Social History:  Marital Status: /Civil Union     Patient Pre-hospital Living   Meds/Allergies   I have reviewed home medications with patient personally.  Prior to Admission medications    Medication Sig Start Date End Date Taking? Authorizing Provider   aspirin 81 mg chewable tablet Chew 81 mg daily   Yes Historical Provider, MD   Empagliflozin (JARDIANCE) 10 MG TABS tablet Take 10 mg by mouth every morning   Yes Historical Provider, MD   furosemide (LASIX) 20 mg tablet Take 20 mg by mouth daily   Yes Historical Provider, MD   metoprolol succinate (TOPROL-XL) 25 mg 24 hr tablet Take 25 mg by mouth daily   Yes Historical Provider, MD   rosuvastatin (CRESTOR) 5 mg tablet Take 5 mg by mouth daily   Yes Historical Provider, MD   spironolactone (ALDACTONE) 25 mg tablet Take 12.5 mg by mouth daily   Yes Historical Provider, MD   valsartan (DIOVAN) 40 mg tablet Take 40 mg by mouth daily   Yes Historical Provider, MD   albuterol (Ventolin HFA) 90 mcg/act inhaler Inhale 2 puffs 4 (four) times a day 10/16/24   Radames Grajeda,    ALPRAZolam (XANAX) 1 mg tablet Take 1 tablet (1 mg total) by mouth daily take 1 tablet by mouth four times a day for anxiety 9/24/24   Radames Grajeda DO   amphetamine-dextroamphetamine (ADDERALL, 10MG,) 10 mg tablet Take 1 tablet (10 mg total) by mouth 2 (two) times a day Max Daily Amount: 20 mg 9/24/24   Radames Grajeda DO   mupirocin (BACTROBAN) 2 % ointment Apply topically 3 (three) times a day  Patient not taking: Reported on 2/27/2024 8/9/23   Aman Willis, DO   omeprazole (PriLOSEC) 40 MG capsule Take 1 capsule (40 mg total) by mouth daily 9/24/24   Radames Grajeda DO   olmesartan (BENICAR) 40 mg tablet Take 1 tablet (40 mg total) by mouth daily  Patient not taking: Reported on 12/18/2024 9/24/24 12/18/24  Radames Grajeda DO     No Known  "Allergies    Objective :  Temp:  [97.1 °F (36.2 °C)] 97.1 °F (36.2 °C)  HR:  [] 96  BP: ()/(61-78) 94/61  Resp:  [20-26] 20  SpO2:  [96 %-98 %] 97 %  O2 Device: None (Room air)    Physical Exam  Constitutional:       General: He is not in acute distress.  HENT:      Head: Atraumatic.   Cardiovascular:      Rate and Rhythm: Normal rate and regular rhythm.      Heart sounds: No murmur heard.  Pulmonary:      Effort: Pulmonary effort is normal. No respiratory distress.      Breath sounds: Normal breath sounds. No wheezing.   Abdominal:      General: Bowel sounds are normal. There is no distension.      Palpations: Abdomen is soft.      Tenderness: There is no abdominal tenderness.   Musculoskeletal:         General: No swelling.      Cervical back: Neck supple.   Skin:     General: Skin is warm and dry.   Neurological:      General: No focal deficit present.      Mental Status: He is alert.   Psychiatric:         Mood and Affect: Mood normal.          Lines/Drains:            Lab Results: I have reviewed the following results:  Results from last 7 days   Lab Units 12/18/24  1410   WBC Thousand/uL 10.97*   HEMOGLOBIN g/dL 15.7   HEMATOCRIT % 48.9   PLATELETS Thousands/uL 290   SEGS PCT % 71   LYMPHO PCT % 21   MONO PCT % 6   EOS PCT % 1     Results from last 7 days   Lab Units 12/18/24  1410   SODIUM mmol/L 126*   POTASSIUM mmol/L 5.3   CHLORIDE mmol/L 93*   CO2 mmol/L 21   BUN mg/dL 29*   CREATININE mg/dL 1.67*   ANION GAP mmol/L 12   CALCIUM mg/dL 8.9   ALBUMIN g/dL 3.7   TOTAL BILIRUBIN mg/dL 1.05*   ALK PHOS U/L 132*   ALT U/L 87*   AST U/L 63*   GLUCOSE RANDOM mg/dL 122             No results found for: \"HGBA1C\"        Imaging Results Review: I reviewed radiology reports from this admission including: chest xray.  Other Study Results Review: EKG was reviewed.     Administrative Statements       ** Please Note: This note has been constructed using a voice recognition system. **    "

## 2024-12-18 NOTE — ASSESSMENT & PLAN NOTE
Could be from intravascular volume depletion, he had poor oral intake for the past few days and he continued to take Lasix 40 mg daily  Will give gentle hydration overnight  Recheck lab tomorrow  Asymptomatic

## 2024-12-18 NOTE — ASSESSMENT & PLAN NOTE
Mild transaminitis, no abdominal pain or tenderness  Clinically euvolemic  Will give gentle hydration  Recheck labs tomorrow

## 2024-12-18 NOTE — ASSESSMENT & PLAN NOTE
Wt Readings from Last 3 Encounters:   12/18/24 98.9 kg (218 lb 0.6 oz)   09/24/24 99.9 kg (220 lb 3.2 oz)   02/27/24 103 kg (227 lb 3.2 oz)     In October patient presented to the ED with shortness of breath, found to have new onset heart failure  Echo showed ejection fraction of 20%  Stress test showed large size, severe intensity infarct in the RCA/LCx territory   Did not have cardiac catheterization  Patient presents with shortness of breath today  Chest x-ray showed no vascular congestion  BNP is elevated at 1481, has AL and mild transaminitis.  Troponin x 2 negative, EKG without acute ischemic changes, has PVCs  On physical exam patient has minimal lower extremity swelling (which significantly improved), do not appreciate JVD, lungs are clear to auscultation.  Patient states his baseline weight is around 220 pounds, today his weight is 218 pounds, patient says he lost significant weight recently  Clinically he appears euvolemic  Follows [facility] cardiology, would like to switch care to [facility]  In the past month medications were changed due to side effects, new medication introduced for GDMT  12/5/24 Lasix was decreased from 40 mg to 20 mg by cardiology but patient continued to take 40 mg daily, he was afraid he will develop lower extremity swelling  He was on Entresto until last week, due to cough it was switched to valsartan 40 mg daily  He is on spironolactone 12.5 mg daily, Jardiance 10 mg daily, metoprolol succinate 25 mg daily  Received a LifeVest on Friday  We will consult cardiology to help with medication management

## 2024-12-18 NOTE — ASSESSMENT & PLAN NOTE
Seems to be ongoing since October  Most likely related to ischemic cardiomyopathy  D-dimer was 1.1, CT PE was done and results are pending

## 2024-12-18 NOTE — ED PROVIDER NOTES
"  ED Disposition       None          Assessment & Plan   {Hyperlinks  Risk Stratification - NIHSS - HEART SCORE - Fill out sepsis note and make sure you call [phone] if severe or septic shock:[id number]}    Medical Decision Making  Patient is a 49 y.o. male with PMH of *** who presents to the ED with ***    Vital signs ***  On exam ***    History and physical exam most consistent with *** However, differential diagnosis included but not limited to *** Plan ***    View ED course above for further discussion on patient workup.     ***    All labs reviewed and utilized in the medical decision making process  All radiology studies independently viewed by me and interpreted by the radiologist.  I reviewed all testing with the patient.     Upon re-evaluation ***               Medications - No data to display    ED Risk Strat Scores                                              History of Present Illness   {Hyperlinks  History (Med, Surg, Fam, Social) - Current Medications - Allergies  :[id number]}    Chief Complaint   Patient presents with    Shortness of Breath     C/o recent heart attack on October potentially shown on recent EKG, recently took off entresto due to cough but started \"valsartan\" on Friday and causing SOB. Life vest patient.       Past Medical History:   Diagnosis Date    Allergic     Anxiety     Depression     GERD (gastroesophageal reflux disease)     Hyperlipidemia     Hypertension       Past Surgical History:   Procedure Laterality Date    NO PAST SURGERIES        Family History   Problem Relation Age of Onset    No Known Problems Mother     Diabetes Father       Social History     Tobacco Use    Smoking status: Former     Current packs/day: 0.00     Types: Cigarettes     Quit date: 2011     Years since quittin.4    Smokeless tobacco: Never   Vaping Use    Vaping status: Never Used   Substance Use Topics    Alcohol use: Yes     Comment: seldom    Drug use: Never      E-Cigarette/Vaping    " E-Cigarette Use Never User       E-Cigarette/Vaping Substances    Nicotine No     THC No     CBD No     Flavoring No     Other No     Unknown No       I have reviewed and agree with the history as documented.     HPI    Review of Systems        Objective   {Hyperlinks  Historical Vitals - Historical Labs - Chart Review/Microbiology - Last Echo - Code Status  :[id number]}    ED Triage Vitals   Temperature Pulse Blood Pressure Respirations SpO2 Patient Position - Orthostatic VS   12/18/24 1236 12/18/24 1236 12/18/24 1240 12/18/24 1236 12/18/24 1236 --   (!) 97.1 °F (36.2 °C) (!) 106 100/70 (!) 26 97 %       Temp Source Heart Rate Source BP Location FiO2 (%) Pain Score    12/18/24 1236 12/18/24 1236 12/18/24 1252 -- 12/18/24 1236    Temporal Monitor Left arm  2      Vitals      Date and Time Temp Pulse SpO2 Resp BP Pain Score FACES Pain Rating User   12/18/24 1300 -- 100 98 % -- 104/76 -- -- MH   12/18/24 1252 -- 98 98 % 20 105/78 -- -- JS   12/18/24 1240 -- -- -- -- 100/70 -- -- CS   12/18/24 1236 97.1 °F (36.2 °C) 106 97 % 26 -- 2 -- CS            Physical Exam    Results Reviewed       None            No orders to display       Procedures    ED Medication and Procedure Management   Prior to Admission Medications   Prescriptions Last Dose Informant Patient Reported? Taking?   ALPRAZolam (XANAX) 1 mg tablet   No No   Sig: Take 1 tablet (1 mg total) by mouth daily take 1 tablet by mouth four times a day for anxiety   albuterol (Ventolin HFA) 90 mcg/act inhaler   No No   Sig: Inhale 2 puffs 4 (four) times a day   amphetamine-dextroamphetamine (ADDERALL, 10MG,) 10 mg tablet   No No   Sig: Take 1 tablet (10 mg total) by mouth 2 (two) times a day Max Daily Amount: 20 mg   mupirocin (BACTROBAN) 2 % ointment   No No   Sig: Apply topically 3 (three) times a day   Patient not taking: Reported on 2/27/2024   olmesartan (BENICAR) 40 mg tablet   No No   Sig: Take 1 tablet (40 mg total) by mouth daily   omeprazole (PriLOSEC) 40  MG capsule   No No   Sig: Take 1 capsule (40 mg total) by mouth daily      Facility-Administered Medications: None     Patient's Medications   Discharge Prescriptions    No medications on file     No discharge procedures on file.  ED SEPSIS DOCUMENTATION          date: 2011     Years since quittin.5    Smokeless tobacco: Never   Vaping Use    Vaping status: Never Used   Substance Use Topics    Alcohol use: Yes     Comment: seldom    Drug use: Never      E-Cigarette/Vaping    E-Cigarette Use Never User       E-Cigarette/Vaping Substances    Nicotine No     THC No     CBD No     Flavoring No     Other No     Unknown No       I have reviewed and agree with the history as documented.     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, dime sized, at [employer] for lunch, could not breathe, was not sleeping last night, up in bed trying to breathe, legs are swelling, brought here for collection of symptoms, pt does not think he is retaining water relative to prior, visit from the  to check his weight, pt checking his weights, minimal flux, but since starting valsartan, decreased PO intake, lost 7 pounds, switched to valsartan on Friday last week, lasix + asa since October as well, pt states current range 15-20 feet, no home O2, no hx lung issues, prior to HF, meds sartan + benacar, xanax        Review of Systems        Objective       ED Triage Vitals   Temperature Pulse Blood Pressure Respirations SpO2 Patient Position - Orthostatic VS   24 1236 24 1236 24 1240 24 1236 24 1236 24 1106   (!) 97.1 °F (36.2 °C) (!) 106 100/70 (!) 26 97 % Sitting      Temp Source Heart Rate Source BP Location FiO2 (%) Pain Score    24 1236 24 1236 24 1252 -- 24 1236    Temporal Monitor Left arm  2      Vitals       Date and Time Temp Pulse SpO2 Resp BP Pain Score FACES Pain Rating User   12/25/24 1340 -- 83 98 % -- 104/65 -- -- DII   12/25/24 1100 -- -- -- 18 -- -- -- CC   12/25/24 1059 98.3 °F (36.8 °C) 94 97 % -- 100/69 -- -- DII   12/25/24 1031 -- -- -- -- -- No Pain -- NM   12/25/24 1024 -- -- -- -- 88/56 -- -- NM   12/25/24 0844 -- -- -- -- 90/54 -- -- NM   12/25/24 0708 -- -- -- 18 -- -- -- CC   12/25/24 0708 98 °F (36.7 °C) 83 97 % -- 90/63 -- -- DII   12/25/24 0708 98 °F (36.7 °C) 81 97 % -- 90/63 -- -- DII   12/25/24 0209 98.2 °F (36.8 °C) 81 97 % 20 93/57 -- -- DII   12/24/24 2304 -- -- -- 16 -- -- -- LR   12/24/24 2304 98.8 °F (37.1 °C) 94 95 % -- 95/59 -- -- DII   12/24/24 2304 98.8 °F (37.1 °C) 93 91 % -- 95/59 -- -- DII   12/24/24 2130 -- -- -- -- -- No Pain -- KS   12/24/24 1855 99.5 °F (37.5 °C) 98 99 % -- 108/73 -- -- DII   12/24/24 1855 99.5 °F (37.5 °C) 99 97 % -- 108/73 -- -- DII   12/24/24 1714 -- 94 99 % -- 103/73 -- -- DII   12/24/24 1713 -- 94 -- -- 103/73 -- -- NMM   12/24/24 1538 -- -- -- 18 -- -- -- LR   12/24/24 1538 97.9 °F (36.6 °C) 95 99 % -- 110/82 -- -- DII   12/24/24 1339 -- 95 100 % -- 110/67 -- -- DII   12/24/24 1058 97.5 °F (36.4 °C) 94 96 % -- 95/68 -- -- DII   12/24/24 1037 -- -- -- -- -- No Pain -- NMM   12/24/24 0903 -- 90 99 % -- 115/68 -- -- DII   12/24/24 0902 -- 90 -- -- 115/68 -- -- NMM   12/24/24 0717 -- -- -- 18 -- -- -- MF   12/24/24 0717 97.9 °F (36.6 °C) 85 97 % -- 92/62 -- -- DII   12/24/24 0715 97.9 °F (36.6 °C) 86 91 % -- 94/60 -- -- DII   12/24/24 0038 98.6 °F (37 °C) 79 96 % -- 97/61 -- -- DII   12/23/24 1925 98.5 °F (36.9 °C) -- -- 18 89/61 -- -- DII   12/23/24 1718 -- 95 97 % -- 113/81 -- -- DII   12/23/24 1507 97.4 °F (36.3 °C) -- -- -- 102/73 -- -- DII   12/23/24 1300 -- 96 -- -- -- -- -- DM   12/23/24 1115 -- 92 -- -- -- -- -- DM   12/23/24 1111 -- -- -- 22 -- -- -- DM   12/23/24 1111 97.5 °F (36.4 °C) 41 97 % -- 93/75 -- -- DII   12/23/24 0800 -- -- 98 % --  -- No Pain -- DM   12/23/24 0733 98.1 °F (36.7 °C) -- -- -- -- -- -- EV   12/23/24 0733 -- -- -- 20 -- -- -- DM   12/23/24 0733 -- -- 75 % -- 109/78 -- -- DII   12/23/24 0606 -- 100 100 % -- 112/79 -- -- DII   12/22/24 2346 -- 92 97 % -- 103/77 -- -- DII   12/22/24 1855 98.2 °F (36.8 °C) 96 93 % -- 120/79 -- -- DII   12/22/24 1508 -- 97 100 % -- 110/79 -- -- DII   12/22/24 1127 97.5 °F (36.4 °C) 98 97 % -- 104/76 -- -- DII   12/22/24 0910 -- -- -- -- -- No Pain -- CR   12/22/24 0908 -- 77 94 % -- 126/80 -- -- DII   12/22/24 0718 97.9 °F (36.6 °C) 72 98 % 18 93/52 -- -- DII   12/22/24 0236 98.4 °F (36.9 °C) 83 99 % -- 115/61 -- -- DII   12/21/24 2252 97.7 °F (36.5 °C) 64 99 % -- 98/70 -- -- DII   12/21/24 2144 -- 101 97 % -- 112/80 -- -- DII   12/21/24 2030 -- -- -- -- -- No Pain -- KF   12/21/24 1919 97.5 °F (36.4 °C) 96 97 % -- 94/73 -- -- DII   12/21/24 1535 97.4 °F (36.3 °C) 91 98 % -- 107/73 -- -- DII   12/21/24 1300 -- -- -- -- -- No Pain -- CR   12/21/24 1118 98.1 °F (36.7 °C) -- -- -- 105/76 -- -- DII   12/21/24 0953 -- 94 -- -- 119/63 -- -- LR   12/21/24 0734 97.3 °F (36.3 °C) 62 88 % -- 95/66 -- -- DII   12/21/24 0404 97.2 °F (36.2 °C) 74 94 % -- 114/83 -- -- DII   12/21/24 0035 97.4 °F (36.3 °C) 91 96 % -- 117/84 -- -- DII   12/20/24 2030 -- -- -- -- -- No Pain -- KF   12/20/24 1533 -- 80 88 % 17 103/78 -- -- DII   12/20/24 1533 97.7 °F (36.5 °C) -- -- -- -- -- -- PK   12/20/24 1533 -- -- -- 17 103/78 -- -- DII   12/20/24 1100 97.8 °F (36.6 °C) -- -- -- 119/81 -- -- AJ   12/20/24 0900 -- -- -- -- -- No Pain -- YL   12/20/24 0734 97.2 °F (36.2 °C) 98 98 % -- 118/83 -- -- DII   12/20/24 0554 97.9 °F (36.6 °C) 91 99 % -- 114/82 -- -- DII   12/19/24 2300 98 °F (36.7 °C) -- -- 17 -- -- -- PK   12/19/24 2257 -- 101 100 % 17 134/89 -- -- DII   12/19/24 1928 97.3 °F (36.3 °C) 103 99 % 18 117/89 -- -- DII   12/19/24 1620 -- 99 -- -- 103/66 -- -- JG   12/19/24 1506 97 °F (36.1 °C) 99 98 % 18 103/66 -- -- DII    12/19/24 1106 97.7 °F (36.5 °C) -- -- -- -- -- -- AJ   12/19/24 1106 -- 101 97 % -- 104/76 -- -- DII   12/19/24 0900 -- -- 98 % -- -- No Pain -- BCW   12/19/24 0751 -- 95 97 % -- 111/77 -- -- DII   12/19/24 0436 -- -- -- -- 101/69 -- -- DII   12/19/24 0332 -- 91 95 % -- 90/58 -- -- DII   12/19/24 0331 98.2 °F (36.8 °C) 38 91 % -- 89/63 -- -- DII   12/19/24 0328 98.2 °F (36.8 °C) -- -- -- 89/63 -- -- DII   12/19/24 0051 -- 93 95 % -- 100/69 -- -- DII   12/19/24 0002 -- 93 99 % -- 94/70 -- -- DII   12/18/24 2253 97.1 °F (36.2 °C) 101 96 % 16 105/72 -- -- DII   12/18/24 2009 -- 104 97 % -- 119/99 -- -- DII   12/18/24 2000 -- 104 -- 20 -- No Pain -- MB   12/18/24 2000 98 °F (36.7 °C) -- -- -- 119/99 -- -- DII   12/18/24 1700 -- 96 97 % 20 -- -- -- JS   12/18/24 1500 -- 104 97 % 22 94/61 -- -- JS   12/18/24 1430 -- 104 96 % 20 106/71 -- -- JS   12/18/24 1330 -- 100 98 % 22 109/63 -- --    12/18/24 1300 -- 100 98 % -- 104/76 -- --    12/18/24 1252 -- 98 98 % 20 105/78 -- -- JS   12/18/24 1240 -- -- -- -- 100/70 -- --    12/18/24 1236 97.1 °F (36.2 °C) 106 97 % 26 -- 2 -- CS            Physical Exam  Vitals and nursing note reviewed.   Constitutional:       General: He is not in acute distress.     Appearance: He is well-developed. He is ill-appearing. He is not toxic-appearing.   HENT:      Head: Normocephalic and atraumatic.   Eyes:      Conjunctiva/sclera: Conjunctivae normal.   Cardiovascular:      Rate and Rhythm: Normal rate and regular rhythm.      Heart sounds: No murmur heard.  Pulmonary:      Effort: Tachypnea and respiratory distress present.      Breath sounds: Normal breath sounds. No decreased breath sounds, wheezing, rhonchi or rales.   Abdominal:      Palpations: Abdomen is soft.      Tenderness: There is no abdominal tenderness.   Musculoskeletal:         General: No swelling.      Cervical back: Neck supple.   Skin:     General: Skin is warm and dry.      Capillary Refill: Capillary refill takes  less than 2 seconds.      Coloration: Skin is not pale.      Findings: No erythema or rash.   Neurological:      Mental Status: He is alert.   Psychiatric:         Mood and Affect: Mood normal.         Results Reviewed       Procedure Component Value Units Date/Time    Comprehensive metabolic panel [[id number]]  (Abnormal) Resulted: 12/19/24 0726    Lab Status: Final result Specimen: Blood Updated: 12/19/24 0726     Sodium 131 mmol/L      Potassium 4.7 mmol/L      Chloride 91 mmol/L      CO2 29 mmol/L      ANION GAP 11 mmol/L      BUN 30 mg/dL      Creatinine 1.52 mg/dL      Glucose 96 mg/dL      Calcium 9.5 mg/dL      AST 68 U/L      ALT 98 U/L      Alkaline Phosphatase 126 U/L      Total Protein 6.1 g/dL      Albumin 4.3 g/dL      Total Bilirubin 1.19 mg/dL      eGFR 53 ml/min/1.73sq m     Narrative:      National Kid[employer] Foundation guidelines for Chronic Kidney Disease (CKD):     Stage 1 with normal or high GFR (GFR > 90 mL/min/1.73 square meters)    Stage 2 Mild CKD (GFR = 60-89 mL/min/1.73 square meters)    Stage 3A Moderate CKD (GFR = 45-59 mL/min/1.73 square meters)    Stage 3B Moderate CKD (GFR = 30-44 mL/min/1.73 square meters)    Stage 4 Severe CKD (GFR = 15-29 mL/min/1.73 square meters)    Stage 5 End Stage CKD (GFR <15 mL/min/1.73 square meters)  Note: GFR calculation is accurate only with a steady state creatinine    CBC (With Platelets) [[id number]]  (Normal) Resulted: 12/19/24 0638    Lab Status: Final result Specimen: Blood Updated: 12/19/24 0638     WBC 9.70 Thousand/uL      RBC 4.86 Million/uL      Hemoglobin 15.0 g/dL      Hematocrit 45.6 %      MCV 94 fL      MCH 30.9 pg      MCHC 32.9 g/dL      RDW 14.5 %      Platelets 233 Thousands/uL      MPV 11.2 fL     HS Troponin I 4hr [[id number]]  (Normal) Collected: 12/18/24 1751    Lab Status: Final result Specimen: Blood from Arm, Left Updated: 12/18/24 1821     hs TnI 4hr 36 ng/L      Delta 4hr hsTnI 1 ng/L     HS Troponin I 2hr [[id number]]   (Normal) Collected: 12/18/24 1523    Lab Status: Final result Specimen: Blood from Arm, Left Updated: 12/18/24 1606     hs TnI 2hr 35 ng/L      Delta 2hr hsTnI 0 ng/L     D-Dimer [[id number]]  (Abnormal) Collected: 12/18/24 1523    Lab Status: Final result Specimen: Blood from Arm, Left Updated: 12/18/24 1601     D-Dimer, Quant 1.13 ug/ml FEU     B-Type Natriuretic Peptide(BNP) [[id number]]  (Abnormal) Collected: 12/18/24 1410    Lab Status: Final result Specimen: Blood from Arm, Left Updated: 12/18/24 1530     BNP 1,481 pg/mL     HS Troponin 0hr (reflex protocol) [[id number]]  (Normal) Collected: 12/18/24 1410    Lab Status: Final result Specimen: Blood from Arm, Left Updated: 12/18/24 1443     hs TnI 0hr 35 ng/L     Comprehensive metabolic panel [[id number]]  (Abnormal) Collected: 12/18/24 1410    Lab Status: Final result Specimen: Blood from Arm, Left Updated: 12/18/24 1441     Sodium 126 mmol/L      Potassium 5.3 mmol/L      Chloride 93 mmol/L      CO2 21 mmol/L      ANION GAP 12 mmol/L      BUN 29 mg/dL      Creatinine 1.67 mg/dL      Glucose 122 mg/dL      Calcium 8.9 mg/dL      AST 63 U/L      ALT 87 U/L      Alkaline Phosphatase 132 U/L      Total Protein 6.0 g/dL      Albumin 3.7 g/dL      Total Bilirubin 1.05 mg/dL      eGFR 47 ml/min/1.73sq m     Narrative:      National Kidney Disease Foundation guidelines for Chronic Kidney Disease (CKD):     Stage 1 with normal or high GFR (GFR > 90 mL/min/1.73 square meters)    Stage 2 Mild CKD (GFR = 60-89 mL/min/1.73 square meters)    Stage 3A Moderate CKD (GFR = 45-59 mL/min/1.73 square meters)    Stage 3B Moderate CKD (GFR = 30-44 mL/min/1.73 square meters)    Stage 4 Severe CKD (GFR = 15-29 mL/min/1.73 square meters)    Stage 5 End Stage CKD (GFR <15 mL/min/1.73 square meters)  Note: GFR calculation is accurate only with a steady state creatinine    Magnesium [[id number]]  (Normal) Collected: 12/18/24 1410    Lab Status: Final result Specimen: Blood from Arm, Left  Updated: 12/18/24 1441     Magnesium 2.5 mg/dL     Phosphorus [[id number]]  (Abnormal) Collected: 12/18/24 1410    Lab Status: Final result Specimen: Blood from Arm, Left Updated: 12/18/24 1441     Phosphorus 4.7 mg/dL     CBC and differential [[id number]]  (Abnormal) Collected: 12/18/24 1410    Lab Status: Final result Specimen: Blood from Arm, Left Updated: 12/18/24 1424     WBC 10.97 Thousand/uL      RBC 5.17 Million/uL      Hemoglobin 15.7 g/dL      Hematocrit 48.9 %      MCV 95 fL      MCH 30.4 pg      MCHC 32.1 g/dL      RDW 14.2 %      MPV 11.1 fL      Platelets 290 Thousands/uL      nRBC 0 /100 WBCs      Segmented % 71 %      Immature Grans % 1 %      Lymphocytes % 21 %      Monocytes % 6 %      Eosinophils Relative 1 %      Basophils Relative 0 %      Absolute Neutrophils 7.86 Thousands/µL      Absolute Immature Grans 0.05 Thousand/uL      Absolute Lymphocytes 2.27 Thousands/µL      Absolute Monocytes 0.66 Thousand/µL      Eosinophils Absolute 0.09 Thousand/µL      Basophils Absolute 0.04 Thousands/µL             MRI cardiac  w wo contrast   Final Interpretation by Angelo Rubi MD (01/02 0922)   Impression:   1. Moderately dilated left ventricle with severely reduced systolic function, EF 8%. Severe global hypokinesis with regional variation.   2. Mildly dilated right ventricle with severely reduced systolic function, EF 12%.   3. Moderately dilated bilateral atria. Tricuspid regurgitation visualized.   4. Possible focal transmural scarring of the apical inferior wall, which could represent a small distal infarct. Focal late gadolinium enhancement at the inferior RV insertion point, nonspecific, but can be seen with idiopathic dilated cardiomyopathy.         Workstation performed: [id number]         CTA chest pe study   Final Interpretation by Carlo Mckeon MD (12/18 1910)      No pulmonary embolus. Some limitations due to early scanning with lack of enhancement of the left lower lobe subsegmental  arteries.      Mild cardiomegaly with scattered groundglass opacities and moderate-sized bilateral pleural effusions in keeping with CHF.      Masslike consolidation in the medial aspect of the right middle lobe measuring 3.3 x 3.9 cm on images 602/132 and 2/71. While this may represent atelectasis or pneumonia, an developing mass is not excluded and therefore follow-up is recommended in 3    months by CT scan.      The study was marked in EPIC for immediate notification and follow-up notification at 3 months.            Workstation performed: [id number]         X-ray chest 1 view portable   Final Interpretation by Daniel Luke MD (12/18 1512)      Left pleural effusion and mild cardiomegaly. Limited study            Workstation performed: [id number]             Procedures    ED Medication and Procedure Management   Prior to Admission Medications   Prescriptions Last Dose Informant Patient Reported? Taking?   ALPRAZolam (XANAX) 1 mg tablet 12/17/2024 Bedtime  No Yes   Sig: Take 1 tablet (1 mg total) by mouth daily take 1 tablet by mouth four times a day for anxiety   Empagliflozin (JARDIANCE) 10 MG TABS tablet 12/18/2024 Morning  Yes Yes   Sig: Take 10 mg by mouth every morning   albuterol (Ventolin HFA) 90 mcg/act inhaler 12/18/2024  No Yes   Sig: Inhale 2 puffs 4 (four) times a day   amphetamine-dextroamphetamine (ADDERALL, 10MG,) 10 mg tablet Not Taking  No No   Sig: Take 1 tablet (10 mg total) by mouth 2 (two) times a day Max Daily Amount: 20 mg   Patient not taking: Reported on 12/18/2024   aspirin 81 mg chewable tablet 12/18/2024 Morning  Yes Yes   Sig: Chew 81 mg daily   furosemide (LASIX) 20 mg tablet 12/18/2024 Morning  Yes Yes   Sig: Take 20 mg by mouth daily   metoprolol succinate (TOPROL-XL) 25 mg 24 hr tablet 12/18/2024 Morning  Yes Yes   Sig: Take 25 mg by mouth daily   mupirocin (BACTROBAN) 2 % ointment Not Taking  No No   Sig: Apply topically 3 (three) times a day   Patient not taking:  Reported on 12/18/2024   omeprazole (PriLOSEC) 40 MG capsule 12/18/2024 Morning  No Yes   Sig: Take 1 capsule (40 mg total) by mouth daily   rosuvastatin (CRESTOR) 5 mg tablet 12/18/2024 Morning  Yes Yes   Sig: Take 5 mg by mouth daily   spironolactone (ALDACTONE) 25 mg tablet 12/18/2024 Morning  Yes Yes   Sig: Take 12.5 mg by mouth daily   valsartan (DIOVAN) 40 mg tablet 12/18/2024 Morning  Yes Yes   Sig: Take 40 mg by mouth daily      Facility-Administered Medications: None     Discharge Medication List as of 12/25/2024  3:24 PM        START taking these medications    Details   sacubitril-valsartan (ENTRESTO) 24-26 MG TABS Take 1 tablet by mouth 2 (two) times a day Do not start before December 26, 2024., Starting u 12/26/2024, Normal           CONTINUE these medications which have CHANGED    Details   Empagliflozin (JARDIANCE) 10 MG TABS tablet Take 1 tablet (10 mg total) by mouth every morning Do not start before December 26, 2024., Starting Thu 12/26/2024, No Print      metoprolol succinate (TOPROL-XL) 25 mg 24 hr tablet Take 1 tablet (25 mg total) by mouth 2 (two) times a day, Starting Wed 12/25/2024, Normal      furosemide (LASIX) 20 mg tablet Take 2 tablets (40 mg total) by mouth daily Can take an extra 20 mg in the afternoon the weight increases more than 3 pounds overnight or 5 pounds in a week or lower extremity edema or shortness of breath, Starting Wed 12/25/2024, Normal           CONTINUE these medications which have NOT CHANGED    Details   albuterol (Ventolin HFA) 90 mcg/act inhaler Inhale 2 puffs 4 (four) times a day, Starting Wed 10/16/2024, Normal      ALPRAZolam (XANAX) 1 mg tablet Take 1 tablet (1 mg total) by mouth daily take 1 tablet by mouth four times a day for anxiety, Starting Tue 9/24/2024, Normal      aspirin 81 mg chewable tablet Chew 81 mg daily, Historical Med      omeprazole (PriLOSEC) 40 MG capsule Take 1 capsule (40 mg total) by mouth daily, Starting Tue 9/24/2024, Normal       rosuvastatin (CRESTOR) 5 mg tablet Take 5 mg by mouth daily, Historical Med           STOP taking these medications       spironolactone (ALDACTONE) 25 mg tablet Comments:   Reason for Stopping:         valsartan (DIOVAN) 40 mg tablet Comments:   Reason for Stopping:         amphetamine-dextroamphetamine (ADDERALL, 10MG,) 10 mg tablet Comments:   Reason for Stopping:         mupirocin (BACTROBAN) 2 % ointment Comments:   Reason for Stopping:             Outpatient Discharge Orders   Basic metabolic panel   Standing Status: Future Number of Occurrences: 1 Standing Exp. Date: 12/25/25     Discharge Diet     Activity as tolerated     Call provider for:  persistent nausea or vomiting     Call provider for:  severe uncontrolled pain     Call provider for:  difficulty breathing, headache or visual disturbances     Call provider for:  persistent dizziness or light-headedness     Call provider for:  extreme fatigue     ED SEPSIS DOCUMENTATION   Time reflects when diagnosis was documented in both MDM as applicable and the Disposition within this note       Time User Action Codes Description Comment    12/18/2024  5:10 PM Jareth Mcneal [I50.9] CHF (congestive heart failure) ([facility])     12/18/2024  5:10 PM Jareth Mcneal Add [I13.10] Cardiorenal syndrome     12/18/2024  5:10 PM Jareth Mcneal Remove [I13.10] Cardiorenal syndrome     12/18/2024  5:10 PM Jareth Mcneal [E87.1] Hyponatremia     12/18/2024  5:11 PM Jareth Mcneal [R79.89] Elevated d-dimer     12/18/2024  5:11 PM Jareth Mcneal [E83.39] Hyperphosphatemia     12/18/2024  5:11 PM Jareth Mcneal Add [N17.9] AL (acute kidney injury) ([facility])                  Jareth Mcneal DO  01/03/25 1010

## 2024-12-19 ENCOUNTER — APPOINTMENT (INPATIENT)
Dept: NON INVASIVE DIAGNOSTICS | Facility: HOSPITAL | Age: 49
DRG: 291 | End: 2024-12-19
Payer: COMMERCIAL

## 2024-12-19 LAB
ALBUMIN SERPL BCG-MCNC: 4.3 G/DL (ref 3.5–5)
ALP SERPL-CCNC: 126 U/L (ref 34–104)
ALT SERPL W P-5'-P-CCNC: 98 U/L (ref 7–52)
ANION GAP SERPL CALCULATED.3IONS-SCNC: 11 MMOL/L (ref 4–13)
AST SERPL W P-5'-P-CCNC: 68 U/L (ref 13–39)
BILIRUB SERPL-MCNC: 1.19 MG/DL (ref 0.2–1)
BUN SERPL-MCNC: 30 MG/DL (ref 5–25)
CALCIUM SERPL-MCNC: 9.5 MG/DL (ref 8.4–10.2)
CHLORIDE SERPL-SCNC: 91 MMOL/L (ref 96–108)
CO2 SERPL-SCNC: 29 MMOL/L (ref 21–32)
CREAT SERPL-MCNC: 1.52 MG/DL (ref 0.6–1.3)
ERYTHROCYTE [DISTWIDTH] IN BLOOD BY AUTOMATED COUNT: 14.5 % (ref 11.6–15.1)
GFR SERPL CREATININE-BSD FRML MDRD: 53 ML/MIN/1.73SQ M
GLUCOSE SERPL-MCNC: 96 MG/DL (ref 65–140)
HCT VFR BLD AUTO: 45.6 % (ref 36.5–49.3)
HGB BLD-MCNC: 15 G/DL (ref 12–17)
MAGNESIUM SERPL-MCNC: 2.3 MG/DL (ref 1.9–2.7)
MCH RBC QN AUTO: 30.9 PG (ref 26.8–34.3)
MCHC RBC AUTO-ENTMCNC: 32.9 G/DL (ref 31.4–37.4)
MCV RBC AUTO: 94 FL (ref 82–98)
PLATELET # BLD AUTO: 233 THOUSANDS/UL (ref 149–390)
PMV BLD AUTO: 11.2 FL (ref 8.9–12.7)
POTASSIUM SERPL-SCNC: 4.7 MMOL/L (ref 3.5–5.3)
PROT SERPL-MCNC: 6.1 G/DL (ref 6.4–8.4)
RBC # BLD AUTO: 4.86 MILLION/UL (ref 3.88–5.62)
SODIUM SERPL-SCNC: 131 MMOL/L (ref 135–147)
WBC # BLD AUTO: 9.7 THOUSAND/UL (ref 4.31–10.16)

## 2024-12-19 PROCEDURE — 93321 DOPPLER ECHO F-UP/LMTD STD: CPT | Performed by: INTERNAL MEDICINE

## 2024-12-19 PROCEDURE — 80307 DRUG TEST PRSMV CHEM ANLYZR: CPT

## 2024-12-19 PROCEDURE — 83735 ASSAY OF MAGNESIUM: CPT

## 2024-12-19 PROCEDURE — 80346 BENZODIAZEPINES1-12: CPT

## 2024-12-19 PROCEDURE — 80349 CANNABINOIDS NATURAL: CPT

## 2024-12-19 PROCEDURE — B246ZZZ ULTRASONOGRAPHY OF RIGHT AND LEFT HEART: ICD-10-PCS

## 2024-12-19 PROCEDURE — 80053 COMPREHEN METABOLIC PANEL: CPT | Performed by: INTERNAL MEDICINE

## 2024-12-19 PROCEDURE — 93308 TTE F-UP OR LMTD: CPT | Performed by: INTERNAL MEDICINE

## 2024-12-19 PROCEDURE — 93325 DOPPLER ECHO COLOR FLOW MAPG: CPT | Performed by: INTERNAL MEDICINE

## 2024-12-19 PROCEDURE — 93308 TTE F-UP OR LMTD: CPT

## 2024-12-19 PROCEDURE — G0480 DRUG TEST DEF 1-7 CLASSES: HCPCS

## 2024-12-19 PROCEDURE — 99232 SBSQ HOSP IP/OBS MODERATE 35: CPT | Performed by: NURSE PRACTITIONER

## 2024-12-19 PROCEDURE — 85027 COMPLETE CBC AUTOMATED: CPT | Performed by: INTERNAL MEDICINE

## 2024-12-19 RX ORDER — FUROSEMIDE 10 MG/ML
40 INJECTION INTRAMUSCULAR; INTRAVENOUS
Status: DISCONTINUED | OUTPATIENT
Start: 2024-12-19 | End: 2024-12-21

## 2024-12-19 RX ORDER — ALBUMIN (HUMAN) 12.5 G/50ML
25 SOLUTION INTRAVENOUS ONCE
Status: COMPLETED | OUTPATIENT
Start: 2024-12-19 | End: 2024-12-19

## 2024-12-19 RX ADMIN — ALBUTEROL SULFATE 2 PUFF: 90 AEROSOL, METERED RESPIRATORY (INHALATION) at 17:21

## 2024-12-19 RX ADMIN — ALBUTEROL SULFATE 2 PUFF: 90 AEROSOL, METERED RESPIRATORY (INHALATION) at 21:34

## 2024-12-19 RX ADMIN — HEPARIN SODIUM 5000 UNITS: 5000 INJECTION, SOLUTION INTRAVENOUS; SUBCUTANEOUS at 04:46

## 2024-12-19 RX ADMIN — ALBUTEROL SULFATE 2 PUFF: 90 AEROSOL, METERED RESPIRATORY (INHALATION) at 14:07

## 2024-12-19 RX ADMIN — ALPRAZOLAM 1 MG: 0.5 TABLET ORAL at 17:42

## 2024-12-19 RX ADMIN — ALPRAZOLAM 1 MG: 0.5 TABLET ORAL at 04:46

## 2024-12-19 RX ADMIN — PRAVASTATIN SODIUM 40 MG: 40 TABLET ORAL at 15:42

## 2024-12-19 RX ADMIN — EMPAGLIFLOZIN 10 MG: 10 TABLET, FILM COATED ORAL at 08:57

## 2024-12-19 RX ADMIN — ALBUMIN (HUMAN) 25 G: 0.25 INJECTION, SOLUTION INTRAVENOUS at 04:12

## 2024-12-19 RX ADMIN — ASPIRIN 81 MG CHEWABLE TABLET 81 MG: 81 TABLET CHEWABLE at 08:57

## 2024-12-19 RX ADMIN — FUROSEMIDE 40 MG: 10 INJECTION, SOLUTION INTRAMUSCULAR; INTRAVENOUS at 00:03

## 2024-12-19 RX ADMIN — METOPROLOL SUCCINATE 25 MG: 25 TABLET, FILM COATED, EXTENDED RELEASE ORAL at 08:57

## 2024-12-19 RX ADMIN — HEPARIN SODIUM 5000 UNITS: 5000 INJECTION, SOLUTION INTRAVENOUS; SUBCUTANEOUS at 14:07

## 2024-12-19 RX ADMIN — PANTOPRAZOLE SODIUM 40 MG: 40 TABLET, DELAYED RELEASE ORAL at 04:46

## 2024-12-19 RX ADMIN — PERFLUTREN 0.6 ML/MIN: 6.52 INJECTION, SUSPENSION INTRAVENOUS at 16:41

## 2024-12-19 RX ADMIN — HEPARIN SODIUM 5000 UNITS: 5000 INJECTION, SOLUTION INTRAVENOUS; SUBCUTANEOUS at 21:34

## 2024-12-19 RX ADMIN — FUROSEMIDE 40 MG: 10 INJECTION, SOLUTION INTRAMUSCULAR; INTRAVENOUS at 15:43

## 2024-12-19 NOTE — UTILIZATION REVIEW
NOTIFICATION OF INPATIENT ADMISSION   AUTHORIZATION REQUEST   SERVICING FACILITY:   [facility]  Address: [address] [place] [address]  Tax ID: [id number]  NPI: [id number] ATTENDING PROVIDER:  Attending Name and NPI#: Joel Cardoza Md [[id number]]  Address: [address] [place] [address]  Phone: [phone]   ADMISSION INFORMATION:  Place of Service: Inpatient [facility]  Place of Service Code: 21  Inpatient Admission Date/Time: 12/18/24  5:13 PM  Discharge Date/Time: No discharge date for patient encounter.  Admitting Diagnosis Code/Description:  Hyperphosphatemia [E83.39]  CHF (congestive heart failure) ([facility]) [I50.9]  Hyponatremia [E87.1]  SOB (shortness of breath) [R06.02]  AL (acute kidney injury) ([facility]) [N17.9]  Elevated d-dimer [R79.89]     UTILIZATION REVIEW CONTACT:  Howie Segovia, Utilization   Network Utilization Review Department  Phone: [phone]  Fax: [phone]  Email: [email or web address]  Contact for approvals/pending authorizations, clinical reviews, and discharge.     PHYSICIAN ADVISORY SERVICES:  Medical Necessity Denial & Kbas-gd-Emts Review  Phone: [phone]  Fax: [phone]  Email: [email or web address]     DISCHARGE SUPPORT TEAM:  For Patients Discharge Needs & Updates  Phone: [phone] opt. 2 Fax: [phone]  Email: [email or web address]

## 2024-12-19 NOTE — ASSESSMENT & PLAN NOTE
Baseline seems to be 0.9-1.0 however over the past 2 months lingering 1.40s  Consider multifactorial possibly CRS due to low CO ?  Slight improvement with diuretics then plateau  Plan:  BMP am  Stable on current regimen goal I/O net 2-3L negative in 24 hrs

## 2024-12-19 NOTE — CONSULTS
Consultation - Heart Failure  Martell Mejía 49 y.o. male MRN: [id number]  Unit/Bed#: [facility] 421-01 Encounter: [id number]      Inpatient consult to Cardiology  Consult performed by: Irwin Macias MD  Consult ordered by: Nichole Meek MD      PCP: Jesus Grajeda DO   Outpatient Cardiologist: Sonia GUPTA at [facility], but patient wants to begin following with [facility].     History of Present Illness   Physician Requesting Consult: Joel Cardoza, *  Reason for Consult / Principal Problem: Dyspnea on exertion    Assessment and Plan      Assessment:  # Acute on chronic HFrEF  # Cardiomyopathy, unclear etiology  # Hyponatremia  # AL  # Elevated liver enzymes    Plan:  # Acute on chronic HFrEF  # Cardiomyopathy, unclear etiology  Patient has signs of fluid overload including elevated JVD to the ear, bilateral pleural effusions on CT, elevated BNP 1451, and labs consistent with mild hepatic and renal congestion.   Patient has symptoms of fluid overload including orthopnea, early satiety, and decreased urine output on p.o. Lasix suggesting decreased oral absorption  Currently s/p 250cc bolus and on 50cc/hr infusion, I will discontinue fluids  Start Lasix 40 mg IV x 1 and reassess in the a.m.  Strict ins and outs, daily weights  Goal potassium greater than 4, goal mag greater than 2  Defer TTE for now as patient had a recent TTE performed  Consider cardiac cath assess for burden of CAD      # Hyponatremia  # AL  # Elevated liver enzymes  Potentially all secondary to patient's congestive heart failure  Diuresis as above  Monitor endpoints      Thank you for involving us in the care of your patient.    Subjective     HPI:     Martell is a 49-year-old male with a past medical history of hypertension, hyperlipidemia, HFrEF 20%, and a reported history of multiple congenital ventricular septal defects status post repaired.    Per chart review, he visited an urgent care on 3/27/2023 with concerns of 3 weeks of  lower extremity swelling.  He was asked to present to the ED for further workup but it does not appear as if he follow through.      There was a subsequent ED visit on 10/18/2024 where he presented to an [facility] ED with concerns of shortness of breath and was diagnosed with acute heart failure at that time. HS troponin 102, BNP 1,453. Patient declined hospitalization as he stated he needed to get home to care for his dogs. Patient advised to start lasix 40 mg daily and double his Benicar dose to 80 mg daily; additional IV lasix given prior to departure. Patient also had 7 beat run of NSVT and was strongly advised inpatient hospitalization. Patient ultimately left AMA.     He later saw cardiology during office visit 11/12/2024 and was started on GDMT.  Pharmacologic nuclear stress test was ordered which showed an infarct in the RCA/LCX territory without ischemia.  He followed up again with cardiology on 12/5. Further CAD evaluation was deferred, and his GDMT was uptitrated.  He was deemed euvolemic at that visit and his Lasix was reduced from 40 mg daily to 20 mg daily, though he continues to take 40 mg as he thought he was fluid overloaded.      He recently self continued Entresto due to cough and was transition to valsartan.  Over the past 3 days he has been experiencing progressive dyspnea on exertion that he attributes to starting valsartan.  He has difficult walking up a flight of stairs due to shortness of breath.  Denies any chest pain or palpitations.  He has associated orthopnea, PND, early satiety, and decreased urine output on p.o. Lasix.    Today, he presented to the ED for his symptoms of shortness of breath.    Denies tobacco use, rare alcohol use, occasional marijuana use.  Family History: negative for premature CAD/SCD     Vitals afebrile, heart rate 106, /70, 97% room air.  BNP 1481.  Troponins 35/35/36.  D-dimer 1.13.  Sodium 126, creatinine 1.69, AST 63, ALT 87, alk phos 132.  CXR with left  "pleural effusion  CTA PE with no PE though there was lack of enhancement of the left lower lobe.  Moderate bilateral pleural effusions, questionable atelectasis versus mass in the right middle lobe    11/21/2024 nuclear pharmacologic stress test performed at [facility]:  There is a fixed, large size, severe intensity perfusion defect in the basal to apical inferior, inferoseptal and inferolateral wall segments, which does not improve with pronation. In the presence of akinesis, this is suggestive of infarct.     11/26/2024 TTE at [facility]:  LVEF \"less than 20%\", global hypokinesis, indeterminate diastolic function.  No evidence of apical thrombus with Definity contrast.  R, mildly dilated, systolic function moderately reduced.  Moderately dilated LA, mildly dilated RA.  Trace MR, mild TR, mild ND  Right atrial pressure estimated at 5 to 10 mmHg    2/25/2013 TTE at [facility]:  LVEF 60%, normal RV and atria    Review of Systems  CONSTITUTIONAL: Denies any fever, chills, rigors, and weight loss  HEENT: No earache or tinnitus, denies hearing loss or visual disturbances  CARDIOVASCULAR: As noted in HPI  RESPIRATORY: +cough, +shortness of breath and dyspnea on exertion  GASTROINTESTINAL: Denies any diarrhea or constipation, endorses early satiety   GENITOURINARY: No problems with urination, denies any hematuria or dysuria. Has had decreased urine output on his lasix recently  NEUROLOGIC: No dizziness or vertigo, denies headaches   MUSCULOSKELETAL: Denies any muscle or joint pain   SKIN: Denies skin rashes or itching   ENDOCRINE: Denies excessive thirst, denies intolerance to heat or cold      Objective     Physical Exam  Vitals and nursing note reviewed.   Constitutional:       Appearance: He is well-developed.   HENT:      Head: Normocephalic and atraumatic.      Right Ear: External ear normal.      Left Ear: External ear normal.      Mouth/Throat:      Mouth: Mucous membranes are moist.   Eyes:      Extraocular Movements: Extraocular " movements intact.      Conjunctiva/sclera: Conjunctivae normal.      Pupils: Pupils are equal, round, and reactive to light.   Cardiovascular:      Rate and Rhythm: Normal rate and regular rhythm.      Pulses: Normal pulses.      Heart sounds: Normal heart sounds. No murmur heard.     No friction rub. No gallop.      Comments: +JVD to ear, limbs warm  Pulmonary:      Effort: Pulmonary effort is normal. No respiratory distress.      Breath sounds: Rales present. No wheezing or rhonchi.   Abdominal:      General: Bowel sounds are normal. There is no distension.      Palpations: Abdomen is soft. There is no mass.      Tenderness: There is no abdominal tenderness. There is no guarding.      Hernia: No hernia is present.   Musculoskeletal:         General: No swelling or deformity.      Cervical back: Neck supple.      Right lower leg: No edema.      Left lower leg: No edema.   Skin:     General: Skin is warm and dry.      Capillary Refill: Capillary refill takes less than 2 seconds.      Coloration: Skin is not jaundiced.      Findings: No bruising, lesion or rash.   Neurological:      General: No focal deficit present.      Mental Status: He is alert and oriented to person, place, and time.         Vitals:  Temp:  [97.1 °F (36.2 °C)-98 °F (36.7 °C)] 98 °F (36.7 °C)  HR:  [] 104  BP: ()/(61-99) 119/99  Resp:  [20-26] 20  SpO2:  [96 %-98 %] 97 %  O2 Device: None (Room air)  Orthostatic Blood Pressures      Flowsheet Row Most Recent Value   Blood Pressure 119/99 filed at 12/18/2024 2009            Intake and Outputs:  I/O       None            Labs & Results:          Results from last 7 days   Lab Units 12/18/24  1410   POTASSIUM mmol/L 5.3   CO2 mmol/L 21   CHLORIDE mmol/L 93*   BUN mg/dL 29*   CREATININE mg/dL 1.67*   EGFR ml/min/1.73sq m 47     Results from last 7 days   Lab Units 12/18/24  1410   AST U/L 63*   ALT U/L 87*   ALK PHOS U/L 132*   TOTAL PROTEIN g/dL 6.0*   ALBUMIN g/dL 3.7   TOTAL BILIRUBIN  mg/dL 1.05*         Results from last 7 days   Lab Units 12/18/24  1410   HEMOGLOBIN g/dL 15.7   HEMATOCRIT % 48.9   PLATELETS Thousands/uL 290           Cardiac Imaging/Monitoring     Telemetry:   Personally reviewed by Irwin Macias MD:   Normal sinus    Previous Cath/PCI:  No results found for this or any previous visit.      Prior Echo:   No results found for this or any previous visit.      Prior Stress Test:  No results found for this or any previous visit.       Holter:   N/A    Recent Device Check:  No results found for any visits on 12/18/24.     EKG:  Age and gender specific ECG analysis   Sinus rhythm with 1st degree A-V block  Possible Left atrial enlargement  Right bundle branch block  Inferior infarct (cited on or before 18-Dec-2024)  Poor R Wave Progression  Abnormal ECG  When compared with ECG of 18-Dec-2024 15:22,  Premature ventricular complexes are no longer Present    Irwin Macias MD  Cardiovascular Disease Fellow, FY-1  [facility], [place]

## 2024-12-19 NOTE — ASSESSMENT & PLAN NOTE
Creatinine up until October was from 0.8-1.1, recently his creatinine is 1.4, yesterday creatinine 1.67 and today 1.52  Patient clinically appears euvolemic, he has AL with hyponatremia   Increasing creatinine could be medication induced and intravascular volume depletion, he continued Lasix 40 mg daily, he was started on valsartan a few days ago.  Had poor oral intake in the past few days  He is also on spironolactone and Jardiance  He received a bolus of 250 cc in the ED and tolerated well  Gave Plasma-Lyte 50 cc/h for 500 cc total  Repeat labs again in am  Continue to hold Lasix, valsartan  He also had hypotension which could have contributed to his creatinine elevation

## 2024-12-19 NOTE — PLAN OF CARE
Problem: PAIN - ADULT  Goal: Verbalizes/displays adequate comfort level or baseline comfort level  Description: Interventions:  - Encourage patient to monitor pain and request assistance  - Assess pain using appropriate pain scale  - Administer analgesics based on type and severity of pain and evaluate response  - Implement non-pharmacological measures as appropriate and evaluate response  - Consider cultural and social influences on pain and pain management  - Notify physician/advanced practitioner if interventions unsuccessful or patient reports new pain  Outcome: Progressing     Problem: INFECTION - ADULT  Goal: Absence or prevention of progression during hospitalization  Description: INTERVENTIONS:  - Assess and monitor for signs and symptoms of infection  - Monitor lab/diagnostic results  - Monitor all insertion sites, i.e. indwelling lines, tubes, and drains  - Monitor endotracheal if appropriate and nasal secretions for changes in amount and color  - [place] appropriate cooling/warming therapies per order  - Administer medications as ordered  - Instruct and encourage patient and family to use good hand hygiene technique  - Identify and instruct in appropriate isolation precautions for identified infection/condition  Outcome: Progressing  Goal: Absence of fever/infection during neutropenic period  Description: INTERVENTIONS:  - Monitor WBC    Outcome: Progressing

## 2024-12-19 NOTE — ASSESSMENT & PLAN NOTE
Could be from intravascular volume depletion, he had poor oral intake for a few days prior to admission and he continued to take Lasix 40 mg daily  Received  gentle hydration overnight  Na improved

## 2024-12-19 NOTE — ASSESSMENT & PLAN NOTE
"Wt Readings from Last 3 Encounters:   24 98.7 kg (217 lb 9.5 oz)   24 99.9 kg (220 lb 3.2 oz)   24 103 kg (227 lb 3.2 oz)   Etiology: Suspect combine iCM however more likely NiCM unclear etiology at this time.   POA: CAMERON, PND, orthopnea  BNP 1481, troponin 35/35/36  CXR with L pleural effusion  CTA PE \"moderate BL pleural effusion questionable atelectasis vs mass in R middle lobe\"  I/O: 520cc/1500 net - 24hrs: -980 with -2380 since admission  Wt Bedscale 217--211?  Cardiac Imagin2024 nuclear pharmacologic stress test performed at [facility]:  There is a fixed, large size, severe intensity perfusion defect in the basal to apical inferior, inferoseptal and inferolateral wall segments, which does not improve with pronation. In the presence of akinesis, this is suggestive of infarct.    2024 TTE at [facility]:  LVEF \"less than 20%\", global hypokinesis, indeterminate diastolic function.  No evidence of apical thrombus with Definity contrast.  R, mildly dilated, systolic function moderately reduced.  Moderately dilated LA, mildly dilated RA.  Trace MR, mild TR, mild HI  Right atrial pressure estimated at 5 to 10 mmHg  2013 TTE at [facility]:  LVEF 60%, normal RV and atria  Wt 217 bed scale: I?/UOP 1400cc/24hrs  Telemetry Reviewed NSR HR 90s with frequent PVS, NSVT epsiodes 3-4 beats.  Acute on chronic CHF exacerbations due to under optimization possibly due to medical non compliance due to subjective intolerance  Cardiomyopathy at this time unclear etiology differential is broad possibly idiopathic consider dilated cardiomyopathy in nature vs familiar due to family hx heart disease at young age. Low suspicious infectious as no recent illness, no high risk sexual practices? No recent traveling does have hobbies that can expose to soil? No toxins such as heavy alcohol use or drugs, no autoimmune diseases in the family although does endorse possible IBS however celiac panel outpatient has been noted " negative no radiation exposure. Can consider amyloid?  HF Regimen  Neurohormonal Blockade:  --Beta-Blocker: Toprol XL 25 mg QD  --ACEi, ARB or ARNi: Valsartan 40 QD (on Hold)  --Aldosterone Receptor Blocker: Aldactone 12.5 mg QD (on Hold)  --SGLT2i: Jardiance 10 mg QD  --Diuretic:   Outpatient: Lasix 40mg QD PO    Inpatient: Lasix 40 mg IV once 1x then BID    Sudden Cardiac Death Risk Reduction:  --ICD: LVEF <20% Lifevest from [facility]    Electrical Resynchronization:  --Candidacy for BiV device: Will continue to monitor    Advanced Therapies: Will continue to monitor.   --Inotrope:   --LVAD/Transplant Candidacy: THC+     Plan for Today:  Continue current dose of Lasix 40mg BID subjective adequate UOP, I/O and wt  Daily weights standing  I/O   BMP, Mag QD keep K>4.0, Mag>2.0  Consider Cardiac MRI while inpatient further characterized if any infiltrative vs inflammatory process?  Consider HIV testing, possibly inflammatory markers however no symptoms to elucidate Rheumatologic disease at this time.   TSH negative, fasting glucose overall stable will check Hba1C  Iron panel ruled out possible Hemochromatosis   Fmhx significant for Heart disease young side unclear etiology which will consider genetic testing outpatient  Continue Telemetry while inpatient, lifevest upon DC

## 2024-12-19 NOTE — CASE MANAGEMENT
Case Management Assessment & Discharge Planning Note    Patient name Martell Mejía  Location [facility] 421/[facility] 421-01 MRN [id number]  : 1975 Date 2024       Current Admission Date: 2024  Current Admission Diagnosis:Acute on Chronic systolic heart failure (HCC)   Patient Active Problem List    Diagnosis Date Noted Date Diagnosed    Acute on Chronic systolic heart failure (HCC) 2024     Acute kidney injury (HCC) 2024     Hyponatremia 2024     Transaminitis 2024     Dyspnea on exertion 2024     Attention deficit hyperactivity disorder (ADHD), combined type 2023     COVID-19 08/15/2022     Allergic contact dermatitis 2021     Tick bite 2019     Irritable bowel syndrome with diarrhea 2018     Gastroesophageal reflux disease with esophagitis 2018     Anxiety 01/15/2016     Hypercholesterolemia 2014     Acute gastritis 2014     Benign essential hypertension 2013       LOS (days): 1  Geometric Mean LOS (GMLOS) (days):   Days to GMLOS:     OBJECTIVE:    Risk of Unplanned Readmission Score: 13.59         Current admission status: Inpatient       Preferred Pharmacy:   [id number] - [place], [place] - [facility] [address]  Phone: [phone] Fax: [phone]    Primary Care Provider: Jesus Grajeda DO    Primary Insurance: [employer]  Secondary Insurance:     ASSESSMENT:  Active Health Care Proxies    There are no active Health Care Proxies on file.       Readmission Root Cause  30 Day Readmission: No    Patient Information  Admitted from:: Home  Mental Status: Alert  During Assessment patient was accompanied by: Not accompanied during assessment  Assessment information provided by:: Patient  Primary Caregiver: Self  Support Systems: Self  Home entry access options. Select all that apply.: Stairs  Number of steps to enter home.: 1  Type of Current Residence: 3 Spray home  Upon  entering residence, is there a bedroom on the main floor (no further steps)?: Yes (Patient reports sleeping on couch on first floor)  Upon entering residence, is there a bathroom on the main floor (no further steps)?: Yes  Living Arrangements: Lives Alone    Activities of Daily Living Prior to Admission  Functional Status: Independent  Completes ADLs independently?: Yes  Ambulates independently?: Yes  Does patient currently own DME?: No  Does patient have a history of Outpatient Therapy (PT/OT)?: No  Does the patient have a history of Short-Term Rehab?: No  Does patient have a history of HHC?: No         Patient Information Continued  Income Source: Employed  Does patient have prescription coverage?: Yes  Does patient receive dialysis treatments?: No  Does patient have a history of substance abuse?: No  Does patient have a history of Mental Health Diagnosis?: No         Means of Transportation  Means of Transport to Appts:: Drives Self          DISCHARGE DETAILS:    Discharge planning discussed with:: Patient  Freedom of Choice: Yes     CM contacted family/caregiver?: No- see comments       Other Referral/Resources/Interventions Provided:  Interventions: None Indicated  Referral Comments: No CM needs identified at this time. CM team will continue to follow for discharge planning needs.

## 2024-12-19 NOTE — PROGRESS NOTES
"Progress Note - Heart Failure   Name: Martell Mejía 49 y.o. male I MRN: [id number]  Unit/Bed#: PPHP 421-01 I Date of Admission: 2024   Date of Service: 2024 I Hospital Day: 1     Assessment & Plan  Acute on Chronic systolic heart failure (HCC)  Wt Readings from Last 3 Encounters:   24 98.7 kg (217 lb 9.5 oz)   24 99.9 kg (220 lb 3.2 oz)   24 103 kg (227 lb 3.2 oz)   Etiology: Suspect combine iCM however more likely NiCM unclear etiology at this time.   POA: CAMERON, PND, orthopnea  BNP 1481, troponin   CXR with L pleural effusion  CTA PE \"moderate BL pleural effusion questionable atelectasis vs mass in R middle lobe\"  I/O: 520cc/1500 net - 24hrs: -980 with -2380 since admission  Wt Bedscale 217--211?  Cardiac Imagin2024 nuclear pharmacologic stress test performed at [facility]:  There is a fixed, large size, severe intensity perfusion defect in the basal to apical inferior, inferoseptal and inferolateral wall segments, which does not improve with pronation. In the presence of akinesis, this is suggestive of infarct.    2024 TTE at [facility]:  LVEF \"less than 20%\", global hypokinesis, indeterminate diastolic function.  No evidence of apical thrombus with Definity contrast.  R, mildly dilated, systolic function moderately reduced.  Moderately dilated LA, mildly dilated RA.  Trace MR, mild TR, mild NV  Right atrial pressure estimated at 5 to 10 mmHg  2013 TTE at [facility]:  LVEF 60%, normal RV and atria  Wt 217 bed scale: I?/UOP 1400cc/24hrs  Telemetry Reviewed NSR HR 90s with frequent PVS, NSVT epsiodes 3-4 beats.  Acute on chronic CHF exacerbations due to under optimization possibly due to medical non compliance due to subjective intolerance  Cardiomyopathy at this time unclear etiology differential is broad possibly idiopathic consider dilated cardiomyopathy in nature vs familiar due to family hx heart disease at young age. Low suspicious infectious as no recent illness, no high " risk sexual practices? No recent traveling does have hobbies that can expose to soil? No toxins such as heavy alcohol use or drugs, no autoimmune diseases in the family although does endorse possible IBS however celiac panel outpatient has been noted negative no radiation exposure. Can consider amyloid?  HF Regimen  Neurohormonal Blockade:  --Beta-Blocker: Toprol XL 25 mg QD  --ACEi, ARB or ARNi: Valsartan 40 QD (on Hold)  --Aldosterone Receptor Blocker: Aldactone 12.5 mg QD (on Hold)  --SGLT2i: Jardiance 10 mg QD  --Diuretic:   Outpatient: Lasix 40mg QD PO    Inpatient: Lasix 40 mg IV once 1x then BID    Sudden Cardiac Death Risk Reduction:  --ICD: LVEF <20% Lifevest from [facility]    Electrical Resynchronization:  --Candidacy for BiV device: Will continue to monitor    Advanced Therapies: Will continue to monitor.   --Inotrope:   --LVAD/Transplant Candidacy: THC+     Plan for Today:  Continue current dose of Lasix 40mg BID subjective adequate UOP, I/O and wt  Daily weights standing  I/O   BMP, Mag QD keep K>4.0, Mag>2.0  Consider Cardiac MRI while inpatient further characterized if any infiltrative vs inflammatory process?  Consider HIV testing, possibly inflammatory markers however no symptoms to elucidate Rheumatologic disease at this time.   TSH negative, fasting glucose overall stable will check Hba1C  Iron panel ruled out possible Hemochromatosis   Fmhx significant for Heart disease young side unclear etiology which will consider genetic testing outpatient  Continue Telemetry while inpatient, lifevest upon DC    Acute kidney injury (HCC)  Baseline seems to be 0.9-1.0 however over the past 2 months lingering 1.40s  Consider multifactorial possibly CRS due to low CO ?  Slight improvement with diuretics then plateau  Plan:  BMP am  Stable on current regimen goal I/O net 2-3L negative in 24 hrs  Hyponatremia  Unclear etiology initially thougth to be 2/2 poor po intake however did improved after diuretics then decrease  "again.  Managed by primary  Benign essential hypertension  Hx HTN previously on Benicar  Blood Pressure: 118/83  Acceptable range  On lasix, Toprol  Holding ACE/ARB/ARNI consider start if BP allows downstream  Transaminitis  Likely 2/2 congestive hepatopathy  On Pravachol continue monitoring  Hypercholesterolemia  On Pravachol  Anxiety  Per primary  Gastroesophageal reflux disease with esophagitis    Dyspnea on exertion  Likely 2/2 CHF exacerbation. Imagine with questionable lesion\" mass like appearance will need follow up outpatient   Per primary    Subjective   No acute overnight events reported ongoing improvement of SOB. UOP significant overall tolerating well. No CP, palpitations, lightheadedness or dizziness. Appetite improved.     Objective :  Temp:  [97.1 °F (36.2 °C)-98.2 °F (36.8 °C)] 98.2 °F (36.8 °C)  HR:  [] 95  BP: ()/(58-99) 111/77  Resp:  [16-26] 16  SpO2:  [91 %-99 %] 97 %  O2 Device: None (Room air)  Orthostatic Blood Pressures      Flowsheet Row Most Recent Value   Blood Pressure 111/77 filed at 12/19/2024 0751          First Weight: Weight - Scale: 97.5 kg (215 lb) (12/18/24 1236)  Vitals:    12/18/24 2009 12/19/24 0540   Weight: 98.7 kg (217 lb 11.2 oz) 98.7 kg (217 lb 9.5 oz)     Physical Exam  Vitals and nursing note reviewed.   Constitutional:       General: He is not in acute distress.     Appearance: He is well-developed.   HENT:      Head: Normocephalic and atraumatic.      Right Ear: External ear normal.      Left Ear: External ear normal.   Eyes:      Conjunctiva/sclera: Conjunctivae normal.   Neck:      Comments: KAYLIN +  Cardiovascular:      Rate and Rhythm: Normal rate and regular rhythm.      Pulses: Normal pulses.      Heart sounds: Normal heart sounds. No murmur heard.  Pulmonary:      Effort: Pulmonary effort is normal. No respiratory distress.      Breath sounds: Normal breath sounds.   Abdominal:      General: Bowel sounds are normal. There is no distension.      " "Palpations: Abdomen is soft.      Tenderness: There is no abdominal tenderness.   Musculoskeletal:         General: No swelling.      Cervical back: Neck supple.      Right lower leg: Edema present.      Left lower leg: Edema present.   Skin:     General: Skin is warm and dry.      Capillary Refill: Capillary refill takes less than 2 seconds.   Neurological:      Mental Status: He is alert.   Psychiatric:         Mood and Affect: Mood normal.           Lab Results: I have reviewed the following results:CBC/BMP:   .     12/20/24  0552   WBC 8.87   HGB 14.2   HCT 43.9      SODIUM 127*   K 3.9   CL 93*   CO2 23   BUN 31*   CREATININE 1.53*   GLUC 111    , Creatinine Clearance: Estimated Creatinine Clearance: 67.9 mL/min (A) (by C-G formula based on SCr of 1.53 mg/dL (H))., LFTs:   .     12/20/24  0552   AST 54*   ALT 91*   ALB 3.8   TBILI 1.06*   ALKPHOS 136*      Results from last 7 days   Lab Units 12/19/24  0638 12/18/24  1410   WBC Thousand/uL 9.70 10.97*   HEMOGLOBIN g/dL 15.0 15.7   HEMATOCRIT % 45.6 48.9   PLATELETS Thousands/uL 233 290     Results from last 7 days   Lab Units 12/19/24  0726 12/18/24  1410   POTASSIUM mmol/L 4.7 5.3   CHLORIDE mmol/L 91* 93*   CO2 mmol/L 29 21   BUN mg/dL 30* 29*   CREATININE mg/dL 1.52* 1.67*   CALCIUM mg/dL 9.5 8.9         No results found for: \"HGBA1C\"  No results found for: \"CKTOTAL\", \"CKMB\", \"CKMBINDEX\", \"TROPONINI\"    Imaging Results Review: I reviewed radiology reports from this admission including: chest xray and Ultrasound(s).  Other Study Results Review: EKG was reviewed.     VTE Pharmacologic Prophylaxis: VTE covered by:  heparin (porcine), Subcutaneous, 5,000 Units at 12/20/24 0547     VTE Mechanical Prophylaxis: sequential compression device      "

## 2024-12-19 NOTE — INCIDENTAL FINDINGS
The following findings require follow up:  Radiographic finding   Finding: Masslike consolidation in the medial aspect of the right middle lobe measuring 3.3 x 3.9 cm on images 602/132 and 2/71. While this may represent atelectasis or pneumonia, an developing mass is not excluded and therefore follow-up is recommended in 3   months by CT scan.    Follow up required: repeat CT scan    Follow up should be done within 3 month(s)    Please notify the following clinician to assist with the follow up:   Dr. Radames Grajeda    Incidental finding results were discussed with the Patient by CANDY Gomez on 12/19/24.   They expressed understanding and all questions answered.

## 2024-12-19 NOTE — ASSESSMENT & PLAN NOTE
Wt Readings from Last 3 Encounters:   12/19/24 98.7 kg (217 lb 9.5 oz)   09/24/24 99.9 kg (220 lb 3.2 oz)   02/27/24 103 kg (227 lb 3.2 oz)     In October patient presented to the ED with shortness of breath, found to have new onset heart failure  Echo showed ejection fraction of 20% and he has been following with [facility] since then.   Stress test showed large size, severe intensity infarct in the RCA/LCx territory   Did not have cardiac catheterization at that time but will agree to whatever testing is recommended now  Patient presents with shortness of breath on arrival (12/18/24)   Chest x-ray showed no vascular congestion but CT chest done and reviewed.   CT showed moderate pulm effusion in keeping with CHF and a RML pulmonary 3X3 cm mass--> patient aware that 3 month follow up is required   BNP was elevated at 1481, has AL and mild transaminitis.    Troponin x 2 negative, EKG without acute ischemic changes, has PVCs  In the past month medications were changed due to side effects, new medication introduced for GDMT  12/5/24 Lasix was decreased from 40 mg to 20 mg by cardiology but patient continued to take 40 mg daily, he was afraid he will develop lower extremity swelling  He was on Entresto until last week, due to cough it was switched to valsartan 40 mg daily  He is on spironolactone 12.5 mg daily, Jardiance 10 mg daily, metoprolol succinate 25 mg daily  Received a LifeVest on Friday  Appreciate cardiology input   Follow daily weight and I/O closely  -2200 since admit   Hold on further diuretics until cardiology evaluates

## 2024-12-19 NOTE — ASSESSMENT & PLAN NOTE
Mild transaminitis, no abdominal pain or tenderness but he feels full in the abdomen   Clinically euvolemic to overloaded   Doing better after 1 dose of lasix with great UO  Was able to eat today

## 2024-12-19 NOTE — UTILIZATION REVIEW
"Initial Clinical Review    Admission: Date/Time/Statement:   Admission Orders (From admission, onward)       Ordered        12/18/24 1713  INPATIENT ADMISSION  Once                          Orders Placed This Encounter   Procedures    INPATIENT ADMISSION     Standing Status:   Standing     Number of Occurrences:   1     Level of Care:   Med Surg [16]     Estimated length of stay:   More than 2 Midnights     Certification:   I certify that inpatient services are medically necessary for this patient for a duration of greater than two midnights. See H&P and MD Progress Notes for additional information about the patient's course of treatment.     ED Arrival Information       Expected   -    Arrival   12/18/2024 12:32    Acuity   Emergent              Means of arrival   Walk-In    Escorted by   Family Member    Service   Hospitalist    Admission type   Emergency              Arrival complaint   SOB             Chief Complaint   Patient presents with    Shortness of Breath     C/o recent heart attack on October potentially shown on recent EKG, recently took off entresto due to cough but started \"valsartan\" on Friday and causing SOB. Life vest patient.       Initial Presentation: 49 y.o. male with a PMH of HTN, HLD, systolic heart failure presented to the ED from home w/ SOB.  Pt was dx w/ new onset systolic HF w/ EF 20% on 10/2024. nuclear stress test showed large sized severe intensity infarct in the RCA/LCx territory.  His regimen was changed in the past month to control volume status, for GDMT and medication side effect.  Last seen by OP cards on 12/05, his Lasix was decreased from 40 mg to 20 mg daily, started on Entresto and Jardiance, on spironolactone and metoprolol. On Entresto he developed a cough last week, dc'd and started on valsartan. He continued to take 40 mg Lasix daily instead of 20 mg. Reports poor intake for the past few days. Lost significant weight recently. He received Lifevest from [facility] last Friday. "   In the ED, Chest x-ray showed no vascular congestion. BNP elevated at 1481,  AL- creatinine 1.67, 0.8-1.1 10/2024, 1.4 most recent and mild transaminitis. D-dimer was 1.1. . Troponin x 2 negative, EKG without acute ischemic changes, has PVCs.  On exam, lungs are clear to auscultation, wt 218 lbs, baseline 220 lbs. Given 250 cc IVF bolus.     Admit as Inpatient for evaluation and treatment of CAMERON, AL, hyponatremia, transaminitis.  Plan; Cardiology consult. Obtain CT PE. start Plasma-Lyte 50 cc/h for 500 cc total. Repeat labs tomorrow. Hold Lasix, valsartan    Anticipated Length of Stay/Certification Statement: Patient will be admitted on an inpatient basis with an anticipated length of stay of greater than 2 midnights secondary to shortness of breath.     Cardiology Consult; Acute on chronic HFrEF, Cardiomyopathy, unclear etiology:  Pt has signs of fluid overload including elevated JVD to the ear, bilateral pleural effusions on CT, elevated BNP 1451, and labs consistent with mild hepatic and renal congestion. Symptoms of  orthopnea, early satiety, and decreased urine output on p.o. Lasix suggesting decreased oral absorption   Plan: Start Lasix 40 mg IV x 1 and reassess in the a.m. Strict ins and outs, daily weights. Goal potassium greater than 4, goal mag greater than 2. Defer TTE for now as patient had a recent TTE performed. Consider cardiac cath assess for burden of CAD.      Date: 12/19   Day 2: Pt reports feeling better today. Breathing easier and urinating a lot. Follow daily weight and I/O closely. Hold on further diuretics until cardiology evaluates.  Na improved. Repeat labs again in am. Continue to hold Lasix, valsartan.    Cardiology Notes: Pt reports feeling better  after escalated IV diuresis last night, good UO response, Cr better. Ongoing AL now. Cont IV diuresis.         ED Treatment-Medication Administration from 12/18/2024 1232 to 12/18/2024 1957         Date/Time Order Dose Route Action      12/18/2024 1708 sodium chloride 0.9 % bolus 250 mL 250 mL Intravenous New Bag     12/18/2024 1719 iohexol (OMNIPAQUE) 350 MG/ML injection (SINGLE-DOSE) 85 mL 85 mL Intravenous Given     12/18/2024 1942 albuterol (PROVENTIL HFA,VENTOLIN HFA) inhaler 2 puff 2 puff Inhalation Given     12/18/2024 1939 pravastatin (PRAVACHOL) tablet 40 mg 40 mg Oral Given            Scheduled Medications:  albumin human (FLEXBUMIN) 25 % injection 25 g IV once 12/19   albuterol, 2 puff, Inhalation, 4x Daily  aspirin, 81 mg, Oral, Daily  Empagliflozin, 10 mg, Oral, QAM  furosemide (LASIX) injection 40 mg IV once 12/19   heparin (porcine), 5,000 Units, Subcutaneous, Q8H ALEJANDRO  metoprolol succinate, 25 mg, Oral, Daily  pantoprazole, 40 mg, Oral, Early Morning  pravastatin, 40 mg, Oral, Daily With Dinner      Continuous IV Infusions:  multi-electrolyte (PLASMALYTE-A/ISOLYTE-S PH 7.4) IV solution  Rate: 50 mL/hr Dose: 50 mL/hr  Freq: Continuous Route: IV  Last Dose: Stopped (12/18/24 2300)  Start: 12/18/24 1830 End: 12/18/24 2234       PRN Meds:  ALPRAZolam, 1 mg, Oral, TID PRN 12/19 x 1  sodium chloride (PF), 3 mL, Intravenous, Q1H PRN      ED Triage Vitals   Temperature Pulse Respirations Blood Pressure SpO2 Pain Score   12/18/24 1236 12/18/24 1236 12/18/24 1236 12/18/24 1240 12/18/24 1236 12/18/24 1236   (!) 97.1 °F (36.2 °C) (!) 106 (!) 26 100/70 97 % 2     Weight (last 2 days)       Date/Time Weight    12/19/24 0540 98.7 (217.59)    12/18/24 20:09:06 98.7 (217.7)    12/18/24 1339 98.9 (218.04)    12/18/24 1236 97.5 (215)            Vital Signs (last 3 days)       Date/Time Temp Pulse Resp BP MAP (mmHg) SpO2 O2 Device Pain    12/19/24 11:06:59 -- 101 -- 104/76 85 97 % -- --    12/19/24 0900 -- -- -- -- -- 98 % None (Room air) No Pain    12/19/24 07:51:23 -- 95 -- 111/77 88 97 % -- --    12/19/24 04:36:02 -- -- -- 101/69 80 -- -- --    12/19/24 03:32:54 -- 91 -- 90/58 69 95 % -- --    12/19/24 03:31:05 98.2 °F (36.8 °C) 38 -- 89/63 72 91  % -- --    12/19/24 03:28:39 98.2 °F (36.8 °C) -- -- 89/63 72 -- -- --    12/19/24 00:51:54 -- 93 -- 100/69 79 95 % -- --    12/19/24 00:02:20 -- 93 -- 94/70 78 99 % -- --    12/18/24 22:53:56 97.1 °F (36.2 °C) 101 16 105/72 83 96 % -- --    12/18/24 20:09:06 -- 104 -- 119/99 106 97 % -- --    12/18/24 20:00:30 98 °F (36.7 °C) 104 20 119/99 106 -- -- No Pain    12/18/24 1700 -- 96 20 -- -- 97 % None (Room air) --    12/18/24 1500 -- 104 22 94/61 73 97 % None (Room air) --    12/18/24 1430 -- 104 20 106/71 83 96 % None (Room air) --    12/18/24 1330 -- 100 22 109/63 81 98 % None (Room air) --    12/18/24 1300 -- 100 -- 104/76 85 98 % None (Room air) --    12/18/24 1252 -- 98 20 105/78 -- 98 % None (Room air) --    12/18/24 1240 -- -- -- 100/70 82 -- -- --    12/18/24 1236 97.1 °F (36.2 °C) 106 26 -- -- 97 % None (Room air) 2              Pertinent Labs/Diagnostic Test Results:   Radiology:  CTA chest pe study   Final Interpretation by Carlo Mckeon MD (12/18 1910)      No pulmonary embolus. Some limitations due to early scanning with lack of enhancement of the left lower lobe subsegmental arteries.      Mild cardiomegaly with scattered groundglass opacities and moderate-sized bilateral pleural effusions in keeping with CHF.      Masslike consolidation in the medial aspect of the right middle lobe measuring 3.3 x 3.9 cm on images 602/132 and 2/71. While this may represent atelectasis or pneumonia, an developing mass is not excluded and therefore follow-up is recommended in 3    months by CT scan.      The study was marked in EPIC for immediate notification and follow-up notification at 3 months.            Workstation performed: [id number]         X-ray chest 1 view portable   Final Interpretation by Daniel Luke MD (12/18 1512)      Left pleural effusion and mild cardiomegaly. Limited study            Workstation performed: [id number]           Cardiology:  ECG 12 lead   Final Result by Conchis Morales MD  (12/18 1800)   Age and gender specific ECG analysis    Sinus rhythm with 1st degree A-V block   Possible Left atrial enlargement   Right bundle branch block   Inferior infarct (cited on or before 18-Dec-2024)   Poor R Wave Progression   Abnormal ECG   When compared with ECG of 18-Dec-2024 15:22,   Premature ventricular complexes are no longer Present   Confirmed by Conchis Morales ([id number]) on 12/18/2024 6:00:28 PM      ECG 12 lead   Final Result by Conchis Morales MD (12/18 2389)   Age and gender specific ECG analysis    Sinus tachycardia with 1st degree A-V block with occasional Premature    ventricular complexes   Possible Left atrial enlargement   Right bundle branch block   Right axis deviation   Possible Inferior infarct (cited on or before 18-Dec-2024)   Cannot rule out Anterior infarct   Abnormal ECG   When compared with ECG of 18-Dec-2024 12:47, (unconfirmed)   Premature ventricular complexes are now Present   Confirmed by Conchis Morales ([id number]) on 12/18/2024 3:58:34 PM      ECG 12 lead   Final Result by Conchis Morales MD (12/18 7357)   Age and gender specific ECG analysis    Sinus tachycardia   Possible Left atrial enlargement   Right bundle branch block   Inferior infarct , age undetermined   Abnormal ECG   No previous ECGs available   Confirmed by Conchis Morales ([id number]) on 12/18/2024 3:57:01 PM        GI:  No orders to display           Results from last 7 days   Lab Units 12/19/24  0638 12/18/24  1410   WBC Thousand/uL 9.70 10.97*   HEMOGLOBIN g/dL 15.0 15.7   HEMATOCRIT % 45.6 48.9   PLATELETS Thousands/uL 233 290   TOTAL NEUT ABS Thousands/µL  --  7.86*         Results from last 7 days   Lab Units 12/19/24  0726 12/19/24  0726 12/18/24  1410   SODIUM mmol/L 131*  --  126*   POTASSIUM mmol/L 4.7  --  5.3   CHLORIDE mmol/L 91*  --  93*   CO2 mmol/L 29  --  21   ANION GAP mmol/L 11  --  12   BUN mg/dL 30*  --  29*   CREATININE mg/dL 1.52*  --  1.67*   EGFR ml/min/1.73sq m 53  --  47   CALCIUM mg/dL 9.5  --   "8.9   MAGNESIUM mg/dL  --  2.3 2.5   PHOSPHORUS mg/dL  --   --  4.7*     Results from last 7 days   Lab Units 12/19/24  0726 12/18/24  1410   AST U/L 68* 63*   ALT U/L 98* 87*   ALK PHOS U/L 126* 132*   TOTAL PROTEIN g/dL 6.1* 6.0*   ALBUMIN g/dL 4.3 3.7   TOTAL BILIRUBIN mg/dL 1.19* 1.05*         Results from last 7 days   Lab Units 12/19/24  0726 12/18/24  1410   GLUCOSE RANDOM mg/dL 96 122             No results found for: \"BETA-HYDROXYBUTYRATE\"                   Results from last 7 days   Lab Units 12/18/24  1751 12/18/24  1523 12/18/24  1410   HS TNI 0HR ng/L  --   --  35   HS TNI 2HR ng/L  --  35  --    HSTNI D2 ng/L  --  0  --    HS TNI 4HR ng/L 36  --   --    HSTNI D4 ng/L 1  --   --      Results from last 7 days   Lab Units 12/18/24  1523   D-DIMER QUANTITATIVE ug/ml FEU 1.13*                             Results from last 7 days   Lab Units 12/18/24  1410   BNP pg/mL 1,481*                                                                                       Past Medical History:   Diagnosis Date    Allergic     Anxiety     Depression     GERD (gastroesophageal reflux disease)     Hyperlipidemia     Hypertension      Present on Admission:   Anxiety   Benign essential hypertension   Hypercholesterolemia   Gastroesophageal reflux disease with esophagitis      Admitting Diagnosis: Hyperphosphatemia [E83.39]  CHF (congestive heart failure) ([facility]) [I50.9]  Hyponatremia [E87.1]  SOB (shortness of breath) [R06.02]  AL (acute kidney injury) ([facility]) [N17.9]  Elevated d-dimer [R79.89]  Age/Sex: 49 y.o. male    Network Utilization Review Department  ATTENTION: Please call with any questions or concerns to [phone] and carefully listen to the prompts so that you are directed to the right person. All voicemails are confidential.   For Discharge needs, contact Care Management DC Support Team at [phone] opt. 2  Send all requests for admission clinical reviews, approved or denied determinations and any other " requests to dedicated fax number below belonging to the campus where the patient is receiving treatment. List of dedicated fax numbers for the Facilities:  FACILITY NAME UR FAX NUMBER   ADMISSION DENIALS (Administrative/Medical Necessity) [phone]   DISCHARGE SUPPORT TEAM (NETWORK) [phone]   PARENT CHILD HEALTH (Maternity/NICU/Pediatrics) [phone]   [facility] [phone]   [facility] [phone]   [facility] [phone]   [facility] [phone]   [facility] [phone]   [facility] [phone]   [facility] [phone]   [facility] [phone]   [facility] [phone]   [facility] [phone]   [facility] [phone]   [facility] [phone]

## 2024-12-19 NOTE — ASSESSMENT & PLAN NOTE
Continue metoprolol succinate 25 mg daily  Holding Lasix, spironolactone and valsartan due to AL and hypotension

## 2024-12-19 NOTE — PROGRESS NOTES
Progress Note - Hospitalist   Name: Martell Mejía 49 y.o. male I MRN: [id number]  Unit/Bed#: [facility] 421-01 I Date of Admission: 12/18/2024   Date of Service: 12/19/2024 I Hospital Day: 1    Assessment & Plan  Acute on Chronic systolic heart failure (HCC)  Wt Readings from Last 3 Encounters:   12/19/24 98.7 kg (217 lb 9.5 oz)   09/24/24 99.9 kg (220 lb 3.2 oz)   02/27/24 103 kg (227 lb 3.2 oz)     In October patient presented to the ED with shortness of breath, found to have new onset heart failure  Echo showed ejection fraction of 20% and he has been following with [facility] since then.   Stress test showed large size, severe intensity infarct in the RCA/LCx territory   Did not have cardiac catheterization at that time but will agree to whatever testing is recommended now  Patient presents with shortness of breath on arrival (12/18/24)   Chest x-ray showed no vascular congestion but CT chest done and reviewed.   CT showed moderate pulm effusion in keeping with CHF and a RML pulmonary 3X3 cm mass--> patient aware that 3 month follow up is required   BNP was elevated at 1481, has AL and mild transaminitis.    Troponin x 2 negative, EKG without acute ischemic changes, has PVCs  In the past month medications were changed due to side effects, new medication introduced for GDMT  12/5/24 Lasix was decreased from 40 mg to 20 mg by cardiology but patient continued to take 40 mg daily, he was afraid he will develop lower extremity swelling  He was on Entresto until last week, due to cough it was switched to valsartan 40 mg daily  He is on spironolactone 12.5 mg daily, Jardiance 10 mg daily, metoprolol succinate 25 mg daily  Received a LifeVest on Friday  Appreciate cardiology input   Follow daily weight and I/O closely  -2200 since admit   Hold on further diuretics until cardiology evaluates     Dyspnea on exertion  Seems to be ongoing since October  Most likely related to ischemic cardiomyopathy  D-dimer was 1.1, CT PE was done  and results are pending  Acute kidney injury (HCC)  Creatinine up until October was from 0.8-1.1, recently his creatinine is 1.4, yesterday creatinine 1.67 and today 1.52  Patient clinically appears euvolemic, he has AL with hyponatremia   Increasing creatinine could be medication induced and intravascular volume depletion, he continued Lasix 40 mg daily, he was started on valsartan a few days ago.  Had poor oral intake in the past few days  He is also on spironolactone and Jardiance  He received a bolus of 250 cc in the ED and tolerated well  Gave Plasma-Lyte 50 cc/h for 500 cc total  Repeat labs again in am  Continue to hold Lasix, valsartan  He also had hypotension which could have contributed to his creatinine elevation   Hyponatremia  Could be from intravascular volume depletion, he had poor oral intake for a few days prior to admission and he continued to take Lasix 40 mg daily  Received  gentle hydration overnight  Na improved   Transaminitis  Mild transaminitis, no abdominal pain or tenderness but he feels full in the abdomen   Clinically euvolemic to overloaded   Doing better after 1 dose of lasix with great UO  Was able to eat today   Anxiety  Continue Xanax 1 mg 3 times daily as needed  PDMP reviewed  Benign essential hypertension  Continue metoprolol succinate 25 mg daily  Holding Lasix, spironolactone and valsartan due to AL and hypotension   Hypercholesterolemia  On Crestor 5 mg daily, substitute for pravastatin 40 mg daily  Gastroesophageal reflux disease with esophagitis  Continue pantoprazole 40 mg daily    VTE Pharmacologic Prophylaxis: VTE Score: 3 Moderate Risk (Score 3-4) - Pharmacological DVT Prophylaxis Ordered: heparin.    Mobility:   Basic Mobility Inpatient Raw Score: 24  JH-HLM Goal: 8: Walk 250 feet or more  JH-HLM Achieved: 7: Walk 25 feet or more  JH-HLM Goal achieved. Continue to encourage appropriate mobility.    Patient Centered Rounds: I performed bedside rounds with nursing staff  today.   Discussions with Specialists or Other Care Team Provider: Primary RN and CM    Education and Discussions with Family / Patient: Patient declined call to .     Current Length of Stay: 1 day(s)  Current Patient Status: Inpatient   Certification Statement: The patient will continue to require additional inpatient hospital stay due to acute heart failure   Discharge Plan: Anticipate discharge in 48 hrs to home.    Code Status: Level 1 - Full Code    Subjective   Feeling much better today. Breathing easier and urinating a lot. Will do whatever testing is requested of him. No CP    Objective :  Temp:  [97.1 °F (36.2 °C)-98.2 °F (36.8 °C)] 97.7 °F (36.5 °C)  HR:  [] 101  BP: ()/(58-99) 104/76  Resp:  [16-22] 16  SpO2:  [91 %-99 %] 97 %  O2 Device: None (Room air)    Body mass index is 31.22 kg/m².     Input and Output Summary (last 24 hours):     Intake/Output Summary (Last 24 hours) at 12/19/2024 1238  Last data filed at 12/19/2024 1001  Gross per 24 hour   Intake --   Output 2200 ml   Net -2200 ml       Physical Exam  Constitutional:       General: He is not in acute distress.     Appearance: He is not ill-appearing.   HENT:      Nose: Nose normal.      Mouth/Throat:      Mouth: Mucous membranes are moist.   Cardiovascular:      Rate and Rhythm: Normal rate and regular rhythm.      Pulses: Normal pulses.      Heart sounds: Normal heart sounds.   Pulmonary:      Effort: Pulmonary effort is normal.      Breath sounds: Normal breath sounds.   Abdominal:      General: Abdomen is flat.      Palpations: Abdomen is soft.   Musculoskeletal:         General: Normal range of motion.   Skin:     General: Skin is warm and dry.      Capillary Refill: Capillary refill takes less than 2 seconds.   Neurological:      General: No focal deficit present.      Mental Status: He is alert and oriented to person, place, and time.   Psychiatric:         Mood and Affect: Mood normal.            Lines/Drains:        Telemetry:  Telemetry Orders (From admission, onward)               24 Hour Telemetry Monitoring  Continuous x 24 Hours (Telem)        Expiring   Question:  Reason for 24 Hour Telemetry  Answer:  Decompensated CHF- and any one of the following: continuous diuretic infusion or total diuretic dose >200 mg daily, associated electrolyte derangement (I.e. K < 3.0), inotropic drip (continuous infusion), hx of ventricular arrhythmia, or new EF < 35%        LifeVest Patient: Continuous Telemetry Monitoring during hospitalization (non-expiring)  Continuous LifeVest Telemetry Monitoring        References:    LifeVest Policy                     Telemetry Reviewed: Normal Sinus Rhythm  Indication for Continued Telemetry Use: Lifevest (remains on tele entire hospital stay)               Lab Results: I have reviewed the following results:   Results from last 7 days   Lab Units 12/19/24  0638 12/18/24  1410   WBC Thousand/uL 9.70 10.97*   HEMOGLOBIN g/dL 15.0 15.7   HEMATOCRIT % 45.6 48.9   PLATELETS Thousands/uL 233 290   SEGS PCT %  --  71   LYMPHO PCT %  --  21   MONO PCT %  --  6   EOS PCT %  --  1     Results from last 7 days   Lab Units 12/19/24  0726   SODIUM mmol/L 131*   POTASSIUM mmol/L 4.7   CHLORIDE mmol/L 91*   CO2 mmol/L 29   BUN mg/dL 30*   CREATININE mg/dL 1.52*   ANION GAP mmol/L 11   CALCIUM mg/dL 9.5   ALBUMIN g/dL 4.3   TOTAL BILIRUBIN mg/dL 1.19*   ALK PHOS U/L 126*   ALT U/L 98*   AST U/L 68*   GLUCOSE RANDOM mg/dL 96                       Recent Cultures (last 7 days):         Imaging Results Review: I reviewed radiology reports from this admission including: CT chest.  Other Study Results Review: No additional pertinent studies reviewed.    Last 24 Hours Medication List:     Current Facility-Administered Medications:     albuterol (PROVENTIL HFA,VENTOLIN HFA) inhaler 2 puff, 4x Daily    ALPRAZolam (XANAX) tablet 1 mg, TID PRN    aspirin chewable tablet 81 mg, Daily     Empagliflozin (JARDIANCE) tablet 10 mg, QAM    heparin (porcine) subcutaneous injection 5,000 Units, Q8H ALEJANDRO **AND** [CANCELED] Platelet count, Once    metoprolol succinate (TOPROL-XL) 24 hr tablet 25 mg, Daily    pantoprazole (PROTONIX) EC tablet 40 mg, Early Morning    pravastatin (PRAVACHOL) tablet 40 mg, Daily With Dinner    Insert peripheral IV, Once **AND** sodium chloride (PF) 0.9 % injection 3 mL, Q1H PRN    Administrative Statements       **Please Note: This note may have been constructed using a voice recognition system.**

## 2024-12-19 NOTE — ASSESSMENT & PLAN NOTE
Continue Xanax 1 mg 3 times daily as needed  PDMP reviewed   Wartpeel Counseling:  I discussed with the patient the risks of Wartpeel including but not limited to erythema, scaling, itching, weeping, crusting, and pain.

## 2024-12-20 LAB
ALBUMIN SERPL BCG-MCNC: 3.8 G/DL (ref 3.5–5)
ALP SERPL-CCNC: 136 U/L (ref 34–104)
ALT SERPL W P-5'-P-CCNC: 91 U/L (ref 7–52)
ANION GAP SERPL CALCULATED.3IONS-SCNC: 11 MMOL/L (ref 4–13)
APICAL FOUR CHAMBER EJECTION FRACTION: 28 %
AST SERPL W P-5'-P-CCNC: 54 U/L (ref 13–39)
BACTERIA UR QL AUTO: ABNORMAL /HPF
BASOPHILS # BLD AUTO: 0.04 THOUSANDS/ÂΜL (ref 0–0.1)
BASOPHILS NFR BLD AUTO: 1 % (ref 0–1)
BILIRUB SERPL-MCNC: 1.06 MG/DL (ref 0.2–1)
BILIRUB UR QL STRIP: NEGATIVE
BSA FOR ECHO PROCEDURE: 2.16 M2
BUN SERPL-MCNC: 31 MG/DL (ref 5–25)
CALCIUM SERPL-MCNC: 8.8 MG/DL (ref 8.4–10.2)
CHLORIDE SERPL-SCNC: 93 MMOL/L (ref 96–108)
CLARITY UR: CLEAR
CO2 SERPL-SCNC: 23 MMOL/L (ref 21–32)
COLOR UR: ABNORMAL
CREAT SERPL-MCNC: 1.53 MG/DL (ref 0.6–1.3)
CREAT UR-MCNC: 95.7 MG/DL
EOSINOPHIL # BLD AUTO: 0.23 THOUSAND/ÂΜL (ref 0–0.61)
EOSINOPHIL NFR BLD AUTO: 3 % (ref 0–6)
ERYTHROCYTE [DISTWIDTH] IN BLOOD BY AUTOMATED COUNT: 14.6 % (ref 11.6–15.1)
EST. AVERAGE GLUCOSE BLD GHB EST-MCNC: 140 MG/DL
FERRITIN SERPL-MCNC: 125 NG/ML (ref 24–336)
GFR SERPL CREATININE-BSD FRML MDRD: 52 ML/MIN/1.73SQ M
GLUCOSE SERPL-MCNC: 111 MG/DL (ref 65–140)
GLUCOSE UR STRIP-MCNC: ABNORMAL MG/DL
HBA1C MFR BLD: 6.5 %
HCT VFR BLD AUTO: 43.9 % (ref 36.5–49.3)
HGB BLD-MCNC: 14.2 G/DL (ref 12–17)
HGB UR QL STRIP.AUTO: NEGATIVE
HIV 1+2 AB+HIV1 P24 AG SERPL QL IA: NORMAL
HIV 2 AB SERPL QL IA: NORMAL
HIV1 AB SERPL QL IA: NORMAL
HIV1 P24 AG SERPL QL IA: NORMAL
IGA SERPL-MCNC: 225 MG/DL (ref 66–433)
IMM GRANULOCYTES # BLD AUTO: 0.05 THOUSAND/UL (ref 0–0.2)
IMM GRANULOCYTES NFR BLD AUTO: 1 % (ref 0–2)
IRON SATN MFR SERPL: 13 % (ref 15–50)
IRON SERPL-MCNC: 55 UG/DL (ref 50–212)
IVC: 1.9 MM
KETONES UR STRIP-MCNC: NEGATIVE MG/DL
LEFT VENTRICLE DIASTOLIC VOLUME (MOD BIPLANE): 203 ML
LEFT VENTRICLE DIASTOLIC VOLUME INDEX (MOD BIPLANE): 94 ML/M2
LEFT VENTRICLE SYSTOLIC VOLUME (MOD BIPLANE): 156 ML
LEFT VENTRICLE SYSTOLIC VOLUME INDEX (MOD BIPLANE): 72.2 ML/M2
LEFT VENTRICULAR INTERNAL DIMENSION IN DIASTOLE: 5.8 CM (ref 3.5–6)
LEUKOCYTE ESTERASE UR QL STRIP: NEGATIVE
LV EF: 23 %
LYMPHOCYTES # BLD AUTO: 2.25 THOUSANDS/ÂΜL (ref 0.6–4.47)
LYMPHOCYTES NFR BLD AUTO: 25 % (ref 14–44)
MCH RBC QN AUTO: 30.3 PG (ref 26.8–34.3)
MCHC RBC AUTO-ENTMCNC: 32.3 G/DL (ref 31.4–37.4)
MCV RBC AUTO: 94 FL (ref 82–98)
MONOCYTES # BLD AUTO: 0.69 THOUSAND/ÂΜL (ref 0.17–1.22)
MONOCYTES NFR BLD AUTO: 8 % (ref 4–12)
NEUTROPHILS # BLD AUTO: 5.61 THOUSANDS/ÂΜL (ref 1.85–7.62)
NEUTS SEG NFR BLD AUTO: 62 % (ref 43–75)
NITRITE UR QL STRIP: NEGATIVE
NON-SQ EPI CELLS URNS QL MICRO: ABNORMAL /HPF
NRBC BLD AUTO-RTO: 0 /100 WBCS
PH UR STRIP.AUTO: 5 [PH]
PLATELET # BLD AUTO: 219 THOUSANDS/UL (ref 149–390)
PMV BLD AUTO: 11.1 FL (ref 8.9–12.7)
POTASSIUM SERPL-SCNC: 3.9 MMOL/L (ref 3.5–5.3)
PROT SERPL-MCNC: 5.4 G/DL (ref 6.4–8.4)
PROT UR STRIP-MCNC: ABNORMAL MG/DL
RA PRESSURE ESTIMATED: 10 MMHG
RBC # BLD AUTO: 4.68 MILLION/UL (ref 3.88–5.62)
RBC #/AREA URNS AUTO: ABNORMAL /HPF
SL CV LV EF: 15
SODIUM 24H UR-SCNC: <10 MOL/L
SODIUM SERPL-SCNC: 127 MMOL/L (ref 135–147)
SP GR UR STRIP.AUTO: 1.01 (ref 1–1.03)
TIBC SERPL-MCNC: 411.6 UG/DL (ref 250–450)
TRANSFERRIN SERPL-MCNC: 294 MG/DL (ref 203–362)
TTG IGA SER IA-ACNC: 0.8 U/ML (ref ?–10)
UIBC SERPL-MCNC: 357 UG/DL (ref 155–355)
UROBILINOGEN UR STRIP-ACNC: <2 MG/DL
WBC # BLD AUTO: 8.87 THOUSAND/UL (ref 4.31–10.16)
WBC #/AREA URNS AUTO: ABNORMAL /HPF

## 2024-12-20 PROCEDURE — 82784 ASSAY IGA/IGD/IGG/IGM EACH: CPT | Performed by: INTERNAL MEDICINE

## 2024-12-20 PROCEDURE — 99232 SBSQ HOSP IP/OBS MODERATE 35: CPT | Performed by: STUDENT IN AN ORGANIZED HEALTH CARE EDUCATION/TRAINING PROGRAM

## 2024-12-20 PROCEDURE — 87389 HIV-1 AG W/HIV-1&-2 AB AG IA: CPT

## 2024-12-20 PROCEDURE — 84300 ASSAY OF URINE SODIUM: CPT | Performed by: INTERNAL MEDICINE

## 2024-12-20 PROCEDURE — 83036 HEMOGLOBIN GLYCOSYLATED A1C: CPT | Performed by: STUDENT IN AN ORGANIZED HEALTH CARE EDUCATION/TRAINING PROGRAM

## 2024-12-20 PROCEDURE — 82728 ASSAY OF FERRITIN: CPT

## 2024-12-20 PROCEDURE — 81001 URINALYSIS AUTO W/SCOPE: CPT | Performed by: INTERNAL MEDICINE

## 2024-12-20 PROCEDURE — 86364 TISS TRNSGLTMNASE EA IG CLAS: CPT | Performed by: INTERNAL MEDICINE

## 2024-12-20 PROCEDURE — 83540 ASSAY OF IRON: CPT

## 2024-12-20 PROCEDURE — 99232 SBSQ HOSP IP/OBS MODERATE 35: CPT | Performed by: INTERNAL MEDICINE

## 2024-12-20 PROCEDURE — 80053 COMPREHEN METABOLIC PANEL: CPT | Performed by: INTERNAL MEDICINE

## 2024-12-20 PROCEDURE — 82570 ASSAY OF URINE CREATININE: CPT | Performed by: INTERNAL MEDICINE

## 2024-12-20 PROCEDURE — 83550 IRON BINDING TEST: CPT

## 2024-12-20 PROCEDURE — 85025 COMPLETE CBC W/AUTO DIFF WBC: CPT | Performed by: INTERNAL MEDICINE

## 2024-12-20 RX ORDER — POTASSIUM CHLORIDE 1500 MG/1
20 TABLET, EXTENDED RELEASE ORAL ONCE
Status: COMPLETED | OUTPATIENT
Start: 2024-12-20 | End: 2024-12-20

## 2024-12-20 RX ADMIN — PRAVASTATIN SODIUM 40 MG: 40 TABLET ORAL at 17:44

## 2024-12-20 RX ADMIN — ALPRAZOLAM 1 MG: 0.5 TABLET ORAL at 22:13

## 2024-12-20 RX ADMIN — ALBUTEROL SULFATE 2 PUFF: 90 AEROSOL, METERED RESPIRATORY (INHALATION) at 22:18

## 2024-12-20 RX ADMIN — FUROSEMIDE 40 MG: 10 INJECTION, SOLUTION INTRAMUSCULAR; INTRAVENOUS at 08:29

## 2024-12-20 RX ADMIN — HEPARIN SODIUM 5000 UNITS: 5000 INJECTION, SOLUTION INTRAVENOUS; SUBCUTANEOUS at 17:44

## 2024-12-20 RX ADMIN — ALBUTEROL SULFATE 2 PUFF: 90 AEROSOL, METERED RESPIRATORY (INHALATION) at 08:29

## 2024-12-20 RX ADMIN — ALBUTEROL SULFATE 2 PUFF: 90 AEROSOL, METERED RESPIRATORY (INHALATION) at 12:43

## 2024-12-20 RX ADMIN — PANTOPRAZOLE SODIUM 40 MG: 40 TABLET, DELAYED RELEASE ORAL at 05:47

## 2024-12-20 RX ADMIN — EMPAGLIFLOZIN 10 MG: 10 TABLET, FILM COATED ORAL at 08:29

## 2024-12-20 RX ADMIN — POTASSIUM CHLORIDE 20 MEQ: 1500 TABLET, EXTENDED RELEASE ORAL at 08:29

## 2024-12-20 RX ADMIN — METOPROLOL SUCCINATE 25 MG: 25 TABLET, FILM COATED, EXTENDED RELEASE ORAL at 08:29

## 2024-12-20 RX ADMIN — HEPARIN SODIUM 5000 UNITS: 5000 INJECTION, SOLUTION INTRAVENOUS; SUBCUTANEOUS at 22:17

## 2024-12-20 RX ADMIN — ALBUTEROL SULFATE 2 PUFF: 90 AEROSOL, METERED RESPIRATORY (INHALATION) at 17:46

## 2024-12-20 RX ADMIN — ASPIRIN 81 MG CHEWABLE TABLET 81 MG: 81 TABLET CHEWABLE at 08:29

## 2024-12-20 RX ADMIN — HEPARIN SODIUM 5000 UNITS: 5000 INJECTION, SOLUTION INTRAVENOUS; SUBCUTANEOUS at 05:47

## 2024-12-20 RX ADMIN — FUROSEMIDE 40 MG: 10 INJECTION, SOLUTION INTRAMUSCULAR; INTRAVENOUS at 17:44

## 2024-12-20 NOTE — PLAN OF CARE
Problem: PAIN - ADULT  Goal: Verbalizes/displays adequate comfort level or baseline comfort level  Description: Interventions:  - Encourage patient to monitor pain and request assistance  - Assess pain using appropriate pain scale  - Administer analgesics based on type and severity of pain and evaluate response  - Implement non-pharmacological measures as appropriate and evaluate response  - Consider cultural and social influences on pain and pain management  - Notify physician/advanced practitioner if interventions unsuccessful or patient reports new pain  Outcome: Progressing     Problem: INFECTION - ADULT  Goal: Absence or prevention of progression during hospitalization  Description: INTERVENTIONS:  - Assess and monitor for signs and symptoms of infection  - Monitor lab/diagnostic results  - Monitor all insertion sites, i.e. indwelling lines, tubes, and drains  - Monitor endotracheal if appropriate and nasal secretions for changes in amount and color  - [place] appropriate cooling/warming therapies per order  - Administer medications as ordered  - Instruct and encourage patient and family to use good hand hygiene technique  - Identify and instruct in appropriate isolation precautions for identified infection/condition  Outcome: Progressing  Goal: Absence of fever/infection during neutropenic period  Description: INTERVENTIONS:  - Monitor WBC    Outcome: Progressing     Problem: SAFETY ADULT  Goal: Patient will remain free of falls  Description: INTERVENTIONS:  - Educate patient/family on patient safety including physical limitations  - Instruct patient to call for assistance with activity   - Consult OT/PT to assist with strengthening/mobility   - Keep Call bell within reach  - Keep bed low and locked with side rails adjusted as appropriate  - Keep care items and personal belongings within reach  - Initiate and maintain comfort rounds  - Make Fall Risk Sign visible to staff  - Offer Toileting every 2 Hours,  in advance of need  - Initiate/Maintain bed alarm  - Obtain necessary fall risk management equipment:   - Apply yellow socks and bracelet for high fall risk patients  - Consider moving patient to room near nurses station  Outcome: Progressing     Problem: DISCHARGE PLANNING  Goal: Discharge to home or other facility with appropriate resources  Description: INTERVENTIONS:  - Identify barriers to discharge w/patient and caregiver  - Arrange for needed discharge resources and transportation as appropriate  - Identify discharge learning needs (meds, wound care, etc.)  - Arrange for interpretive services to assist at discharge as needed  - Refer to Case Management Department for coordinating discharge planning if the patient needs post-hospital services based on physician/advanced practitioner order or complex needs related to functional status, cognitive ability, or social support system  Outcome: Progressing

## 2024-12-20 NOTE — ASSESSMENT & PLAN NOTE
Creatinine up until October was from 0.8-1.1, recently his creatinine is 1.4, yesterday creatinine 1.67 and today 1.52  Patient clinically appears euvolemic, he has AL with hyponatremia   Increasing creatinine could be medication induced and intravascular volume depletion, he continued Lasix 40 mg daily, he was started on valsartan a few days ago.  Had poor oral intake in the past few days  He is also on spironolactone and Jardiance  He received a bolus of 250 cc in the ED and tolerated well  Gave Plasma-Lyte 50 cc/h for 500 cc  Suspect cardio-renal syndrome  Check urine studies, bladder scan, retention protocol  Monitor daily BMP

## 2024-12-20 NOTE — PROGRESS NOTES
"  Heart Failure/ Pulmonary Hypertension Progress Note - Martell Mejía 49 y.o. male MRN: [id number]    Unit/Bed#: [facility] 421-01 Encounter: [id number]      Assessment:    Principal Problem:    Acute on Chronic systolic heart failure (HCC)  Active Problems:    Anxiety    Benign essential hypertension    Hypercholesterolemia    Gastroesophageal reflux disease with esophagitis    Acute kidney injury (HCC)    Hyponatremia    Transaminitis    Dyspnea on exertion      Subjective:   Patient seen and examined.  No significant events overnight. Feels better but still with SOB. Chest feels \"congested\"    Objective:   Intake/ Output: 680/4625/-3.9 L  Weight: 208 lbs down from around 215  Tele: SR, frequent PVCs, NSVT  MAPs: 75-85    Acute on chronic HFrEF, LVEF 15%  -Etiology Unclear. Prior  infarct noted on NPI but felt to be largely NICM  -warm on exam. JVP still elevated but improving.   -Will continue with diuresis today.   -hold off on addition of GDMT given soft BP and need for more diuresis.   -Need to aggressively replace lytes given ventricular arrhythmias  -please continue strict I/Os, daily standing weights, BMP    Neurohormonal Blockade:  --Beta-Blocker: Toprol XL 25 mg QD  --ACEi, ARB or ARNi: Valsartan 40 QD (on Hold)  --Aldosterone Receptor Blocker: Aldactone 12.5 mg QD (on Hold)  --SGLT2i: Jardiance 10 mg QD  --Diuretic:   Outpatient: Lasix 40mg QD PO    Inpatient: Lasix 40 mg IV BID     Sudden Cardiac Death Risk Reduction:  --ICD: LVEF <20% Lifevest from [facility]     Electrical Resynchronization:  --Candidacy for BiV device: Will continue to monitor     Advanced Therapies: Will continue to monitor.   --Inotrope:   --LVAD/Transplant Candidacy: THC+     TTE 12/19/24: LVEF 15%, LVIDd 5.8 cm, RV function reduced, mild to mod TR, mild MR, est RAP 10 mmHg,     11/21/2024 nuclear pharmacologic stress test performed at [facility]:  There is a fixed, large size, severe intensity perfusion defect in the basal to apical inferior, " "inferoseptal and inferolateral wall segments, which does not improve with pronation. In the presence of akinesis, this is suggestive of infarct.    11/26/2024 TTE at [facility]:  LVEF \"less than 20%\", global hypokinesis, indeterminate diastolic function.  No evidence of apical thrombus with Definity contrast.  R, mildly dilated, systolic function moderately reduced.  Moderately dilated LA, mildly dilated RA.  Trace MR, mild TR, mild ME  Right atrial pressure estimated at 5 to 10 mmHg    2/25/2013 TTE at [facility]:  LVEF 60%, normal RV and atria    Acute kidney injury (HCC)  Baseline seems to be 0.9-1.0 however over the past 2 months lingering 1.40s  Consider multifactorial possibly CRS due to low CO ?  Slight improvement with diuretics    Hyponatremia  Unclear etiology initially thougth to be 2/2 poor po intake however did improved after diuretics then decrease again.    Benign essential hypertension  Hx HTN previously on Benicar  Blood Pressure: 118/83  Acceptable range  On lasix, Toprol  Holding ACE/ARB/ARNI consider start if BP allows downstream    Transaminitis  Likely 2/2 congestive hepatopathy  On Pravachol continue monitoring    Hypercholesterolemia  On Pravachol    Anxiety  Per primary    Gastroesophageal reflux disease with esophagitis    Review of Systems   All other systems reviewed and are negative.       Central Line (day, reason):  Arrington catheter (day, reason):    Vitals: Blood pressure 103/78, pulse 80, temperature 97.7 °F (36.5 °C), temperature source Oral, resp. rate 17, height 5' 10\" (1.778 m), weight 96 kg (211 lb 10.3 oz), SpO2 (!) 88%., Body mass index is 30.37 kg/m²., I/O last 3 completed shifts:  In: 520 [P.O.:520]  Out: 2900 [Urine:2700; Stool:200]  I/O this shift:  In: 180 [P.O.:180]  Out: 3150 [Urine:3150]  Wt Readings from Last 3 Encounters:   12/20/24 96 kg (211 lb 10.3 oz)   09/24/24 99.9 kg (220 lb 3.2 oz)   02/27/24 103 kg (227 lb 3.2 oz)       Intake/Output Summary (Last 24 hours) at 12/20/2024 " 1614  Last data filed at 12/20/2024 1546  Gross per 24 hour   Intake 180 ml   Output 3350 ml   Net -3170 ml     I/O last 3 completed shifts:  In: 520 [P.O.:520]  Out: 2900 [Urine:2700; Stool:200]        Physical Exam:  Vitals:    12/20/24 0734 12/20/24 1100 12/20/24 1533 12/20/24 1533   BP: 118/83 119/81 103/78 103/78   BP Location: Right arm Right arm Left arm    Pulse: 98   80   Resp:   17 17   Temp: (!) 97.2 °F (36.2 °C) 97.8 °F (36.6 °C) 97.7 °F (36.5 °C)    TempSrc: Oral Oral Oral    SpO2: 98%   (!) 88%   Weight:       Height:           GEN: Martell Mejía appears well, alert and oriented x 3, pleasant and cooperative   HEENT: pupils equal, round, and reactive to light; extraocular muscles intact  NECK: supple, no carotid bruits   HEART: regular rhythm, normal S1 and S2, no murmurs, clicks, gallops or rubs, JVP is at mid neck   LUNGS: clear to auscultation bilaterally; no wheezes, rales, or rhonchi   ABDOMEN: normal bowel sounds, soft, no tenderness, no distention  EXTREMITIES: peripheral pulses normal; no clubbing, cyanosis, trace BLLE edema  NEURO: no focal findings   SKIN: normal without suspicious lesions on exposed skin      Current Facility-Administered Medications:     albuterol (PROVENTIL HFA,VENTOLIN HFA) inhaler 2 puff, 2 puff, Inhalation, 4x Daily, Nichole Meek MD, 2 puff at 12/20/24 1243    ALPRAZolam (XANAX) tablet 1 mg, 1 mg, Oral, TID PRN, Nichole Meek MD, 1 mg at 12/19/24 1742    aspirin chewable tablet 81 mg, 81 mg, Oral, Daily, Nichole Meek MD, 81 mg at 12/20/24 0829    Empagliflozin (JARDIANCE) tablet 10 mg, 10 mg, Oral, QAM, Nichole Meek MD, 10 mg at 12/20/24 0829    furosemide (LASIX) injection 40 mg, 40 mg, Intravenous, BID (diuretic), [place] Brown MD, 40 mg at 12/20/24 0829    heparin (porcine) subcutaneous injection 5,000 Units, 5,000 Units, Subcutaneous, Q8H ALEJANDRO, 5,000 Units at 12/20/24 0547 **AND** [CANCELED] Platelet count, , , Once, Nichole  MD [place]    metoprolol succinate (TOPROL-XL) 24 hr tablet 25 mg, 25 mg, Oral, Daily, Nichole Meek MD, 25 mg at 12/20/24 0829    pantoprazole (PROTONIX) EC tablet 40 mg, 40 mg, Oral, Early Morning, Nichole Meek MD, 40 mg at 12/20/24 0547    pravastatin (PRAVACHOL) tablet 40 mg, 40 mg, Oral, Daily With Dinner, Nichole Meek MD, 40 mg at 12/19/24 1542    Insert peripheral IV, , , Once **AND** sodium chloride (PF) 0.9 % injection 3 mL, 3 mL, Intravenous, Q1H PRN, Nichole Meek MD      Labs & Results:        Results from last 7 days   Lab Units 12/20/24  0552 12/19/24  0638 12/18/24  1410   WBC Thousand/uL 8.87 9.70 10.97*   HEMOGLOBIN g/dL 14.2 15.0 15.7   HEMATOCRIT % 43.9 45.6 48.9   PLATELETS Thousands/uL 219 233 290         Results from last 7 days   Lab Units 12/20/24  0552 12/19/24  0726 12/18/24  1410   POTASSIUM mmol/L 3.9 4.7 5.3   CHLORIDE mmol/L 93* 91* 93*   CO2 mmol/L 23 29 21   BUN mg/dL 31* 30* 29*   CREATININE mg/dL 1.53* 1.52* 1.67*   CALCIUM mg/dL 8.8 9.5 8.9   ALK PHOS U/L 136* 126* 132*   ALT U/L 91* 98* 87*   AST U/L 54* 68* 63*           Kim GUPTA

## 2024-12-20 NOTE — PROGRESS NOTES
Progress Note - Hospitalist   Name: Martell Mejía 49 y.o. male I MRN: [id number]  [facility] I Date of Admission: 12/18/2024   Date of Service: 12/20/2024 I Hospital Day: 2    Assessment & Plan  Acute on Chronic systolic heart failure (HCC)  Wt Readings from Last 3 Encounters:   12/20/24 96 kg (211 lb 10.3 oz)   09/24/24 99.9 kg (220 lb 3.2 oz)   02/27/24 103 kg (227 lb 3.2 oz)     In October patient presented to the ED with shortness of breath, found to have new onset heart failure  Echo showed ejection fraction of 20% and he has been following with [facility] since then.   Stress test showed large size, severe intensity infarct in the RCA/LCx territory   Did not have cardiac catheterization at that time but will agree to whatever testing is recommended now  Patient presents with shortness of breath on arrival (12/18/24)   Chest x-ray showed no vascular congestion but CT chest done and reviewed.   CT showed moderate pulm effusion in keeping with CHF and a RML pulmonary 3X3 cm mass--> patient aware that 3 month follow up is required   BNP was elevated at 1481, has AL and mild transaminitis.    Troponin x 2 negative, EKG without acute ischemic changes, has PVCs  In the past month medications were changed due to side effects, new medication introduced for GDMT  12/5/24 Lasix was decreased from 40 mg to 20 mg by cardiology but patient continued to take 40 mg daily, he was afraid he will develop lower extremity swelling  He was on Entresto until last week, due to cough it was switched to valsartan 40 mg daily  He is on spironolactone 12.5 mg daily, Jardiance 10 mg daily, metoprolol succinate 25 mg daily  Received a LifeVest on Friday  Appreciate cardiology input   Follow daily weight and I/O closely  Continue IV diuresis  CMR when euvolemic  Has a life vest    Dyspnea on exertion  Seems to be ongoing since October  Most likely related to ischemic cardiomyopathy  Acute kidney injury (HCC)  Creatinine up until  October was from 0.8-1.1, recently his creatinine is 1.4, yesterday creatinine 1.67 and today 1.52  Patient clinically appears euvolemic, he has AL with hyponatremia   Increasing creatinine could be medication induced and intravascular volume depletion, he continued Lasix 40 mg daily, he was started on valsartan a few days ago.  Had poor oral intake in the past few days  He is also on spironolactone and Jardiance  He received a bolus of 250 cc in the ED and tolerated well  Gave Plasma-Lyte 50 cc/h for 500 cc  Suspect cardio-renal syndrome  Check urine studies, bladder scan, retention protocol  Monitor daily BMP   Hyponatremia  Likely due to CHF  Monitor with diuresis  Check urine studies  Transaminitis  Mild transaminitis, no abdominal pain or tenderness but he feels full in the abdomen   Tolerating his diet now  Monitor Lfts - improving  Anxiety  Continue Xanax 1 mg 3 times daily as needed  PDMP reviewed  Benign essential hypertension  Continue metoprolol succinate 25 mg daily  Holding Lasix, spironolactone and valsartan due to AL and hypotension   Hypercholesterolemia  On Crestor 5 mg daily, substitute for pravastatin 40 mg daily  Gastroesophageal reflux disease with esophagitis  Continue pantoprazole 40 mg daily    VTE Pharmacologic Prophylaxis: VTE Score: 3 Moderate Risk (Score 3-4) - Pharmacological DVT Prophylaxis Ordered: heparin.    Mobility:   Basic Mobility Inpatient Raw Score: 24  JH-HLM Goal: 8: Walk 250 feet or more  JH-HLM Achieved: 7: Walk 25 feet or more  JH-HLM Goal achieved. Continue to encourage appropriate mobility.    Patient Centered Rounds: I performed bedside rounds with nursing staff today.   Discussions with Specialists or Other Care Team Provider: nurse, CM, cardiology    Current Length of Stay: 2 day(s)  Current Patient Status: Inpatient   Certification Statement: The patient will continue to require additional inpatient hospital stay due to diuresis  Discharge Plan: Anticipate  discharge in 48-72 hrs to home.    Code Status: Level 1 - Full Code    Subjective   Reports that his appetite is improved and is asking for large portions. Breathing improved.    Objective :  Temp:  [97 °F (36.1 °C)-98 °F (36.7 °C)] 97.8 °F (36.6 °C)  HR:  [] 98  BP: (103-134)/(66-89) 119/81  Resp:  [17-18] 17  SpO2:  [98 %-100 %] 98 %  O2 Device: None (Room air)    Body mass index is 30.37 kg/m².     Input and Output Summary (last 24 hours):     Intake/Output Summary (Last 24 hours) at 12/20/2024 1447  Last data filed at 12/20/2024 1100  Gross per 24 hour   Intake 180 ml   Output 2850 ml   Net -2670 ml       Physical Exam  Constitutional:       Appearance: Normal appearance.   HENT:      Nose: Nose normal.   Eyes:      Extraocular Movements: Extraocular movements intact.   Cardiovascular:      Rate and Rhythm: Normal rate and regular rhythm.   Pulmonary:      Effort: Pulmonary effort is normal.      Breath sounds: Rales present. No wheezing.   Musculoskeletal:      Right lower leg: No edema.      Left lower leg: No edema.   Skin:     General: Skin is warm and dry.   Neurological:      Mental Status: He is alert and oriented to person, place, and time.   Psychiatric:         Mood and Affect: Mood normal.         Behavior: Behavior normal.           Lines/Drains:        Telemetry:  Telemetry Orders (From admission, onward)               24 Hour Telemetry Monitoring  Continuous x 24 Hours (Telem)        Expiring   Question:  Reason for 24 Hour Telemetry  Answer:  Decompensated CHF- and any one of the following: continuous diuretic infusion or total diuretic dose >200 mg daily, associated electrolyte derangement (I.e. K < 3.0), inotropic drip (continuous infusion), hx of ventricular arrhythmia, or new EF < 35%        LifeVest Patient: Continuous Telemetry Monitoring during hospitalization (non-expiring)  Continuous LifeVest Telemetry Monitoring        References:    LifeVest Policy                     Telemetry  Reviewed: Normal Sinus Rhythm  Indication for Continued Telemetry Use: Acute CHF on >200 mg lasix/day or equivalent dose or with new reduced EF.                Lab Results: I have reviewed the following results:   Results from last 7 days   Lab Units 12/20/24  0552   WBC Thousand/uL 8.87   HEMOGLOBIN g/dL 14.2   HEMATOCRIT % 43.9   PLATELETS Thousands/uL 219   SEGS PCT % 62   LYMPHO PCT % 25   MONO PCT % 8   EOS PCT % 3     Results from last 7 days   Lab Units 12/20/24  0552   SODIUM mmol/L 127*   POTASSIUM mmol/L 3.9   CHLORIDE mmol/L 93*   CO2 mmol/L 23   BUN mg/dL 31*   CREATININE mg/dL 1.53*   ANION GAP mmol/L 11   CALCIUM mg/dL 8.8   ALBUMIN g/dL 3.8   TOTAL BILIRUBIN mg/dL 1.06*   ALK PHOS U/L 136*   ALT U/L 91*   AST U/L 54*   GLUCOSE RANDOM mg/dL 111             Results from last 7 days   Lab Units 12/20/24  0552   HEMOGLOBIN A1C % 6.5*           Recent Cultures (last 7 days):         Imaging Results Review: No pertinent imaging studies reviewed.  Other Study Results Review: No additional pertinent studies reviewed.    Last 24 Hours Medication List:     Current Facility-Administered Medications:     albuterol (PROVENTIL HFA,VENTOLIN HFA) inhaler 2 puff, 4x Daily    ALPRAZolam (XANAX) tablet 1 mg, TID PRN    aspirin chewable tablet 81 mg, Daily    Empagliflozin (JARDIANCE) tablet 10 mg, QAM    furosemide (LASIX) injection 40 mg, BID (diuretic)    heparin (porcine) subcutaneous injection 5,000 Units, Q8H ALEJANDRO **AND** [CANCELED] Platelet count, Once    metoprolol succinate (TOPROL-XL) 24 hr tablet 25 mg, Daily    pantoprazole (PROTONIX) EC tablet 40 mg, Early Morning    pravastatin (PRAVACHOL) tablet 40 mg, Daily With Dinner    Insert peripheral IV, Once **AND** sodium chloride (PF) 0.9 % injection 3 mL, Q1H PRN    Administrative Statements   Today, Patient Was Seen By: Tahir Kumar MD  I have spent a total time of 40 minutes in caring for this patient on the day of the visit/encounter including Diagnostic  results, Instructions for management, Patient and family education, Impressions, Counseling / Coordination of care, Documenting in the medical record, Reviewing / ordering tests, medicine, procedures  , Obtaining or reviewing history  , and Communicating with other healthcare professionals .    **Please Note: This note may have been constructed using a voice recognition system.**

## 2024-12-20 NOTE — ASSESSMENT & PLAN NOTE
Hx HTN previously on Benicar  Blood Pressure: 118/83  Acceptable range  On lasix, Toprol  Holding ACE/ARB/ARNI consider start if BP allows downstream

## 2024-12-20 NOTE — ASSESSMENT & PLAN NOTE
"Likely 2/2 CHF exacerbation. Imagine with questionable lesion\" mass like appearance will need follow up outpatient   Per primary  "

## 2024-12-20 NOTE — ASSESSMENT & PLAN NOTE
Mild transaminitis, no abdominal pain or tenderness but he feels full in the abdomen   Tolerating his diet now  Monitor Lfts - improving

## 2024-12-20 NOTE — ASSESSMENT & PLAN NOTE
Unclear etiology initially thougth to be 2/2 poor po intake however did improved after diuretics then decrease again.  Managed by primary

## 2024-12-20 NOTE — ASSESSMENT & PLAN NOTE
Wt Readings from Last 3 Encounters:   12/20/24 96 kg (211 lb 10.3 oz)   09/24/24 99.9 kg (220 lb 3.2 oz)   02/27/24 103 kg (227 lb 3.2 oz)     In October patient presented to the ED with shortness of breath, found to have new onset heart failure  Echo showed ejection fraction of 20% and he has been following with [facility] since then.   Stress test showed large size, severe intensity infarct in the RCA/LCx territory   Did not have cardiac catheterization at that time but will agree to whatever testing is recommended now  Patient presents with shortness of breath on arrival (12/18/24)   Chest x-ray showed no vascular congestion but CT chest done and reviewed.   CT showed moderate pulm effusion in keeping with CHF and a RML pulmonary 3X3 cm mass--> patient aware that 3 month follow up is required   BNP was elevated at 1481, has AL and mild transaminitis.    Troponin x 2 negative, EKG without acute ischemic changes, has PVCs  In the past month medications were changed due to side effects, new medication introduced for GDMT  12/5/24 Lasix was decreased from 40 mg to 20 mg by cardiology but patient continued to take 40 mg daily, he was afraid he will develop lower extremity swelling  He was on Entresto until last week, due to cough it was switched to valsartan 40 mg daily  He is on spironolactone 12.5 mg daily, Jardiance 10 mg daily, metoprolol succinate 25 mg daily  Received a LifeVest on Friday  Appreciate cardiology input   Follow daily weight and I/O closely  Continue IV diuresis  CMR when euvolemic  Has a life vest

## 2024-12-21 LAB
ANION GAP SERPL CALCULATED.3IONS-SCNC: 11 MMOL/L (ref 4–13)
BUN SERPL-MCNC: 31 MG/DL (ref 5–25)
CALCIUM SERPL-MCNC: 9 MG/DL (ref 8.4–10.2)
CHLORIDE SERPL-SCNC: 93 MMOL/L (ref 96–108)
CO2 SERPL-SCNC: 23 MMOL/L (ref 21–32)
CREAT SERPL-MCNC: 1.42 MG/DL (ref 0.6–1.3)
GFR SERPL CREATININE-BSD FRML MDRD: 57 ML/MIN/1.73SQ M
GLUCOSE SERPL-MCNC: 142 MG/DL (ref 65–140)
MAGNESIUM SERPL-MCNC: 2.1 MG/DL (ref 1.9–2.7)
POTASSIUM SERPL-SCNC: 3.6 MMOL/L (ref 3.5–5.3)
SODIUM SERPL-SCNC: 127 MMOL/L (ref 135–147)

## 2024-12-21 PROCEDURE — 99233 SBSQ HOSP IP/OBS HIGH 50: CPT | Performed by: INTERNAL MEDICINE

## 2024-12-21 PROCEDURE — 80048 BASIC METABOLIC PNL TOTAL CA: CPT | Performed by: NURSE PRACTITIONER

## 2024-12-21 PROCEDURE — 99232 SBSQ HOSP IP/OBS MODERATE 35: CPT | Performed by: NURSE PRACTITIONER

## 2024-12-21 PROCEDURE — 83735 ASSAY OF MAGNESIUM: CPT | Performed by: NURSE PRACTITIONER

## 2024-12-21 RX ORDER — FUROSEMIDE 10 MG/ML
40 INJECTION INTRAMUSCULAR; INTRAVENOUS
Status: DISCONTINUED | OUTPATIENT
Start: 2024-12-21 | End: 2024-12-23

## 2024-12-21 RX ORDER — POTASSIUM CHLORIDE 1500 MG/1
20 TABLET, EXTENDED RELEASE ORAL 2 TIMES DAILY
Status: DISCONTINUED | OUTPATIENT
Start: 2024-12-21 | End: 2024-12-21

## 2024-12-21 RX ORDER — METOPROLOL SUCCINATE 25 MG/1
25 TABLET, EXTENDED RELEASE ORAL DAILY
Status: DISCONTINUED | OUTPATIENT
Start: 2024-12-22 | End: 2024-12-22

## 2024-12-21 RX ORDER — POTASSIUM CHLORIDE 1500 MG/1
20 TABLET, EXTENDED RELEASE ORAL 2 TIMES DAILY
Status: COMPLETED | OUTPATIENT
Start: 2024-12-21 | End: 2024-12-23

## 2024-12-21 RX ORDER — POTASSIUM CHLORIDE 1500 MG/1
40 TABLET, EXTENDED RELEASE ORAL ONCE
Status: COMPLETED | OUTPATIENT
Start: 2024-12-21 | End: 2024-12-21

## 2024-12-21 RX ADMIN — HEPARIN SODIUM 5000 UNITS: 5000 INJECTION, SOLUTION INTRAVENOUS; SUBCUTANEOUS at 13:00

## 2024-12-21 RX ADMIN — FUROSEMIDE 40 MG: 10 INJECTION, SOLUTION INTRAMUSCULAR; INTRAVENOUS at 21:47

## 2024-12-21 RX ADMIN — ASPIRIN 81 MG CHEWABLE TABLET 81 MG: 81 TABLET CHEWABLE at 08:36

## 2024-12-21 RX ADMIN — FUROSEMIDE 40 MG: 10 INJECTION, SOLUTION INTRAMUSCULAR; INTRAVENOUS at 12:57

## 2024-12-21 RX ADMIN — ALBUTEROL SULFATE 2 PUFF: 90 AEROSOL, METERED RESPIRATORY (INHALATION) at 21:47

## 2024-12-21 RX ADMIN — POTASSIUM CHLORIDE 40 MEQ: 1500 TABLET, EXTENDED RELEASE ORAL at 12:58

## 2024-12-21 RX ADMIN — POTASSIUM CHLORIDE 20 MEQ: 1500 TABLET, EXTENDED RELEASE ORAL at 10:36

## 2024-12-21 RX ADMIN — METOPROLOL SUCCINATE 25 MG: 25 TABLET, FILM COATED, EXTENDED RELEASE ORAL at 09:53

## 2024-12-21 RX ADMIN — HEPARIN SODIUM 5000 UNITS: 5000 INJECTION, SOLUTION INTRAVENOUS; SUBCUTANEOUS at 21:47

## 2024-12-21 RX ADMIN — ALBUTEROL SULFATE 2 PUFF: 90 AEROSOL, METERED RESPIRATORY (INHALATION) at 12:57

## 2024-12-21 RX ADMIN — PANTOPRAZOLE SODIUM 40 MG: 40 TABLET, DELAYED RELEASE ORAL at 07:03

## 2024-12-21 RX ADMIN — EMPAGLIFLOZIN 10 MG: 10 TABLET, FILM COATED ORAL at 08:37

## 2024-12-21 RX ADMIN — PRAVASTATIN SODIUM 40 MG: 40 TABLET ORAL at 17:37

## 2024-12-21 RX ADMIN — HEPARIN SODIUM 5000 UNITS: 5000 INJECTION, SOLUTION INTRAVENOUS; SUBCUTANEOUS at 07:03

## 2024-12-21 RX ADMIN — POTASSIUM CHLORIDE 20 MEQ: 1500 TABLET, EXTENDED RELEASE ORAL at 17:37

## 2024-12-21 RX ADMIN — ALBUTEROL SULFATE 2 PUFF: 90 AEROSOL, METERED RESPIRATORY (INHALATION) at 17:37

## 2024-12-21 RX ADMIN — ALBUTEROL SULFATE 2 PUFF: 90 AEROSOL, METERED RESPIRATORY (INHALATION) at 08:35

## 2024-12-21 NOTE — PROGRESS NOTES
Progress Note - Hospitalist   Name: Martell Mejía 49 y.o. male I MRN: [id number]  Unit/Bed#: [facility] 421-01 I Date of Admission: 12/18/2024   Date of Service: 12/21/2024 I Hospital Day: 3    Assessment & Plan  Acute on Chronic systolic heart failure (HCC)  Wt Readings from Last 3 Encounters:   12/21/24 94.7 kg (208 lb 12.4 oz)   09/24/24 99.9 kg (220 lb 3.2 oz)   02/27/24 103 kg (227 lb 3.2 oz)     In October patient presented to the ED with shortness of breath, found to have new onset heart failure  Echo showed ejection fraction of 20% and he has been following with [facility] since then.   Stress test showed large size, severe intensity infarct in the RCA/LCx territory   Did not have cardiac catheterization at that time but will agree to whatever testing is recommended now  Patient presents with shortness of breath on arrival (12/18/24)   Chest x-ray showed no vascular congestion but CT chest done and reviewed.   CT showed moderate pulm effusion in keeping with CHF and a RML pulmonary 3X3 cm mass--> patient aware that 3 month follow up is required   BNP was elevated at 1481, has AL and mild transaminitis.    Troponin x 2 negative, EKG without acute ischemic changes, has PVCs  In the past month medications were changed due to side effects, new medication introduced for GDMT  12/5/24 Lasix was decreased from 40 mg to 20 mg by cardiology but patient continued to take 40 mg daily, he was afraid he will develop lower extremity swelling  He was on Entresto until last week, due to cough it was switched to valsartan 40 mg daily  He is on spironolactone 12.5 mg daily, Jardiance 10 mg daily, metoprolol succinate 25 mg daily  Received a LifeVest on Friday  Follow daily weight and I/O closely  Continue IV diuresis  CMR when euvolemic  BMP daily    Dyspnea on exertion  Seems to be ongoing since October  Most likely related to ischemic cardiomyopathy  Acute kidney injury (HCC)  Creatinine up until October was from 0.8-1.1  Increasing  creatinine could be medication induced and intravascular volume depletion vs cardiorenal syndrome  Suspect cardio-renal syndrome  FeNa is consistent with prerenal/HF  No casts in urine  Monitor daily BMP   Hyponatremia  Likely due to CHF  Monitor with diuresis  Transaminitis  Mild transaminitis, due to CHF  Improving with diuresis  Anxiety  Continue Xanax 1 mg 3 times daily as needed  PDMP reviewed  Benign essential hypertension  Continue metoprolol succinate 25 mg daily  Holding ACEI/ARB due to elevated creatinine  Hypercholesterolemia  On Crestor 5 mg daily, substitute for pravastatin 40 mg daily  Gastroesophageal reflux disease with esophagitis  Continue pantoprazole 40 mg daily    VTE Pharmacologic Prophylaxis: VTE Score: 3 Moderate Risk (Score 3-4) - Pharmacological DVT Prophylaxis Ordered: heparin.    Mobility:   Basic Mobility Inpatient Raw Score: 24  JH-HLM Goal: 8: Walk 250 feet or more  JH-HLM Achieved: 7: Walk 25 feet or more  JH-HLM Goal NOT achieved. Continue with multidisciplinary rounding and encourage appropriate mobility to improve upon JH-HLM goals.    Patient Centered Rounds: I performed bedside rounds with nursing staff today.   Discussions with Specialists or Other Care Team Provider: nurse, CM, cardiology    Education and Discussions with Family / Patient: Updated  (mother) at bedside.    Current Length of Stay: 3 day(s)  Current Patient Status: Inpatient   Certification Statement: The patient will continue to require additional inpatient hospital stay due to diuresis, awaiting CMR  Discharge Plan: Anticipate discharge in 48-72 hrs to home.    Code Status: Level 1 - Full Code    Subjective   Reports that he is sleeping better and breathing is improved.    Objective :  Temp:  [97.2 °F (36.2 °C)-98.1 °F (36.7 °C)] 97.4 °F (36.3 °C)  HR:  [62-94] 91  BP: ()/(63-84) 107/73  SpO2:  [88 %-98 %] 98 %  O2 Device: None (Room air)    Body mass index is 29.96 kg/m².     Input and  Output Summary (last 24 hours):     Intake/Output Summary (Last 24 hours) at 12/21/2024 1628  Last data filed at 12/21/2024 0840  Gross per 24 hour   Intake 760 ml   Output 1675 ml   Net -915 ml       Physical Exam  Constitutional:       Appearance: Normal appearance.   HENT:      Head: Normocephalic and atraumatic.      Nose: Nose normal.   Eyes:      Extraocular Movements: Extraocular movements intact.   Cardiovascular:      Rate and Rhythm: Normal rate and regular rhythm.   Pulmonary:      Effort: Pulmonary effort is normal.   Neurological:      Mental Status: He is alert and oriented to person, place, and time.   Psychiatric:         Mood and Affect: Mood normal.         Behavior: Behavior normal.           Lines/Drains:        Telemetry:  Telemetry Orders (From admission, onward)               LifeVest Patient: Continuous Telemetry Monitoring during hospitalization (non-expiring)  Continuous LifeVest Telemetry Monitoring        References:    LifeVest Policy                     Telemetry Reviewed: Normal Sinus Rhythm  Indication for Continued Telemetry Use: Acute CHF on >200 mg lasix/day or equivalent dose or with new reduced EF.                Lab Results: I have reviewed the following results:   Results from last 7 days   Lab Units 12/20/24  0552   WBC Thousand/uL 8.87   HEMOGLOBIN g/dL 14.2   HEMATOCRIT % 43.9   PLATELETS Thousands/uL 219   SEGS PCT % 62   LYMPHO PCT % 25   MONO PCT % 8   EOS PCT % 3     Results from last 7 days   Lab Units 12/21/24  0957 12/20/24  0552   SODIUM mmol/L 127* 127*   POTASSIUM mmol/L 3.6 3.9   CHLORIDE mmol/L 93* 93*   CO2 mmol/L 23 23   BUN mg/dL 31* 31*   CREATININE mg/dL 1.42* 1.53*   ANION GAP mmol/L 11 11   CALCIUM mg/dL 9.0 8.8   ALBUMIN g/dL  --  3.8   TOTAL BILIRUBIN mg/dL  --  1.06*   ALK PHOS U/L  --  136*   ALT U/L  --  91*   AST U/L  --  54*   GLUCOSE RANDOM mg/dL 142* 111             Results from last 7 days   Lab Units 12/20/24  0552   HEMOGLOBIN A1C % 6.5*            Recent Cultures (last 7 days):         Imaging Results Review: No pertinent imaging studies reviewed.  Other Study Results Review: No additional pertinent studies reviewed.    Last 24 Hours Medication List:     Current Facility-Administered Medications:     albuterol (PROVENTIL HFA,VENTOLIN HFA) inhaler 2 puff, 4x Daily    ALPRAZolam (XANAX) tablet 1 mg, TID PRN    aspirin chewable tablet 81 mg, Daily    Empagliflozin (JARDIANCE) tablet 10 mg, QAM    furosemide (LASIX) injection 40 mg, BID (diuretic)    heparin (porcine) subcutaneous injection 5,000 Units, Q8H ALEJANDRO **AND** [CANCELED] Platelet count, Once    [START ON 12/22/2024] metoprolol succinate (TOPROL-XL) 24 hr tablet 25 mg, Daily    pantoprazole (PROTONIX) EC tablet 40 mg, Early Morning    potassium chloride (Klor-Con M20) CR tablet 20 mEq, BID    pravastatin (PRAVACHOL) tablet 40 mg, Daily With Dinner    Insert peripheral IV, Once **AND** sodium chloride (PF) 0.9 % injection 3 mL, Q1H PRN    Administrative Statements   Today, Patient Was Seen By: Tahir Kumar MD  I have spent a total time of 60 minutes in caring for this patient on the day of the visit/encounter including Diagnostic results, Instructions for management, Patient and family education, Impressions, Counseling / Coordination of care, Documenting in the medical record, Reviewing / ordering tests, medicine, procedures  , Obtaining or reviewing history  , Communicating with other healthcare professionals , and preparing [place] paperwork for the patient.    **Please Note: This note may have been constructed using a voice recognition system.**

## 2024-12-21 NOTE — ASSESSMENT & PLAN NOTE
Creatinine up until October was from 0.8-1.1  Increasing creatinine could be medication induced and intravascular volume depletion vs cardiorenal syndrome  Suspect cardio-renal syndrome  FeNa is consistent with prerenal/HF  No casts in urine  Monitor daily BMP

## 2024-12-21 NOTE — PLAN OF CARE
Problem: PAIN - ADULT  Goal: Verbalizes/displays adequate comfort level or baseline comfort level  Description: Interventions:  - Encourage patient to monitor pain and request assistance  - Assess pain using appropriate pain scale  - Administer analgesics based on type and severity of pain and evaluate response  - Implement non-pharmacological measures as appropriate and evaluate response  - Consider cultural and social influences on pain and pain management  - Notify physician/advanced practitioner if interventions unsuccessful or patient reports new pain  Outcome: Progressing     Problem: INFECTION - ADULT  Goal: Absence or prevention of progression during hospitalization  Description: INTERVENTIONS:  - Assess and monitor for signs and symptoms of infection  - Monitor lab/diagnostic results  - Monitor all insertion sites, i.e. indwelling lines, tubes, and drains  - Monitor endotracheal if appropriate and nasal secretions for changes in amount and color  - [place] appropriate cooling/warming therapies per order  - Administer medications as ordered  - Instruct and encourage patient and family to use good hand hygiene technique  - Identify and instruct in appropriate isolation precautions for identified infection/condition  Outcome: Progressing  Goal: Absence of fever/infection during neutropenic period  Description: INTERVENTIONS:  - Monitor WBC    Outcome: Progressing     Problem: SAFETY ADULT  Goal: Patient will remain free of falls  Description: INTERVENTIONS:  - Educate patient/family on patient safety including physical limitations  - Instruct patient to call for assistance with activity   - Consult OT/PT to assist with strengthening/mobility   - Keep Call bell within reach  - Keep bed low and locked with side rails adjusted as appropriate  - Keep care items and personal belongings within reach  - Initiate and maintain comfort rounds  - Make Fall Risk Sign visible to staff  - Offer Toileting every 2 Hours,  in advance of need  - Initiate/Maintain bed alarm  - Apply yellow socks and bracelet for high fall risk patients  - Consider moving patient to room near nurses station  Outcome: Progressing  Goal: Maintain or return to baseline ADL function  Description: INTERVENTIONS:  -  Assess patient's ability to carry out ADLs; assess patient's baseline for ADL function and identify physical deficits which impact ability to perform ADLs (bathing, care of mouth/teeth, toileting, grooming, dressing, etc.)  - Assess/evaluate cause of self-care deficits   - Assess range of motion  - Assess patient's mobility; develop plan if impaired  - Assess patient's need for assistive devices and provide as appropriate  - Encourage maximum independence but intervene and supervise when necessary  - Involve family in performance of ADLs  - Assess for home care needs following discharge   - Consider OT consult to assist with ADL evaluation and planning for discharge  - Provide patient education as appropriate  Outcome: Progressing  Goal: Maintains/Returns to pre admission functional level  Description: INTERVENTIONS:  - Perform AM-PAC 6 Click Basic Mobility/ Daily Activity assessment daily.  - Set and communicate daily mobility goal to care team and patient/family/caregiver.   - Collaborate with rehabilitation services on mobility goals if consulted  - Perform Range of Motion 4 times a day.  - Reposition patient every 2 hours.  - Dangle patient 4 times a day  - Stand patient 4 times a day  - Ambulate patient 4 times a day  - Out of bed to chair 4 times a day   - Out of bed for meals 4 times a day  - Out of bed for toileting  - Record patient progress and toleration of activity level   Outcome: Progressing     Problem: DISCHARGE PLANNING  Goal: Discharge to home or other facility with appropriate resources  Description: INTERVENTIONS:  - Identify barriers to discharge w/patient and caregiver  - Arrange for needed discharge resources and  transportation as appropriate  - Identify discharge learning needs (meds, wound care, etc.)  - Arrange for interpretive services to assist at discharge as needed  - Refer to Case Management Department for coordinating discharge planning if the patient needs post-hospital services based on physician/advanced practitioner order or complex needs related to functional status, cognitive ability, or social support system  Outcome: Progressing     Problem: Knowledge Deficit  Goal: Patient/family/caregiver demonstrates understanding of disease process, treatment plan, medications, and discharge instructions  Description: Complete learning assessment and assess knowledge base.  Interventions:  - Provide teaching at level of understanding  - Provide teaching via preferred learning methods  Outcome: Progressing      Xray Wrist 3 Views, Right

## 2024-12-21 NOTE — ASSESSMENT & PLAN NOTE
Wt Readings from Last 3 Encounters:   12/21/24 94.7 kg (208 lb 12.4 oz)   09/24/24 99.9 kg (220 lb 3.2 oz)   02/27/24 103 kg (227 lb 3.2 oz)     In October patient presented to the ED with shortness of breath, found to have new onset heart failure  Echo showed ejection fraction of 20% and he has been following with [facility] since then.   Stress test showed large size, severe intensity infarct in the RCA/LCx territory   Did not have cardiac catheterization at that time but will agree to whatever testing is recommended now  Patient presents with shortness of breath on arrival (12/18/24)   Chest x-ray showed no vascular congestion but CT chest done and reviewed.   CT showed moderate pulm effusion in keeping with CHF and a RML pulmonary 3X3 cm mass--> patient aware that 3 month follow up is required   BNP was elevated at 1481, has AL and mild transaminitis.    Troponin x 2 negative, EKG without acute ischemic changes, has PVCs  In the past month medications were changed due to side effects, new medication introduced for GDMT  12/5/24 Lasix was decreased from 40 mg to 20 mg by cardiology but patient continued to take 40 mg daily, he was afraid he will develop lower extremity swelling  He was on Entresto until last week, due to cough it was switched to valsartan 40 mg daily  He is on spironolactone 12.5 mg daily, Jardiance 10 mg daily, metoprolol succinate 25 mg daily  Received a LifeVest on Friday  Follow daily weight and I/O closely  Continue IV diuresis  CMR when euvolemic  BMP daily

## 2024-12-22 ENCOUNTER — APPOINTMENT (INPATIENT)
Dept: RADIOLOGY | Facility: HOSPITAL | Age: 49
DRG: 291 | End: 2024-12-22
Payer: COMMERCIAL

## 2024-12-22 LAB
ALBUMIN SERPL BCG-MCNC: 3.9 G/DL (ref 3.5–5)
ALP SERPL-CCNC: 167 U/L (ref 34–104)
ALT SERPL W P-5'-P-CCNC: 77 U/L (ref 7–52)
ANION GAP SERPL CALCULATED.3IONS-SCNC: 9 MMOL/L (ref 4–13)
AST SERPL W P-5'-P-CCNC: 39 U/L (ref 13–39)
BILIRUB SERPL-MCNC: 1.11 MG/DL (ref 0.2–1)
BUN SERPL-MCNC: 33 MG/DL (ref 5–25)
CALCIUM SERPL-MCNC: 9.1 MG/DL (ref 8.4–10.2)
CHLORIDE SERPL-SCNC: 92 MMOL/L (ref 96–108)
CO2 SERPL-SCNC: 27 MMOL/L (ref 21–32)
CREAT SERPL-MCNC: 1.6 MG/DL (ref 0.6–1.3)
GFR SERPL CREATININE-BSD FRML MDRD: 49 ML/MIN/1.73SQ M
GLUCOSE SERPL-MCNC: 95 MG/DL (ref 65–140)
POTASSIUM SERPL-SCNC: 4.3 MMOL/L (ref 3.5–5.3)
PROT SERPL-MCNC: 6 G/DL (ref 6.4–8.4)
SODIUM SERPL-SCNC: 128 MMOL/L (ref 135–147)

## 2024-12-22 PROCEDURE — A9585 GADOBUTROL INJECTION: HCPCS | Performed by: INTERNAL MEDICINE

## 2024-12-22 PROCEDURE — 75561 CARDIAC MRI FOR MORPH W/DYE: CPT

## 2024-12-22 PROCEDURE — 80053 COMPREHEN METABOLIC PANEL: CPT | Performed by: NURSE PRACTITIONER

## 2024-12-22 PROCEDURE — 99232 SBSQ HOSP IP/OBS MODERATE 35: CPT | Performed by: NURSE PRACTITIONER

## 2024-12-22 PROCEDURE — 99232 SBSQ HOSP IP/OBS MODERATE 35: CPT | Performed by: INTERNAL MEDICINE

## 2024-12-22 RX ORDER — POTASSIUM CHLORIDE 1500 MG/1
20 TABLET, EXTENDED RELEASE ORAL ONCE
Status: COMPLETED | OUTPATIENT
Start: 2024-12-22 | End: 2024-12-22

## 2024-12-22 RX ORDER — METOPROLOL SUCCINATE 25 MG/1
25 TABLET, EXTENDED RELEASE ORAL 2 TIMES DAILY
Status: DISCONTINUED | OUTPATIENT
Start: 2024-12-22 | End: 2024-12-25 | Stop reason: HOSPADM

## 2024-12-22 RX ORDER — GADOBUTROL 604.72 MG/ML
18 INJECTION INTRAVENOUS
Status: COMPLETED | OUTPATIENT
Start: 2024-12-22 | End: 2024-12-22

## 2024-12-22 RX ADMIN — POTASSIUM CHLORIDE 20 MEQ: 1500 TABLET, EXTENDED RELEASE ORAL at 16:30

## 2024-12-22 RX ADMIN — EMPAGLIFLOZIN 10 MG: 10 TABLET, FILM COATED ORAL at 09:10

## 2024-12-22 RX ADMIN — FUROSEMIDE 40 MG: 10 INJECTION, SOLUTION INTRAMUSCULAR; INTRAVENOUS at 16:26

## 2024-12-22 RX ADMIN — HEPARIN SODIUM 5000 UNITS: 5000 INJECTION, SOLUTION INTRAVENOUS; SUBCUTANEOUS at 07:40

## 2024-12-22 RX ADMIN — ALPRAZOLAM 1 MG: 0.5 TABLET ORAL at 02:56

## 2024-12-22 RX ADMIN — METOPROLOL SUCCINATE 25 MG: 25 TABLET, EXTENDED RELEASE ORAL at 17:47

## 2024-12-22 RX ADMIN — METOPROLOL SUCCINATE 25 MG: 25 TABLET, EXTENDED RELEASE ORAL at 09:08

## 2024-12-22 RX ADMIN — ALBUTEROL SULFATE 2 PUFF: 90 AEROSOL, METERED RESPIRATORY (INHALATION) at 09:10

## 2024-12-22 RX ADMIN — ALBUTEROL SULFATE 2 PUFF: 90 AEROSOL, METERED RESPIRATORY (INHALATION) at 17:54

## 2024-12-22 RX ADMIN — PANTOPRAZOLE SODIUM 40 MG: 40 TABLET, DELAYED RELEASE ORAL at 07:40

## 2024-12-22 RX ADMIN — ALBUTEROL SULFATE 2 PUFF: 90 AEROSOL, METERED RESPIRATORY (INHALATION) at 12:27

## 2024-12-22 RX ADMIN — FUROSEMIDE 40 MG: 10 INJECTION, SOLUTION INTRAMUSCULAR; INTRAVENOUS at 09:09

## 2024-12-22 RX ADMIN — POTASSIUM CHLORIDE 20 MEQ: 1500 TABLET, EXTENDED RELEASE ORAL at 17:47

## 2024-12-22 RX ADMIN — PRAVASTATIN SODIUM 40 MG: 40 TABLET ORAL at 16:30

## 2024-12-22 RX ADMIN — ASPIRIN 81 MG CHEWABLE TABLET 81 MG: 81 TABLET CHEWABLE at 09:08

## 2024-12-22 RX ADMIN — GADOBUTROL 18 ML: 604.72 INJECTION INTRAVENOUS at 21:06

## 2024-12-22 RX ADMIN — POTASSIUM CHLORIDE 20 MEQ: 1500 TABLET, EXTENDED RELEASE ORAL at 09:08

## 2024-12-22 RX ADMIN — ALPRAZOLAM 1 MG: 0.5 TABLET ORAL at 21:58

## 2024-12-22 RX ADMIN — HEPARIN SODIUM 5000 UNITS: 5000 INJECTION, SOLUTION INTRAVENOUS; SUBCUTANEOUS at 14:05

## 2024-12-22 NOTE — PLAN OF CARE
Problem: PAIN - ADULT  Goal: Verbalizes/displays adequate comfort level or baseline comfort level  Description: Interventions:  - Encourage patient to monitor pain and request assistance  - Assess pain using appropriate pain scale  - Administer analgesics based on type and severity of pain and evaluate response  - Implement non-pharmacological measures as appropriate and evaluate response  - Consider cultural and social influences on pain and pain management  - Notify physician/advanced practitioner if interventions unsuccessful or patient reports new pain  Outcome: Progressing     Problem: INFECTION - ADULT  Goal: Absence or prevention of progression during hospitalization  Description: INTERVENTIONS:  - Assess and monitor for signs and symptoms of infection  - Monitor lab/diagnostic results  - Monitor all insertion sites, i.e. indwelling lines, tubes, and drains  - Monitor endotracheal if appropriate and nasal secretions for changes in amount and color  - [place] appropriate cooling/warming therapies per order  - Administer medications as ordered  - Instruct and encourage patient and family to use good hand hygiene technique  - Identify and instruct in appropriate isolation precautions for identified infection/condition  Outcome: Progressing  Goal: Absence of fever/infection during neutropenic period  Description: INTERVENTIONS:  - Monitor WBC    Outcome: Progressing     Problem: SAFETY ADULT  Goal: Patient will remain free of falls  Description: INTERVENTIONS:  - Educate patient/family on patient safety including physical limitations  - Instruct patient to call for assistance with activity   - Consult OT/PT to assist with strengthening/mobility   - Keep Call bell within reach  - Keep bed low and locked with side rails adjusted as appropriate  - Keep care items and personal belongings within reach  - Initiate and maintain comfort rounds  - Make Fall Risk Sign visible to staff  - Offer Toileting every 2 Hours,  in advance of need  - Initiate/Maintain bed alarm  - Apply yellow socks and bracelet for high fall risk patients  - Consider moving patient to room near nurses station  Outcome: Progressing  Goal: Maintain or return to baseline ADL function  Description: INTERVENTIONS:  -  Assess patient's ability to carry out ADLs; assess patient's baseline for ADL function and identify physical deficits which impact ability to perform ADLs (bathing, care of mouth/teeth, toileting, grooming, dressing, etc.)  - Assess/evaluate cause of self-care deficits   - Assess range of motion  - Assess patient's mobility; develop plan if impaired  - Assess patient's need for assistive devices and provide as appropriate  - Encourage maximum independence but intervene and supervise when necessary  - Involve family in performance of ADLs  - Assess for home care needs following discharge   - Consider OT consult to assist with ADL evaluation and planning for discharge  - Provide patient education as appropriate  Outcome: Progressing  Goal: Maintains/Returns to pre admission functional level  Description: INTERVENTIONS:  - Perform AM-PAC 6 Click Basic Mobility/ Daily Activity assessment daily.  - Set and communicate daily mobility goal to care team and patient/family/caregiver.   - Collaborate with rehabilitation services on mobility goals if consulted  - Perform Range of Motion 4 times a day.  - Reposition patient every 2 hours.  - Dangle patient 4 times a day  - Stand patient 4 times a day  - Ambulate patient 4 times a day  - Out of bed to chair 4 times a day   - Out of bed for meals 4 times a day  - Out of bed for toileting  - Record patient progress and toleration of activity level   Outcome: Progressing     Problem: DISCHARGE PLANNING  Goal: Discharge to home or other facility with appropriate resources  Description: INTERVENTIONS:  - Identify barriers to discharge w/patient and caregiver  - Arrange for needed discharge resources and  transportation as appropriate  - Identify discharge learning needs (meds, wound care, etc.)  - Arrange for interpretive services to assist at discharge as needed  - Refer to Case Management Department for coordinating discharge planning if the patient needs post-hospital services based on physician/advanced practitioner order or complex needs related to functional status, cognitive ability, or social support system  Outcome: Progressing     Problem: Knowledge Deficit  Goal: Patient/family/caregiver demonstrates understanding of disease process, treatment plan, medications, and discharge instructions  Description: Complete learning assessment and assess knowledge base.  Interventions:  - Provide teaching at level of understanding  - Provide teaching via preferred learning methods  Outcome: Progressing

## 2024-12-22 NOTE — PROGRESS NOTES
Progress Note - Hospitalist   Name: Martell Mejía 49 y.o. male I MRN: [id number]  Unit/Bed#: [facility] 421-01 I Date of Admission: 12/18/2024   Date of Service: 12/22/2024 I Hospital Day: 4    Assessment & Plan  Acute on Chronic systolic heart failure (HCC)  Wt Readings from Last 3 Encounters:   12/22/24 94.5 kg (208 lb 6.4 oz)   09/24/24 99.9 kg (220 lb 3.2 oz)   02/27/24 103 kg (227 lb 3.2 oz)     In October patient presented to the ED with shortness of breath, found to have new onset heart failure  Echo showed ejection fraction of 20% and he has been following with [facility] since then.   Stress test showed large size, severe intensity infarct in the RCA/LCx territory   Did not have cardiac catheterization at that time but will agree to whatever testing is recommended now  Patient presents with shortness of breath on arrival (12/18/24)   Chest x-ray showed no vascular congestion but CT chest done and reviewed.   CT showed moderate pulm effusion in keeping with CHF and a RML pulmonary 3X3 cm mass--> patient aware that 3 month follow up is required   BNP was elevated at 1481, has AL and mild transaminitis.    Troponin x 2 negative, EKG without acute ischemic changes, has PVCs  In the past month medications were changed due to side effects, new medication introduced for GDMT  12/5/24 Lasix was decreased from 40 mg to 20 mg by cardiology but patient continued to take 40 mg daily, he was afraid he will develop lower extremity swelling  He was on Entresto until last week, due to cough it was switched to valsartan 40 mg daily  He is on spironolactone 12.5 mg daily, Jardiance 10 mg daily, metoprolol succinate 25 mg daily  Received a LifeVest on Friday  Follow daily weight and I/O closely  Continue IV diuresis  CMR when euvolemic  BMP daily    Dyspnea on exertion  Seems to be ongoing since October  Most likely related to ischemic cardiomyopathy  Acute kidney injury (HCC)  Creatinine up until October was from 0.8-1.1  Increasing  creatinine could be medication induced and intravascular volume depletion vs cardiorenal syndrome  Suspect cardio-renal syndrome  FeNa is consistent with prerenal/HF  No casts in urine  Monitor daily BMP   Hyponatremia  Likely due to CHF  Monitor with diuresis  Transaminitis  Mild transaminitis, due to CHF  Improving with diuresis  Anxiety  Continue Xanax 1 mg 3 times daily as needed  PDMP reviewed  Benign essential hypertension  Continue metoprolol succinate 25 mg daily  Holding ACEI/ARB due to elevated creatinine  Hypercholesterolemia  On Crestor 5 mg daily, substitute for pravastatin 40 mg daily  Gastroesophageal reflux disease with esophagitis  Continue pantoprazole 40 mg daily    VTE Pharmacologic Prophylaxis: VTE Score: 3 Moderate Risk (Score 3-4) - Pharmacological DVT Prophylaxis Ordered: heparin.    Mobility:   Basic Mobility Inpatient Raw Score: 24  JH-HLM Goal: 8: Walk 250 feet or more  JH-HLM Achieved: 7: Walk 25 feet or more  JH-HLM Goal achieved. Continue to encourage appropriate mobility.    Patient Centered Rounds: I performed bedside rounds with nursing staff today.   Discussions with Specialists or Other Care Team Provider: nurseANGUS    Current Length of Stay: 4 day(s)  Current Patient Status: Inpatient   Certification Statement: The patient will continue to require additional inpatient hospital stay due to diuresis  Discharge Plan: Anticipate discharge in 24-48 hrs to home.    Code Status: Level 1 - Full Code    Subjective   Reports that his breathing continues to improve. Does report increased LE edema today    Objective :  Temp:  [97.4 °F (36.3 °C)-98.4 °F (36.9 °C)] 97.5 °F (36.4 °C)  HR:  [] 98  BP: ()/(52-80) 104/76  Resp:  [18] 18  SpO2:  [94 %-99 %] 97 %  O2 Device: None (Room air)    Body mass index is 29.9 kg/m².     Input and Output Summary (last 24 hours):     Intake/Output Summary (Last 24 hours) at 12/22/2024 2457  Last data filed at 12/22/2024 0910  Gross per 24 hour    Intake 522 ml   Output 3750 ml   Net -3228 ml       Physical Exam  Constitutional:       Appearance: Normal appearance.   HENT:      Head: Normocephalic and atraumatic.      Nose: Nose normal.   Eyes:      Extraocular Movements: Extraocular movements intact.   Neck:      Comments: JVD+  Cardiovascular:      Rate and Rhythm: Normal rate and regular rhythm.   Pulmonary:      Effort: Pulmonary effort is normal.      Breath sounds: No wheezing or rales.   Abdominal:      General: There is no distension.      Palpations: Abdomen is soft.      Tenderness: There is no abdominal tenderness.   Musculoskeletal:      Right lower leg: Edema present.      Left lower leg: Edema present.   Neurological:      Mental Status: He is alert and oriented to person, place, and time.   Psychiatric:         Mood and Affect: Mood normal.         Behavior: Behavior normal.           Lines/Drains:        Telemetry:  Telemetry Orders (From admission, onward)               24 Hour Telemetry Monitoring  Continuous x 24 Hours (Telem)        Expiring   Question:  Reason for 24 Hour Telemetry  Answer:  Decompensated CHF- and any one of the following: continuous diuretic infusion or total diuretic dose >200 mg daily, associated electrolyte derangement (I.e. K < 3.0), inotropic drip (continuous infusion), hx of ventricular arrhythmia, or new EF < 35%        LifeVest Patient: Continuous Telemetry Monitoring during hospitalization (non-expiring)  Continuous LifeVest Telemetry Monitoring        References:    LifeVest Policy                     Telemetry Reviewed: Normal Sinus Rhythm  Indication for Continued Telemetry Use: Acute CHF on >200 mg lasix/day or equivalent dose or with new reduced EF.                Lab Results: I have reviewed the following results:   Results from last 7 days   Lab Units 12/20/24  0552   WBC Thousand/uL 8.87   HEMOGLOBIN g/dL 14.2   HEMATOCRIT % 43.9   PLATELETS Thousands/uL 219   SEGS PCT % 62   LYMPHO PCT % 25   MONO PCT  % 8   EOS PCT % 3     Results from last 7 days   Lab Units 12/22/24  0806   SODIUM mmol/L 128*   POTASSIUM mmol/L 4.3   CHLORIDE mmol/L 92*   CO2 mmol/L 27   BUN mg/dL 33*   CREATININE mg/dL 1.60*   ANION GAP mmol/L 9   CALCIUM mg/dL 9.1   ALBUMIN g/dL 3.9   TOTAL BILIRUBIN mg/dL 1.11*   ALK PHOS U/L 167*   ALT U/L 77*   AST U/L 39   GLUCOSE RANDOM mg/dL 95             Results from last 7 days   Lab Units 12/20/24  0552   HEMOGLOBIN A1C % 6.5*           Recent Cultures (last 7 days):         Imaging Results Review: No pertinent imaging studies reviewed.  Other Study Results Review: No additional pertinent studies reviewed.    Last 24 Hours Medication List:     Current Facility-Administered Medications:     albuterol (PROVENTIL HFA,VENTOLIN HFA) inhaler 2 puff, 4x Daily    ALPRAZolam (XANAX) tablet 1 mg, TID PRN    aspirin chewable tablet 81 mg, Daily    Empagliflozin (JARDIANCE) tablet 10 mg, QAM    furosemide (LASIX) injection 40 mg, BID (diuretic)    heparin (porcine) subcutaneous injection 5,000 Units, Q8H ALEJANDRO **AND** [CANCELED] Platelet count, Once    metoprolol succinate (TOPROL-XL) 24 hr tablet 25 mg, BID    pantoprazole (PROTONIX) EC tablet 40 mg, Early Morning    potassium chloride (Klor-Con M20) CR tablet 20 mEq, BID    pravastatin (PRAVACHOL) tablet 40 mg, Daily With Dinner    Insert peripheral IV, Once **AND** sodium chloride (PF) 0.9 % injection 3 mL, Q1H PRN    Administrative Statements   Today, Patient Was Seen By: Tahir Kumar MD  I have spent a total time of 40 minutes in caring for this patient on the day of the visit/encounter including Diagnostic results, Instructions for management, Patient and family education, Impressions, Counseling / Coordination of care, Documenting in the medical record, Reviewing / ordering tests, medicine, procedures  , Obtaining or reviewing history  , and Communicating with other healthcare professionals .    **Please Note: This note may have been constructed using  a voice recognition system.**

## 2024-12-22 NOTE — PROGRESS NOTES
Heart Failure/ Pulmonary Hypertension Progress Note - Martell Mejía 49 y.o. male MRN: [id number]    Unit/Bed#: [facility] 421-01 Encounter: [id number]      Assessment:    Principal Problem:    Acute on Chronic systolic heart failure (HCC)  Active Problems:    Anxiety    Benign essential hypertension    Hypercholesterolemia    Gastroesophageal reflux disease with esophagitis    Acute kidney injury (HCC)    Hyponatremia    Transaminitis    Dyspnea on exertion      Subjective:   Patient seen and examined.  No significant events overnight. Feels better but still with SOB.     Objective:   Intake/ Output: 702/3550/-2.8 L  Weight: 208 lbs down from around 215  Tele: SR, frequent PVCs, 10 beat NSVT  MAPs: 75-85    Acute on chronic HFrEF, LVEF 15%  -Etiology Unclear. Prior  infarct noted on NPI but felt to nonischemic contribution  -LE cool on exam. JVP still elevated but improving.   -Will continue with diuresis again  today. Possible transition to oral regimen within next 24-48 hours  -having increased vent ectopy with NSVT up to 10 beats. Will trial on BID Metoprolol. Monitor closely for signs of worsening hypoperfusion given severely reduced LV function.  -Maintain K >4, Mag >2  -continue with GDMT optimization where able.   -discussion for stress MRI to further evaluate etiology.   -please continue strict I/Os, daily standing weights, BMP    Neurohormonal Blockade:  --Beta-Blocker: Toprol XL 25 mg QD--->increase to BID  --ACEi, ARB or ARNi: Valsartan 40 QD (on Hold)  --Aldosterone Receptor Blocker: Aldactone 12.5 mg QD (on Hold)  --SGLT2i: Jardiance 10 mg QD  --Diuretic:   Outpatient: Lasix 40mg QD PO    Inpatient: Lasix 40 mg IV BID     Sudden Cardiac Death Risk Reduction:  --ICD: LVEF <20% Lifevest from [facility]     Electrical Resynchronization:  --Candidacy for BiV device: Will continue to monitor     Advanced Therapies: Will continue to monitor.   --Inotrope:   --LVAD/Transplant Candidacy: THC+     TTE 12/19/24: LVEF 15%,  "LVIDd 5.8 cm, RV function reduced, mild to mod TR, mild MR, est RAP 10 mmHg,     11/21/2024 nuclear pharmacologic stress test performed at [facility]:  There is a fixed, large size, severe intensity perfusion defect in the basal to apical inferior, inferoseptal and inferolateral wall segments, which does not improve with pronation. In the presence of akinesis, this is suggestive of infarct.    11/26/2024 TTE at [facility]:  LVEF \"less than 20%\", global hypokinesis, indeterminate diastolic function.  No evidence of apical thrombus with Definity contrast.  R, mildly dilated, systolic function moderately reduced.  Moderately dilated LA, mildly dilated RA.  Trace MR, mild TR, mild TX  Right atrial pressure estimated at 5 to 10 mmHg    2/25/2013 TTE at [facility]:  LVEF 60%, normal RV and atria    Frequent PVCs, NSVT  - K, Mag stable  -likely due to severely reduced LV function, ?possible scar mediated given large infarct on recent nuclear stress imaging.  -trial on increased dose of Metoprolol, monitor response  -has LifeVest  -consider EP eval    Acute kidney injury (HCC)  Baseline seems to be 0.9-1.0 however over the past 2 months lingering 1.40s, today up to 1.6  Likely combo of CRS, low flow state      Hyponatremia  -likely due to severely reduced LV function, chronic HF    Benign essential hypertension    Transaminitis  Likely 2/2 congestive hepatopathy  improving    Hypercholesterolemia  On Pravachol    Anxiety  Per primary    Gastroesophageal reflux disease with esophagitis    Review of Systems   All other systems reviewed and are negative.       Central Line (day, reason):  Arrington catheter (day, reason):    Vitals: Blood pressure 93/52, pulse 72, temperature 97.9 °F (36.6 °C), temperature source Oral, resp. rate 18, height 5' 10\" (1.778 m), weight 94.5 kg (208 lb 6.4 oz), SpO2 98%., Body mass index is 29.9 kg/m²., I/O last 3 completed shifts:  In: 1202 [P.O.:1202]  Out: 5025 [Urine:5025]  No intake/output data recorded.  Wt Readings " from Last 3 Encounters:   12/22/24 94.5 kg (208 lb 6.4 oz)   09/24/24 99.9 kg (220 lb 3.2 oz)   02/27/24 103 kg (227 lb 3.2 oz)       Intake/Output Summary (Last 24 hours) at 12/22/2024 0753  Last data filed at 12/22/2024 0253  Gross per 24 hour   Intake 442 ml   Output 3550 ml   Net -3108 ml     I/O last 3 completed shifts:  In: 1202 [P.O.:1202]  Out: 5025 [Urine:5025]        Physical Exam:  Vitals:    12/21/24 2252 12/22/24 0236 12/22/24 0600 12/22/24 0718   BP: 98/70 115/61  93/52   BP Location:    Right arm   Pulse: 64 83  72   Resp:    18   Temp: 97.7 °F (36.5 °C) 98.4 °F (36.9 °C)  97.9 °F (36.6 °C)   TempSrc:    Oral   SpO2: 99% 99%  98%   Weight:   94.5 kg (208 lb 6.4 oz)    Height:           GEN: Martell Mejía appears well, alert and oriented x 3, pleasant and cooperative   HEENT: pupils equal, round, and reactive to light; extraocular muscles intact  NECK: supple, no carotid bruits   HEART: regular rhythm, normal S1 and S2, no murmurs, clicks, gallops or rubs, JVP is at mid neck   LUNGS: clear to auscultation bilaterally; no wheezes, rales, or rhonchi   ABDOMEN: normal bowel sounds, soft, no tenderness, no distention  EXTREMITIES: cool, peripheral pulses normal; no clubbing, cyanosis, no BLLE edema  NEURO: no focal findings   SKIN: normal without suspicious lesions on exposed skin      Current Facility-Administered Medications:     albuterol (PROVENTIL HFA,VENTOLIN HFA) inhaler 2 puff, 2 puff, Inhalation, 4x Daily, Nichole Meek MD, 2 puff at 12/21/24 2147    ALPRAZolam (XANAX) tablet 1 mg, 1 mg, Oral, TID PRN, Nichoel Meek MD, 1 mg at 12/22/24 0256    aspirin chewable tablet 81 mg, 81 mg, Oral, Daily, Nichole Mladenovic, MD, 81 mg at 12/21/24 0836    Empagliflozin (JARDIANCE) tablet 10 mg, 10 mg, Oral, QAM, Nichole Meek MD, 10 mg at 12/21/24 0837    furosemide (LASIX) injection 40 mg, 40 mg, Intravenous, BID (diuretic), CANDY Borden, 40 mg at 12/21/24 8494    heparin (porcine)  subcutaneous injection 5,000 Units, 5,000 Units, Subcutaneous, Q8H ALEJANDRO, 5,000 Units at 12/22/24 0740 **AND** [CANCELED] Platelet count, , , Once, Nichole Meek MD    metoprolol succinate (TOPROL-XL) 24 hr tablet 25 mg, 25 mg, Oral, Daily, CANDY Borden    pantoprazole (PROTONIX) EC tablet 40 mg, 40 mg, Oral, Early Morning, Nichole Meek MD, 40 mg at 12/22/24 0740    potassium chloride (Klor-Con M20) CR tablet 20 mEq, 20 mEq, Oral, BID, Tahir Kumar MD, 20 mEq at 12/21/24 1737    pravastatin (PRAVACHOL) tablet 40 mg, 40 mg, Oral, Daily With Dinner, Nichole Meek MD, 40 mg at 12/21/24 1737    Insert peripheral IV, , , Once **AND** sodium chloride (PF) 0.9 % injection 3 mL, 3 mL, Intravenous, Q1H PRN, Nichole Meek MD      Labs & Results:        Results from last 7 days   Lab Units 12/20/24  0552 12/19/24  0638 12/18/24  1410   WBC Thousand/uL 8.87 9.70 10.97*   HEMOGLOBIN g/dL 14.2 15.0 15.7   HEMATOCRIT % 43.9 45.6 48.9   PLATELETS Thousands/uL 219 233 290         Results from last 7 days   Lab Units 12/21/24  0957 12/20/24  0552 12/19/24  0726 12/18/24  1410   POTASSIUM mmol/L 3.6 3.9 4.7 5.3   CHLORIDE mmol/L 93* 93* 91* 93*   CO2 mmol/L 23 23 29 21   BUN mg/dL 31* 31* 30* 29*   CREATININE mg/dL 1.42* 1.53* 1.52* 1.67*   CALCIUM mg/dL 9.0 8.8 9.5 8.9   ALK PHOS U/L  --  136* 126* 132*   ALT U/L  --  91* 98* 87*   AST U/L  --  54* 68* 63*           Kim GUPTA

## 2024-12-22 NOTE — ASSESSMENT & PLAN NOTE
Wt Readings from Last 3 Encounters:   12/22/24 94.5 kg (208 lb 6.4 oz)   09/24/24 99.9 kg (220 lb 3.2 oz)   02/27/24 103 kg (227 lb 3.2 oz)     In October patient presented to the ED with shortness of breath, found to have new onset heart failure  Echo showed ejection fraction of 20% and he has been following with [facility] since then.   Stress test showed large size, severe intensity infarct in the RCA/LCx territory   Did not have cardiac catheterization at that time but will agree to whatever testing is recommended now  Patient presents with shortness of breath on arrival (12/18/24)   Chest x-ray showed no vascular congestion but CT chest done and reviewed.   CT showed moderate pulm effusion in keeping with CHF and a RML pulmonary 3X3 cm mass--> patient aware that 3 month follow up is required   BNP was elevated at 1481, has AL and mild transaminitis.    Troponin x 2 negative, EKG without acute ischemic changes, has PVCs  In the past month medications were changed due to side effects, new medication introduced for GDMT  12/5/24 Lasix was decreased from 40 mg to 20 mg by cardiology but patient continued to take 40 mg daily, he was afraid he will develop lower extremity swelling  He was on Entresto until last week, due to cough it was switched to valsartan 40 mg daily  He is on spironolactone 12.5 mg daily, Jardiance 10 mg daily, metoprolol succinate 25 mg daily  Received a LifeVest on Friday  Follow daily weight and I/O closely  Continue IV diuresis  CMR when euvolemic  BMP daily

## 2024-12-23 ENCOUNTER — PATIENT OUTREACH (OUTPATIENT)
Dept: CARDIOLOGY CLINIC | Facility: CLINIC | Age: 49
End: 2024-12-23

## 2024-12-23 LAB
ALBUMIN SERPL BCG-MCNC: 3.9 G/DL (ref 3.5–5)
ALP SERPL-CCNC: 169 U/L (ref 34–104)
ALT SERPL W P-5'-P-CCNC: 100 U/L (ref 7–52)
ANION GAP SERPL CALCULATED.3IONS-SCNC: 12 MMOL/L (ref 4–13)
AST SERPL W P-5'-P-CCNC: 70 U/L (ref 13–39)
BILIRUB SERPL-MCNC: 1.37 MG/DL (ref 0.2–1)
BUN SERPL-MCNC: 31 MG/DL (ref 5–25)
CALCIUM SERPL-MCNC: 9.2 MG/DL (ref 8.4–10.2)
CHLORIDE SERPL-SCNC: 91 MMOL/L (ref 96–108)
CO2 SERPL-SCNC: 22 MMOL/L (ref 21–32)
CREAT SERPL-MCNC: 1.41 MG/DL (ref 0.6–1.3)
GFR SERPL CREATININE-BSD FRML MDRD: 58 ML/MIN/1.73SQ M
GLUCOSE SERPL-MCNC: 123 MG/DL (ref 65–140)
POTASSIUM SERPL-SCNC: 4.4 MMOL/L (ref 3.5–5.3)
PROT SERPL-MCNC: 5.8 G/DL (ref 6.4–8.4)
SODIUM SERPL-SCNC: 125 MMOL/L (ref 135–147)

## 2024-12-23 PROCEDURE — 80053 COMPREHEN METABOLIC PANEL: CPT | Performed by: NURSE PRACTITIONER

## 2024-12-23 PROCEDURE — 99232 SBSQ HOSP IP/OBS MODERATE 35: CPT | Performed by: INTERNAL MEDICINE

## 2024-12-23 RX ORDER — FUROSEMIDE 10 MG/ML
80 INJECTION INTRAMUSCULAR; INTRAVENOUS
Status: DISCONTINUED | OUTPATIENT
Start: 2024-12-23 | End: 2024-12-25

## 2024-12-23 RX ORDER — FUROSEMIDE 10 MG/ML
40 INJECTION INTRAMUSCULAR; INTRAVENOUS
Status: DISCONTINUED | OUTPATIENT
Start: 2024-12-23 | End: 2024-12-23

## 2024-12-23 RX ORDER — FUROSEMIDE 10 MG/ML
40 INJECTION INTRAMUSCULAR; INTRAVENOUS ONCE
Status: COMPLETED | OUTPATIENT
Start: 2024-12-23 | End: 2024-12-23

## 2024-12-23 RX ADMIN — HEPARIN SODIUM 5000 UNITS: 5000 INJECTION, SOLUTION INTRAVENOUS; SUBCUTANEOUS at 00:07

## 2024-12-23 RX ADMIN — EMPAGLIFLOZIN 10 MG: 10 TABLET, FILM COATED ORAL at 09:13

## 2024-12-23 RX ADMIN — SACUBITRIL AND VALSARTAN 1 TABLET: 24; 26 TABLET, FILM COATED ORAL at 17:19

## 2024-12-23 RX ADMIN — HEPARIN SODIUM 5000 UNITS: 5000 INJECTION, SOLUTION INTRAVENOUS; SUBCUTANEOUS at 22:07

## 2024-12-23 RX ADMIN — HEPARIN SODIUM 5000 UNITS: 5000 INJECTION, SOLUTION INTRAVENOUS; SUBCUTANEOUS at 13:44

## 2024-12-23 RX ADMIN — HEPARIN SODIUM 5000 UNITS: 5000 INJECTION, SOLUTION INTRAVENOUS; SUBCUTANEOUS at 05:49

## 2024-12-23 RX ADMIN — ALBUTEROL SULFATE 2 PUFF: 90 AEROSOL, METERED RESPIRATORY (INHALATION) at 00:07

## 2024-12-23 RX ADMIN — METOPROLOL SUCCINATE 25 MG: 25 TABLET, EXTENDED RELEASE ORAL at 09:13

## 2024-12-23 RX ADMIN — ALBUTEROL SULFATE 2 PUFF: 90 AEROSOL, METERED RESPIRATORY (INHALATION) at 09:13

## 2024-12-23 RX ADMIN — ASPIRIN 81 MG CHEWABLE TABLET 81 MG: 81 TABLET CHEWABLE at 09:13

## 2024-12-23 RX ADMIN — FUROSEMIDE 40 MG: 10 INJECTION, SOLUTION INTRAMUSCULAR; INTRAVENOUS at 06:12

## 2024-12-23 RX ADMIN — PRAVASTATIN SODIUM 40 MG: 40 TABLET ORAL at 17:19

## 2024-12-23 RX ADMIN — FUROSEMIDE 80 MG: 10 INJECTION, SOLUTION INTRAMUSCULAR; INTRAVENOUS at 17:22

## 2024-12-23 RX ADMIN — PANTOPRAZOLE SODIUM 40 MG: 40 TABLET, DELAYED RELEASE ORAL at 05:50

## 2024-12-23 RX ADMIN — METOPROLOL SUCCINATE 25 MG: 25 TABLET, EXTENDED RELEASE ORAL at 17:19

## 2024-12-23 RX ADMIN — POTASSIUM CHLORIDE 20 MEQ: 1500 TABLET, EXTENDED RELEASE ORAL at 09:13

## 2024-12-23 RX ADMIN — ALBUTEROL SULFATE 2 PUFF: 90 AEROSOL, METERED RESPIRATORY (INHALATION) at 22:08

## 2024-12-23 RX ADMIN — FUROSEMIDE 40 MG: 10 INJECTION, SOLUTION INTRAMUSCULAR; INTRAVENOUS at 12:04

## 2024-12-23 RX ADMIN — POTASSIUM CHLORIDE 20 MEQ: 1500 TABLET, EXTENDED RELEASE ORAL at 17:19

## 2024-12-23 RX ADMIN — ALBUTEROL SULFATE 2 PUFF: 90 AEROSOL, METERED RESPIRATORY (INHALATION) at 17:18

## 2024-12-23 NOTE — PROGRESS NOTES
Progress Note - Hospitalist   Name: Martell Mejía 49 y.o. male I MRN: [id number]  [facility] I Date of Admission: 12/18/2024   Date of Service: 12/23/2024 I Hospital Day: 5    Assessment & Plan  Acute on Chronic systolic heart failure (HCC)  Wt Readings from Last 3 Encounters:   12/23/24 95.3 kg (210 lb)   09/24/24 99.9 kg (220 lb 3.2 oz)   02/27/24 103 kg (227 lb 3.2 oz)     In October patient presented to the ED with shortness of breath, found to have new onset heart failure  Echo showed ejection fraction of 20% and he has been following with [facility] since then.   Stress test showed large size, severe intensity infarct in the RCA/LCx territory   Did not have cardiac catheterization at that time but will agree to whatever testing is recommended now  Patient presents with shortness of breath on arrival (12/18/24)   Chest x-ray showed no vascular congestion but CT chest done and reviewed.   CT showed moderate pulm effusion in keeping with CHF and a RML pulmonary 3X3 cm mass--> patient aware that 3 month follow up is required   BNP was elevated at 1481, has AL and mild transaminitis.    Troponin x 2 negative, EKG without acute ischemic changes, has PVCs  In the past month medications were changed due to side effects, new medication introduced for GDMT  12/5/24 Lasix was decreased from 40 mg to 20 mg by cardiology but patient continued to take 40 mg daily, he was afraid he will develop lower extremity swelling  He was on Entresto until last week, due to cough it was switched to valsartan 40 mg daily  He is on spironolactone 12.5 mg daily, Jardiance 10 mg daily, metoprolol succinate 25 mg daily  Received a LifeVest on Friday  Follow daily weight and I/O closely  Weights are up today and Na is decreased  Continue IV diuresis  CMR when euvolemic  BMP daily    Dyspnea on exertion  Seems to be ongoing since October  Most likely related to ischemic cardiomyopathy  Acute kidney injury (HCC)  Creatinine up until  October was from 0.8-1.1  Increasing creatinine could be medication induced and intravascular volume depletion vs cardiorenal syndrome  Suspect cardio-renal syndrome  FeNa is consistent with prerenal/HF  No casts in urine  Monitor daily BMP   Hyponatremia  Likely due to CHF  Monitor with diuresis  Transaminitis  Mild transaminitis, due to CHF  Improving with diuresis  Anxiety  Continue Xanax 1 mg 3 times daily as needed  PDMP reviewed  Benign essential hypertension  Continue metoprolol succinate 25 mg daily  Holding ACEI/ARB due to elevated creatinine  Hypercholesterolemia  On Crestor 5 mg daily, substitute for pravastatin 40 mg daily  Gastroesophageal reflux disease with esophagitis  Continue pantoprazole 40 mg daily    VTE Pharmacologic Prophylaxis: VTE Score: 3 Moderate Risk (Score 3-4) - Pharmacological DVT Prophylaxis Ordered: enoxaparin (Lovenox).    Mobility:   Basic Mobility Inpatient Raw Score: 24  JH-HLM Goal: 8: Walk 250 feet or more  JH-HLM Achieved: 7: Walk 25 feet or more  JH-HLM Goal achieved. Continue to encourage appropriate mobility.    Patient Centered Rounds: I performed bedside rounds with nursing staff today.   Discussions with Specialists or Other Care Team Provider: nurseANGUS      Current Length of Stay: 5 day(s)  Current Patient Status: Inpatient   Certification Statement: The patient will continue to require additional inpatient hospital stay due to diuresis  Discharge Plan: Anticipate discharge in 24-48 hrs to home.    Code Status: Level 1 - Full Code    Subjective   Reports that his breathing is worse today. Leg edema improved with compression stockings    Objective :  Temp:  [97.5 °F (36.4 °C)-98.2 °F (36.8 °C)] 97.5 °F (36.4 °C)  HR:  [] 41  BP: ()/(75-79) 93/75  SpO2:  [75 %-100 %] 97 %  O2 Device: None (Room air)    Body mass index is 30.13 kg/m².     Input and Output Summary (last 24 hours):     Intake/Output Summary (Last 24 hours) at 12/23/2024 3264  Last data filed at  12/23/2024 1208  Gross per 24 hour   Intake 690 ml   Output 3525 ml   Net -2835 ml       Physical Exam  Constitutional:       Appearance: Normal appearance.   HENT:      Head: Normocephalic and atraumatic.      Nose: Nose normal.   Eyes:      Extraocular Movements: Extraocular movements intact.   Neck:      Comments: +JVD  Cardiovascular:      Rate and Rhythm: Normal rate and regular rhythm.   Pulmonary:      Breath sounds: Rales present.   Musculoskeletal:      Right lower leg: Edema present.      Left lower leg: Edema present.   Neurological:      Mental Status: He is alert and oriented to person, place, and time.   Psychiatric:         Mood and Affect: Mood normal.         Behavior: Behavior normal.           Lines/Drains:        Telemetry:  Telemetry Orders (From admission, onward)               24 Hour Telemetry Monitoring  Continuous x 24 Hours (Telem)        Expiring   Question:  Reason for 24 Hour Telemetry  Answer:  Decompensated CHF- and any one of the following: continuous diuretic infusion or total diuretic dose >200 mg daily, associated electrolyte derangement (I.e. K < 3.0), inotropic drip (continuous infusion), hx of ventricular arrhythmia, or new EF < 35%        LifeVest Patient: Continuous Telemetry Monitoring during hospitalization (non-expiring)  Continuous LifeVest Telemetry Monitoring        References:    LifeVest Policy                     Telemetry Reviewed: Normal Sinus Rhythm  Indication for Continued Telemetry Use: Acute CHF on >200 mg lasix/day or equivalent dose or with new reduced EF.                Lab Results: I have reviewed the following results:   Results from last 7 days   Lab Units 12/20/24  0552   WBC Thousand/uL 8.87   HEMOGLOBIN g/dL 14.2   HEMATOCRIT % 43.9   PLATELETS Thousands/uL 219   SEGS PCT % 62   LYMPHO PCT % 25   MONO PCT % 8   EOS PCT % 3     Results from last 7 days   Lab Units 12/23/24  0500   SODIUM mmol/L 125*   POTASSIUM mmol/L 4.4   CHLORIDE mmol/L 91*   CO2  mmol/L 22   BUN mg/dL 31*   CREATININE mg/dL 1.41*   ANION GAP mmol/L 12   CALCIUM mg/dL 9.2   ALBUMIN g/dL 3.9   TOTAL BILIRUBIN mg/dL 1.37*   ALK PHOS U/L 169*   ALT U/L 100*   AST U/L 70*   GLUCOSE RANDOM mg/dL 123             Results from last 7 days   Lab Units 12/20/24  0552   HEMOGLOBIN A1C % 6.5*           Recent Cultures (last 7 days):         Imaging Results Review: No pertinent imaging studies reviewed.  Other Study Results Review: No additional pertinent studies reviewed.    Last 24 Hours Medication List:     Current Facility-Administered Medications:     albuterol (PROVENTIL HFA,VENTOLIN HFA) inhaler 2 puff, 4x Daily    ALPRAZolam (XANAX) tablet 1 mg, TID PRN    aspirin chewable tablet 81 mg, Daily    Empagliflozin (JARDIANCE) tablet 10 mg, QAM    furosemide (LASIX) injection 80 mg, BID (diuretic)    heparin (porcine) subcutaneous injection 5,000 Units, Q8H ALEJANDRO **AND** [CANCELED] Platelet count, Once    metoprolol succinate (TOPROL-XL) 24 hr tablet 25 mg, BID    pantoprazole (PROTONIX) EC tablet 40 mg, Early Morning    potassium chloride (Klor-Con M20) CR tablet 20 mEq, BID    pravastatin (PRAVACHOL) tablet 40 mg, Daily With Dinner    sacubitril-valsartan (ENTRESTO) 24-26 MG per tablet 1 tablet, BID    Insert peripheral IV, Once **AND** sodium chloride (PF) 0.9 % injection 3 mL, Q1H PRN    Administrative Statements   Today, Patient Was Seen By: Tahir Kumar MD  I have spent a total time of 40 minutes in caring for this patient on the day of the visit/encounter including Diagnostic results, Instructions for management, Patient and family education, Impressions, Counseling / Coordination of care, Documenting in the medical record, Reviewing / ordering tests, medicine, procedures  , Obtaining or reviewing history  , and Communicating with other healthcare professionals .    **Please Note: This note may have been constructed using a voice recognition system.**

## 2024-12-23 NOTE — PROGRESS NOTES
Advanced Heart Failure / Pulmonary Hypertension Service Progress Note    Martell Mejía 49 y.o. male   MRN: [id number]  Unit/Bed#: [facility] 421-01; Encounter: [id number]    Assessment:  Principal Problem:    Acute on Chronic systolic heart failure (HCC)  Active Problems:    Anxiety    Benign essential hypertension    Hypercholesterolemia    Gastroesophageal reflux disease with esophagitis    Acute kidney injury (HCC)    Hyponatremia    Transaminitis    Dyspnea on exertion      Martell is a 49-year-old male with a past medical history of hypertension, hyperlipidemia, HFrEF 20%, and a reported history of multiple congenital ventricular septal defects status post repaired. Had an NPI at [facility] in Nov this year suggestive of ischemia, but uncertain--no cardiac cath at that time. GDMT started at that time by [facility].  he presented to the ED for his symptoms of shortness of breath. BNP 1481, Trops peak 36, , creat 1.69. CXR left pleural effusion.  CTA--Moderate bilateral pleural effusions, questionable atelectasis versus mass in the right middle lobe     Subjective:   Patient seen and examined. No significant events overnight. Reports CAMERON and abdominal bloating.      Objective:   Intake/ Output: 990/ 2925 (-1.9)  Weight: Admit 218 bed--208 stndg-->210 lb (+2 lbs)  Telemetry: SR, occasional PVC's    Today's Plan:  Increase Lasix to 80 IV BID + Kdur 40 daily --remains volume up with some HF symptoms. +JVD,  Up in weight 2 lbs, NA down 125.   Toprol XL 25 BID.  Jaridance 10 daily.  Restart Entresto 24-26 BID today.    Etiology of HFrEF still unclear--MRI done and pending  Repeat BMP in a.m.  Continue 2000 cc FR, daily standing weights  Hoping to DC patient in next 24-48 hrs if volume status improves on higher diuretic dose    Plan:  Acute on chronic HFrEF, LVEF 15%  -Etiology Unclear. Prior  infarct noted on NPI but felt to nonischemic contribution.  CMRI pending    Volume status: Increase IV diuresis--remains volume up  "with weight gain and worsening hyponatremia.      Neurohormonal Blockade:  --Beta-Blocker: Toprol XL 25 mg QD--->increase to BID  --ACEi, ARB or ARNi: Valsartan 40 QD (on Hold)  --Aldosterone Receptor Blocker: Aldactone 12.5 mg QD (on Hold)  --SGLT2i: Jardiance 10 mg QD  --Diuretic:   Home diuretic: Lasix 40mg QD PO    Inpatient: Lasix 40 mg IV BID--> 80 IV lasix BID     Sudden Cardiac Death Risk Reduction:  --ICD: LVEF <20% Lifevest from [facility]     Electrical Resynchronization:  --Candidacy for BiV device: Will continue to monitor     Advanced Therapies: Will continue to monitor.   --Inotrope:   --LVAD/Transplant Candidacy: THC+      TTE 12/19/24: LVEF 15%, LVIDd 5.8 cm, RV function reduced, mild to mod TR, mild MR, est RAP 10 mmHg,      11/21/2024 nuclear pharmacologic stress test performed at [facility]:  There is a fixed, large size, severe intensity perfusion defect in the basal to apical inferior, inferoseptal and inferolateral wall segments, which does not improve with pronation. In the presence of akinesis, this is suggestive of infarct.    11/26/2024 TTE at [facility]:  LVEF \"less than 20%\", global hypokinesis, indeterminate diastolic function.  No evidence of apical thrombus with Definity contrast.  R, mildly dilated, systolic function moderately reduced.  Moderately dilated LA, mildly dilated RA.  Trace MR, mild TR, mild NE  Right atrial pressure estimated at 5 to 10 mmHg     2/25/2013 TTE at [facility]:  LVEF 60%, normal RV and atria     Frequent PVCs, NSVT  - K, Mag stable  -likely due to severely reduced LV function, ?possible scar mediated given large infarct on recent nuclear stress imaging.  -improved on tele with Toprol XL 25 Bid  -has LifeVest  -consider EP eval     Acute kidney injury (HCC)  Baseline seems to be 0.9-1.0 however over the past 2 months lingering 1.40s  1.6 yesterday-->1.40 today  Likely combo of CRS, low flow state     Hyponatremia  -likely due to severely reduced LV function, chronic HF  -126 on " "admit--improved but now down to 125 today     Benign essential hypertension     Transaminitis  Likely 2/2 congestive hepatopathy  improving     Hypercholesterolemia  On Pravachol     Anxiety  Per primary     Gastroesophageal reflux disease with esophagitis      Vitals:   Blood pressure 93/75, pulse (!) 41, temperature 97.5 °F (36.4 °C), temperature source Oral, resp. rate 18, height 5' 10\" (1.778 m), weight 95.3 kg (210 lb), SpO2 97%.    Body mass index is 30.13 kg/m².    I/O last 3 completed shifts:  In: 1212 [P.O.:1212]  Out: 4475 [Urine:4475]    Wt Readings from Last 10 Encounters:   24 95.3 kg (210 lb)   24 99.9 kg (220 lb 3.2 oz)   24 103 kg (227 lb 3.2 oz)   24 99.2 kg (218 lb 9.6 oz)   23 97.8 kg (215 lb 9.6 oz)   23 103 kg (226 lb)   23 98.8 kg (217 lb 12.8 oz)   08/15/22 98.6 kg (217 lb 6.4 oz)   22 102 kg (225 lb)   21 92.4 kg (203 lb 9.6 oz)     Vitals:    24 0606 24 0733 24 0800 24 1111   BP: 112/79 109/78  93/75   BP Location:  Right arm  Right arm   Pulse: 100   (!) 41   Resp:       Temp:  98.1 °F (36.7 °C)  97.5 °F (36.4 °C)   TempSrc:  Oral  Oral   SpO2: 100% (!) 75% 98% 97%   Weight:       Height:           Physical Exam  Constitutional:       Appearance: He is normal weight.   Neck:      Vascular: JVD present.   Cardiovascular:      Rate and Rhythm: Normal rate and regular rhythm. Extrasystoles are present.     Heart sounds: Normal heart sounds, S1 normal and S2 normal.   Pulmonary:      Effort: Respiratory distress present.      Breath sounds: Normal air entry. Decreased breath sounds present.      Comments: Occasional CAMERON with moderate activity  Abdominal:      General: There is distension.      Palpations: Abdomen is soft.      Comments: Reports bloating, SOB when bending at waist   Musculoskeletal:      Right lower le+ Edema present.      Left lower le+ Edema present.   Skin:     General: Skin is warm and dry. "   Neurological:      Mental Status: He is alert and oriented to person, place, and time. Mental status is at baseline.   Psychiatric:         Behavior: Behavior is cooperative.         Cognition and Memory: Cognition normal.           Current Facility-Administered Medications:     albuterol (PROVENTIL HFA,VENTOLIN HFA) inhaler 2 puff, 2 puff, Inhalation, 4x Daily, Nichole Meek MD, 2 puff at 12/23/24 0913    ALPRAZolam (XANAX) tablet 1 mg, 1 mg, Oral, TID PRN, Nichole Meek MD, 1 mg at 12/22/24 2158    aspirin chewable tablet 81 mg, 81 mg, Oral, Daily, Nichole Meek MD, 81 mg at 12/23/24 0913    Empagliflozin (JARDIANCE) tablet 10 mg, 10 mg, Oral, QAM, Nichole Meek MD, 10 mg at 12/23/24 0913    furosemide (LASIX) injection 80 mg, 80 mg, Intravenous, BID (diuretic), CANDY Nolasco    heparin (porcine) subcutaneous injection 5,000 Units, 5,000 Units, Subcutaneous, Q8H ALEJANDRO, 5,000 Units at 12/23/24 0549 **AND** [CANCELED] Platelet count, , , Once, Nichole Meek MD    metoprolol succinate (TOPROL-XL) 24 hr tablet 25 mg, 25 mg, Oral, BID, CANDY Borden, 25 mg at 12/23/24 0913    pantoprazole (PROTONIX) EC tablet 40 mg, 40 mg, Oral, Early Morning, Nichole Meek MD, 40 mg at 12/23/24 0550    potassium chloride (Klor-Con M20) CR tablet 20 mEq, 20 mEq, Oral, BID, Tahir Kumar MD, 20 mEq at 12/23/24 0913    pravastatin (PRAVACHOL) tablet 40 mg, 40 mg, Oral, Daily With Dinner, Nichole Meek MD, 40 mg at 12/22/24 1630    sacubitril-valsartan (ENTRESTO) 24-26 MG per tablet 1 tablet, 1 tablet, Oral, BID, CANDY Nolasco    Insert peripheral IV, , , Once **AND** sodium chloride (PF) 0.9 % injection 3 mL, 3 mL, Intravenous, Q1H PRN, Nichole Meek MD    Labs & Results:      Results from last 7 days   Lab Units 12/20/24  0552 12/19/24  0638 12/18/24  1410   WBC Thousand/uL 8.87 9.70 10.97*   HEMOGLOBIN g/dL 14.2 15.0 15.7   HEMATOCRIT % 43.9 45.6 48.9   PLATELETS  Thousands/uL 219 233 290         Results from last 7 days   Lab Units 12/23/24  0500 12/22/24  0806 12/21/24  0957 12/20/24  0552   POTASSIUM mmol/L 4.4 4.3 3.6 3.9   CHLORIDE mmol/L 91* 92* 93* 93*   CO2 mmol/L 22 27 23 23   BUN mg/dL 31* 33* 31* 31*   CREATININE mg/dL 1.41* 1.60* 1.42* 1.53*   CALCIUM mg/dL 9.2 9.1 9.0 8.8   ALK PHOS U/L 169* 167*  --  136*   ALT U/L 100* 77*  --  91*   AST U/L 70* 39  --  54*             Thank you for the opportunity to participate in the care of this patient.    CANDY Lara  Advanced Heart Failure  [facility]

## 2024-12-23 NOTE — PROGRESS NOTES
Patient listed on Advanced Heart Failure Census at [facility].     Outpatient Advanced Heart Failure LCSW completed electronic chart review and rounded with the HF Team.  Patient sitting in recliner chair and has STD paperwork from his employer. Dr. Grande examined patient and discussed Today's Plan.     Referral for outpatient social work not entered at this time.   Please enter referral if outpatient resources are needed in the future.

## 2024-12-23 NOTE — PLAN OF CARE
Problem: INFECTION - ADULT  Goal: Absence or prevention of progression during hospitalization  Description: INTERVENTIONS:  - Assess and monitor for signs and symptoms of infection  - Monitor lab/diagnostic results  - Monitor all insertion sites, i.e. indwelling lines, tubes, and drains  - Monitor endotracheal if appropriate and nasal secretions for changes in amount and color  - [place] appropriate cooling/warming therapies per order  - Administer medications as ordered  - Instruct and encourage patient and family to use good hand hygiene technique  - Identify and instruct in appropriate isolation precautions for identified infection/condition  Outcome: Progressing     Problem: SAFETY ADULT  Goal: Patient will remain free of falls  Description: INTERVENTIONS:  - Educate patient/family on patient safety including physical limitations  - Instruct patient to call for assistance with activity   - Consult OT/PT to assist with strengthening/mobility   - Keep Call bell within reach  - Keep bed low and locked with side rails adjusted as appropriate  - Keep care items and personal belongings within reach  - Initiate and maintain comfort rounds  - Make Fall Risk Sign visible to staff  - Offer Toileting every 2 Hours, in advance of need  - Initiate/Maintain bed alarm  - Obtain necessary fall risk management equipment:   - Apply yellow socks and bracelet for high fall risk patients  - Consider moving patient to room near nurses station  Outcome: Progressing

## 2024-12-23 NOTE — ASSESSMENT & PLAN NOTE
Wt Readings from Last 3 Encounters:   12/23/24 95.3 kg (210 lb)   09/24/24 99.9 kg (220 lb 3.2 oz)   02/27/24 103 kg (227 lb 3.2 oz)     In October patient presented to the ED with shortness of breath, found to have new onset heart failure  Echo showed ejection fraction of 20% and he has been following with [facility] since then.   Stress test showed large size, severe intensity infarct in the RCA/LCx territory   Did not have cardiac catheterization at that time but will agree to whatever testing is recommended now  Patient presents with shortness of breath on arrival (12/18/24)   Chest x-ray showed no vascular congestion but CT chest done and reviewed.   CT showed moderate pulm effusion in keeping with CHF and a RML pulmonary 3X3 cm mass--> patient aware that 3 month follow up is required   BNP was elevated at 1481, has AL and mild transaminitis.    Troponin x 2 negative, EKG without acute ischemic changes, has PVCs  In the past month medications were changed due to side effects, new medication introduced for GDMT  12/5/24 Lasix was decreased from 40 mg to 20 mg by cardiology but patient continued to take 40 mg daily, he was afraid he will develop lower extremity swelling  He was on Entresto until last week, due to cough it was switched to valsartan 40 mg daily  He is on spironolactone 12.5 mg daily, Jardiance 10 mg daily, metoprolol succinate 25 mg daily  Received a LifeVest on Friday  Follow daily weight and I/O closely  Weights are up today and Na is decreased  Continue IV diuresis  CMR when euvolemic  BMP daily

## 2024-12-23 NOTE — ED ATTENDING ATTESTATION
12/18/2024  I, Rene Bryan DO, saw and evaluated the patient. I have discussed the patient with the resident/non-physician practitioner and agree with the resident's/non-physician practitioner's findings, Plan of Care, and MDM as documented in the resident's/non-physician practitioner's note, except where noted. All available labs and Radiology studies were reviewed.  I was present for key portions of any procedure(s) performed by the resident/non-physician practitioner and I was immediately available to provide assistance.       At this point I agree with the current assessment done in the Emergency Department.  I have conducted an independent evaluation of this patient a history and physical is as follows:    ED Course  ED Course as of 12/23/24 0739   Wed Dec 18, 2024   1330 49 y.o. male patient presents with chief complaint of dyspnea, hx of heart failure on life vest. Dry cough.     Differential includes but is not limited to: atypical acs, chf, pneumonia, bronchitis, uri, pneumothorax, pleural effusion, less likely neoplasm, PE    Plan: cbc, metabolic panel, ekg, troponin, +/- ddimer/ct scan, chest xray, possible admission.        1456 Sodium(!): 126   1456 Creatinine(!): 1.67         Critical Care Time  Procedures

## 2024-12-24 LAB
ANION GAP SERPL CALCULATED.3IONS-SCNC: 12 MMOL/L (ref 4–13)
BUN SERPL-MCNC: 27 MG/DL (ref 5–25)
CALCIUM SERPL-MCNC: 9.1 MG/DL (ref 8.4–10.2)
CHLORIDE SERPL-SCNC: 91 MMOL/L (ref 96–108)
CO2 SERPL-SCNC: 26 MMOL/L (ref 21–32)
CREAT SERPL-MCNC: 1.35 MG/DL (ref 0.6–1.3)
GFR SERPL CREATININE-BSD FRML MDRD: 61 ML/MIN/1.73SQ M
GLUCOSE SERPL-MCNC: 89 MG/DL (ref 65–140)
MAGNESIUM SERPL-MCNC: 2.2 MG/DL (ref 1.9–2.7)
POTASSIUM SERPL-SCNC: 4 MMOL/L (ref 3.5–5.3)
POTASSIUM SERPL-SCNC: 4.3 MMOL/L (ref 3.5–5.3)
SODIUM SERPL-SCNC: 129 MMOL/L (ref 135–147)

## 2024-12-24 PROCEDURE — 84132 ASSAY OF SERUM POTASSIUM: CPT

## 2024-12-24 PROCEDURE — 80048 BASIC METABOLIC PNL TOTAL CA: CPT

## 2024-12-24 PROCEDURE — 83735 ASSAY OF MAGNESIUM: CPT

## 2024-12-24 PROCEDURE — 99232 SBSQ HOSP IP/OBS MODERATE 35: CPT

## 2024-12-24 PROCEDURE — 99232 SBSQ HOSP IP/OBS MODERATE 35: CPT | Performed by: FAMILY MEDICINE

## 2024-12-24 RX ADMIN — SACUBITRIL AND VALSARTAN 1 TABLET: 24; 26 TABLET, FILM COATED ORAL at 17:15

## 2024-12-24 RX ADMIN — HEPARIN SODIUM 5000 UNITS: 5000 INJECTION, SOLUTION INTRAVENOUS; SUBCUTANEOUS at 20:50

## 2024-12-24 RX ADMIN — HEPARIN SODIUM 5000 UNITS: 5000 INJECTION, SOLUTION INTRAVENOUS; SUBCUTANEOUS at 14:22

## 2024-12-24 RX ADMIN — ALPRAZOLAM 1 MG: 0.5 TABLET ORAL at 17:13

## 2024-12-24 RX ADMIN — PANTOPRAZOLE SODIUM 40 MG: 40 TABLET, DELAYED RELEASE ORAL at 04:56

## 2024-12-24 RX ADMIN — FUROSEMIDE 80 MG: 10 INJECTION, SOLUTION INTRAMUSCULAR; INTRAVENOUS at 09:02

## 2024-12-24 RX ADMIN — SACUBITRIL AND VALSARTAN 1 TABLET: 24; 26 TABLET, FILM COATED ORAL at 09:04

## 2024-12-24 RX ADMIN — PRAVASTATIN SODIUM 40 MG: 40 TABLET ORAL at 17:13

## 2024-12-24 RX ADMIN — ASPIRIN 81 MG CHEWABLE TABLET 81 MG: 81 TABLET CHEWABLE at 09:02

## 2024-12-24 RX ADMIN — ALBUTEROL SULFATE 2 PUFF: 90 AEROSOL, METERED RESPIRATORY (INHALATION) at 20:50

## 2024-12-24 RX ADMIN — HEPARIN SODIUM 5000 UNITS: 5000 INJECTION, SOLUTION INTRAVENOUS; SUBCUTANEOUS at 04:57

## 2024-12-24 RX ADMIN — EMPAGLIFLOZIN 10 MG: 10 TABLET, FILM COATED ORAL at 09:04

## 2024-12-24 RX ADMIN — ALBUTEROL SULFATE 2 PUFF: 90 AEROSOL, METERED RESPIRATORY (INHALATION) at 09:04

## 2024-12-24 RX ADMIN — METOPROLOL SUCCINATE 25 MG: 25 TABLET, EXTENDED RELEASE ORAL at 17:13

## 2024-12-24 RX ADMIN — FUROSEMIDE 80 MG: 10 INJECTION, SOLUTION INTRAMUSCULAR; INTRAVENOUS at 17:13

## 2024-12-24 RX ADMIN — METOPROLOL SUCCINATE 25 MG: 25 TABLET, EXTENDED RELEASE ORAL at 09:02

## 2024-12-24 NOTE — PROGRESS NOTES
Progress Note - Hospitalist   Name: Martell Mejía 49 y.o. male I MRN: [id number]  [facility] I Date of Admission: 12/18/2024   Date of Service: 12/24/2024 I Hospital Day: 6     Assessment & Plan  Acute on Chronic systolic heart failure (HCC)  Wt Readings from Last 3 Encounters:   12/24/24 92.8 kg (204 lb 9.4 oz)   09/24/24 99.9 kg (220 lb 3.2 oz)   02/27/24 103 kg (227 lb 3.2 oz)     In October patient presented to the ED with shortness of breath, found to have new onset heart failure  Echo showed ejection fraction of 20% and he has been following with [facility] since then.   Stress test showed large size, severe intensity infarct in the RCA/LCx territory   Did not have cardiac catheterization at that time but will agree to whatever testing is recommended now  Patient presents with shortness of breath on arrival (12/18/24)   Chest x-ray showed no vascular congestion but CT chest done and reviewed.   CT showed moderate pulm effusion in keeping with CHF and a RML pulmonary 3X3 cm mass--> patient aware that 3 month follow up is required   BNP was elevated at 1481, has AL and mild transaminitis.    Troponin x 2 negative, EKG without acute ischemic changes, has PVCs  In the past month medications were changed due to side effects, new medication introduced for GDMT  12/5/24 Lasix was decreased from 40 mg to 20 mg by cardiology but patient continued to take 40 mg daily, he was afraid he will develop lower extremity swelling  He was on Entresto until last week, due to cough it was switched to valsartan 40 mg daily-restarted back on the Entresto  He is on spironolactone 12.5 mg daily, Jardiance 10 mg daily, metoprolol succinate 25 mg daily  Received a LifeVest on Friday  Follow daily weight and I/O closely  Weights are up today and Na is decreased  Continue IV diuresis  CMR when euvolemic  BMP daily  As per cardiology progress note likely DC by tomorrow with high dose of p.o. Lasix    Dyspnea on exertion  Seems to be  ongoing since October  Most likely related to ischemic cardiomyopathy  Acute kidney injury (HCC)  Creatinine up until October was from 0.8-1.1  Increasing creatinine could be medication induced and intravascular volume depletion vs cardiorenal syndrome  Suspect cardio-renal syndrome  FeNa is consistent with prerenal/HF  No casts in urine  Monitor daily BMP   Hyponatremia  Likely due to CHF  Monitor with diuresis  Transaminitis  Mild transaminitis, due to CHF  Improving with diuresis  Anxiety  Continue Xanax 1 mg 3 times daily as needed  PDMP reviewed  Benign essential hypertension  Continue metoprolol succinate 25 mg daily  Holding ACEI/ARB due to elevated creatinine  Hypercholesterolemia  On Crestor 5 mg daily, substitute for pravastatin 40 mg daily  Gastroesophageal reflux disease with esophagitis  Continue pantoprazole 40 mg daily    VTE Pharmacologic Prophylaxis: VTE Score: 3 Moderate Risk (Score 3-4) - Pharmacological DVT Prophylaxis Ordered: heparin.    Mobility:   Basic Mobility Inpatient Raw Score: 24  JH-HLM Goal: 8: Walk 250 feet or more  JH-HLM Achieved: 4: Move to chair/commode  JH-HLM Goal achieved. Continue to encourage appropriate mobility.    Patient Centered Rounds: I performed bedside rounds with nursing staff today.   Discussions with Specialists or Other Care Team Provider:     Education and Discussions with Family / Patient: Updated patient    Current Length of Stay: 6 day(s)  Current Patient Status: Inpatient   Certification Statement: The patient will continue to require additional inpatient hospital stay due to IV diuresis  Discharge Plan: Anticipate discharge tomorrow to home.    Code Status: Level 1 - Full Code    Subjective   Patient seen and examined.  No specific complaints offered  Feeling better.  Patient reported that cardiology told him that he will be going home today and he is eager to go home    Objective :  Temp:  [97.5 °F (36.4 °C)-98.6 °F (37 °C)] 97.9 °F (36.6 °C)  HR:   [79-95] 95  BP: ()/(60-82) 110/82  Resp:  [18] 18  SpO2:  [91 %-100 %] 99 %  O2 Device: None (Room air)    Body mass index is 29.36 kg/m².     Input and Output Summary (last 24 hours):     Intake/Output Summary (Last 24 hours) at 12/24/2024 1617  Last data filed at 12/24/2024 1538  Gross per 24 hour   Intake 1112 ml   Output 5450 ml   Net -4338 ml       Physical Exam  Constitutional:       General: He is not in acute distress.     Appearance: He is not ill-appearing.   HENT:      Head: Normocephalic and atraumatic.      Nose: Nose normal.   Eyes:      General: No scleral icterus.  Cardiovascular:      Pulses: Normal pulses.      Heart sounds: Normal heart sounds.   Pulmonary:      Effort: No respiratory distress.   Abdominal:      General: Abdomen is flat. There is no distension.   Musculoskeletal:         General: No swelling. Normal range of motion.      Cervical back: Normal range of motion.      Right lower leg: Edema present.      Left lower leg: Edema present.   Skin:     General: Skin is warm.   Neurological:      Mental Status: He is alert.           Lines/Drains:        Telemetry:  Telemetry Orders (From admission, onward)               24 Hour Telemetry Monitoring  Continuous x 24 Hours (Telem)        Expiring   Question:  Reason for 24 Hour Telemetry  Answer:  Decompensated CHF- and any one of the following: continuous diuretic infusion or total diuretic dose >200 mg daily, associated electrolyte derangement (I.e. K < 3.0), inotropic drip (continuous infusion), hx of ventricular arrhythmia, or new EF < 35%        LifeVest Patient: Continuous Telemetry Monitoring during hospitalization (non-expiring)  Continuous LifeVest Telemetry Monitoring        References:    LifeVest Policy                     Telemetry Reviewed: Normal Sinus Rhythm  Indication for Continued Telemetry Use: Lifevest (remains on tele entire hospital stay)               Lab Results: I have reviewed the following results:   Results  from last 7 days   Lab Units 12/20/24  0552   WBC Thousand/uL 8.87   HEMOGLOBIN g/dL 14.2   HEMATOCRIT % 43.9   PLATELETS Thousands/uL 219   SEGS PCT % 62   LYMPHO PCT % 25   MONO PCT % 8   EOS PCT % 3     Results from last 7 days   Lab Units 12/24/24  1400 12/24/24  0504 12/23/24  0500   SODIUM mmol/L  --  129* 125*   POTASSIUM mmol/L 4.3 4.0 4.4   CHLORIDE mmol/L  --  91* 91*   CO2 mmol/L  --  26 22   BUN mg/dL  --  27* 31*   CREATININE mg/dL  --  1.35* 1.41*   ANION GAP mmol/L  --  12 12   CALCIUM mg/dL  --  9.1 9.2   ALBUMIN g/dL  --   --  3.9   TOTAL BILIRUBIN mg/dL  --   --  1.37*   ALK PHOS U/L  --   --  169*   ALT U/L  --   --  100*   AST U/L  --   --  70*   GLUCOSE RANDOM mg/dL  --  89 123             Results from last 7 days   Lab Units 12/20/24  0552   HEMOGLOBIN A1C % 6.5*           Recent Cultures (last 7 days):         Imaging Results Review: I reviewed radiology reports from this admission including: CT chest.      Last 24 Hours Medication List:     Current Facility-Administered Medications:     albuterol (PROVENTIL HFA,VENTOLIN HFA) inhaler 2 puff, 4x Daily    ALPRAZolam (XANAX) tablet 1 mg, TID PRN    aspirin chewable tablet 81 mg, Daily    Empagliflozin (JARDIANCE) tablet 10 mg, QAM    furosemide (LASIX) injection 80 mg, BID (diuretic)    heparin (porcine) subcutaneous injection 5,000 Units, Q8H ALEJANDRO **AND** [CANCELED] Platelet count, Once    metoprolol succinate (TOPROL-XL) 24 hr tablet 25 mg, BID    pantoprazole (PROTONIX) EC tablet 40 mg, Early Morning    pravastatin (PRAVACHOL) tablet 40 mg, Daily With Dinner    sacubitril-valsartan (ENTRESTO) 24-26 MG per tablet 1 tablet, BID    Insert peripheral IV, Once **AND** sodium chloride (PF) 0.9 % injection 3 mL, Q1H PRN    Administrative Statements   Today, Patient Was Seen By: Charlette Snowden MD      **Please Note: This note may have been constructed using a voice recognition system.**

## 2024-12-24 NOTE — CASE MANAGEMENT
Case Management Discharge Planning Note    Patient name Martell Mejía  Location [facility] MRN [id number]  : 1975 Date 2024       Current Admission Date: 2024  Current Admission Diagnosis:Acute on Chronic systolic heart failure (HCC)   Patient Active Problem List    Diagnosis Date Noted Date Diagnosed    Acute on Chronic systolic heart failure (HCC) 2024     Acute kidney injury (HCC) 2024     Hyponatremia 2024     Transaminitis 2024     Dyspnea on exertion 2024     Attention deficit hyperactivity disorder (ADHD), combined type 2023     COVID-19 08/15/2022     Allergic contact dermatitis 2021     Tick bite 2019     Irritable bowel syndrome with diarrhea 2018     Gastroesophageal reflux disease with esophagitis 2018     Anxiety 01/15/2016     Hypercholesterolemia 2014     Acute gastritis 2014     Benign essential hypertension 2013       LOS (days): 6  Geometric Mean LOS (GMLOS) (days):   Days to GMLOS:     OBJECTIVE:  Risk of Unplanned Readmission Score: 13.02         Current admission status: Inpatient   Preferred Pharmacy:   [id number] - [place], [place] - [address] [address]  Phone: [phone] Fax: [phone]    Primary Care Provider: Jesus Grajeda DO    Primary Insurance: [employer]ARNESTINE  Secondary Insurance:     DISCHARGE DETAILS:    Per chart review, patient is not medically cleared at this time. CM team will continue to follow for discharge planning needs.

## 2024-12-24 NOTE — ASSESSMENT & PLAN NOTE
Wt Readings from Last 3 Encounters:   12/24/24 92.8 kg (204 lb 9.4 oz)   09/24/24 99.9 kg (220 lb 3.2 oz)   02/27/24 103 kg (227 lb 3.2 oz)     In October patient presented to the ED with shortness of breath, found to have new onset heart failure  Echo showed ejection fraction of 20% and he has been following with [facility] since then.   Stress test showed large size, severe intensity infarct in the RCA/LCx territory   Did not have cardiac catheterization at that time but will agree to whatever testing is recommended now  Patient presents with shortness of breath on arrival (12/18/24)   Chest x-ray showed no vascular congestion but CT chest done and reviewed.   CT showed moderate pulm effusion in keeping with CHF and a RML pulmonary 3X3 cm mass--> patient aware that 3 month follow up is required   BNP was elevated at 1481, has AL and mild transaminitis.    Troponin x 2 negative, EKG without acute ischemic changes, has PVCs  In the past month medications were changed due to side effects, new medication introduced for GDMT  12/5/24 Lasix was decreased from 40 mg to 20 mg by cardiology but patient continued to take 40 mg daily, he was afraid he will develop lower extremity swelling  He was on Entresto until last week, due to cough it was switched to valsartan 40 mg daily-restarted back on the Entresto  He is on spironolactone 12.5 mg daily, Jardiance 10 mg daily, metoprolol succinate 25 mg daily  Received a LifeVest on Friday  Follow daily weight and I/O closely  Weights are up today and Na is decreased  Continue IV diuresis  CMR when euvolemic  BMP daily  As per cardiology progress note likely DC by tomorrow with high dose of p.o. Lasix

## 2024-12-24 NOTE — PROGRESS NOTES
Advanced Heart Failure / Pulmonary Hypertension Service Progress Note    Martell Mejía 49 y.o. male   MRN: [id number]  Unit/Bed#: [facility]-01; Encounter: [id number]    Assessment:  Principal Problem:    Acute on Chronic systolic heart failure (HCC)  Active Problems:    Anxiety    Benign essential hypertension    Hypercholesterolemia    Gastroesophageal reflux disease with esophagitis    Acute kidney injury (HCC)    Hyponatremia    Transaminitis    Dyspnea on exertion    Martell is a 49-year-old male with a past medical history of hypertension, hyperlipidemia, HFrEF 20%, and a reported history of multiple congenital ventricular septal defects status post repaired. Had an NPI at [facility] in Nov this year suggestive of ischemia, but uncertain--no cardiac cath at that time. GDMT started at that time by [facility].  He presented to the ED for symptoms of shortness of breath. BNP 1481, Trops peak 36, , creat 1.69. CXR left pleural effusion.  CTA--Moderate bilateral pleural effusions, questionable atelectasis versus mass in the right middle lobe       Subjective:   Patient seen and examined. No significant events overnight. Pt states his breathing is much better.  Denies cough after starting Entresto.  Anticipating discharge tomorrow for mandatory court date on Thursday.    Objective:   Intake/ Output: 1582/ 4350 (-2.7 L)  Weight:  Admit 218 bed--208 stndg-->210 lb (+2 lbs)-->203 lbs (-7 lbs)  Telemetry: SR, occasional NSVT, triplets    Today's Plan:  Continue Lasix 80 IV BID + Kdur 40 daily -- better UO response with increased diuretic dose. UO net - 2.7, down 7 lbs, NA/creat improving.  Plan to change to PO diuretic tomorrow   Continue Toprol XL 25 BID.  Jaridance 10 daily.  Entresto 24-26 BID Likely add spironolactone in future--BP's soft in 90's  Etiology of HFrEF still unclear--MRI done and pending  Repeat BMP in a.m.  Continue 2000 cc FR, daily standing weights  Plan is to DC patient tomorrow on higher dose of PO  "Lasix.  LifeVest in room-- pt is compliant.    Plan:  Acute on chronic HFrEF, LVEF 15%  -Etiology Unclear. Prior  infarct noted on NPI but felt to nonischemic contribution.  CMRI pending     Volume status:  JVP still up but improving.  Weight down 7 lbs.  Will continue current IV diuresis and transition to PO tomorrow     Neurohormonal Blockade:  --Beta-Blocker: Toprol XL 25 mg QD--->increase to BID  --ACEi, ARB or ARNi: -->Entresto 24-26 BID  --Aldosterone Receptor Blocker: Aldactone 12.5 mg QD (on Hold)  --SGLT2i: Jardiance 10 mg QD  --Diuretic:   Home diuretic: Lasix 40mg QD PO    Inpatient: Lasix 40 mg IV BID--> 80 IV lasix BID + Kdur 40 mEQ     Sudden Cardiac Death Risk Reduction:  --ICD: LVEF <20% Lifevest from [facility]     Electrical Resynchronization:  --Candidacy for BiV device: Will continue to monitor     Advanced Therapies: Will continue to monitor.   --Inotrope:   --LVAD/Transplant Candidacy: THC+      TTE 12/19/24: LVEF 15%, LVIDd 5.8 cm, RV function reduced, mild to mod TR, mild MR, est RAP 10 mmHg,      11/21/2024 nuclear pharmacologic stress test performed at [facility]:  There is a fixed, large size, severe intensity perfusion defect in the basal to apical inferior, inferoseptal and inferolateral wall segments, which does not improve with pronation. In the presence of akinesis, this is suggestive of infarct.    11/26/2024 TTE at [facility]:  LVEF \"less than 20%\", global hypokinesis, indeterminate diastolic function.  No evidence of apical thrombus with Definity contrast.  R, mildly dilated, systolic function moderately reduced.  Moderately dilated LA, mildly dilated RA.  Trace MR, mild TR, mild MD  Right atrial pressure estimated at 5 to 10 mmHg     2/25/2013 TTE at [facility]:  LVEF 60%, normal RV and atria     Frequent PVCs, NSVT  - K, Mag stable  -likely due to severely reduced LV function, ?possible scar mediated given large infarct on recent nuclear stress imaging.  -improved on tele with Toprol XL 25 Bid  -has " "LifeVest  -consider EP eval     Acute kidney injury (HCC)  Baseline seems to be 0.9-1.0 however over the past 2 months lingering 1.40s  1.6 yesterday-->1.40--> 1.35 today.    NA improved to  129 today  Likely combo of CRS, low flow state     Hyponatremia  -likely due to severely reduced LV function, chronic HF  -126 on admit-- 125 --129 today     Benign essential hypertension     Transaminitis  Likely 2/2 congestive hepatopathy  improving     Hypercholesterolemia  On Pravachol     Anxiety  Per primary     Gastroesophageal reflux disease with esophagitis       Vitals:   Blood pressure 92/62, pulse 85, temperature 97.9 °F (36.6 °C), temperature source Oral, resp. rate 18, height 5' 10\" (1.778 m), weight 92.2 kg (203 lb 3.2 oz), SpO2 97%.    Body mass index is 29.16 kg/m².    I/O last 3 completed shifts:  In: 2152 [P.O.:2152]  Out: 5475 [Urine:5475]    Wt Readings from Last 10 Encounters:   12/24/24 92.2 kg (203 lb 3.2 oz)   09/24/24 99.9 kg (220 lb 3.2 oz)   02/27/24 103 kg (227 lb 3.2 oz)   01/09/24 99.2 kg (218 lb 9.6 oz)   12/07/23 97.8 kg (215 lb 9.6 oz)   08/09/23 103 kg (226 lb)   01/30/23 98.8 kg (217 lb 12.8 oz)   08/15/22 98.6 kg (217 lb 6.4 oz)   02/01/22 102 kg (225 lb)   07/22/21 92.4 kg (203 lb 9.6 oz)     Vitals:    12/24/24 0038 12/24/24 0511 12/24/24 0715 12/24/24 0717   BP: 97/61  94/60 92/62   BP Location:    Left arm   Pulse: 79  86 85   Resp:    18   Temp: 98.6 °F (37 °C)  97.9 °F (36.6 °C) 97.9 °F (36.6 °C)   TempSrc:    Oral   SpO2: 96%  91% 97%   Weight:  92.2 kg (203 lb 3.2 oz)     Height:           Physical Exam  Constitutional:       Appearance: He is normal weight.   Neck:      Vascular: JVD present.      Comments: Jvd  up but improving  Cardiovascular:      Rate and Rhythm: Normal rate and regular rhythm. Extrasystoles are present.     Pulses:           Radial pulses are 2+ on the right side and 2+ on the left side.      Heart sounds: Normal heart sounds, S1 normal and S2 normal.      " Comments: LE edema improving  Pulmonary:      Effort: No respiratory distress.      Breath sounds: Normal air entry. Decreased breath sounds present.      Comments: CAMERON improved, orthopnea improved  Abdominal:      Palpations: Abdomen is soft.   Musculoskeletal:      Right lower le+ Edema present.      Left lower le+ Edema present.   Skin:     General: Skin is warm and dry.   Neurological:      Mental Status: He is alert and oriented to person, place, and time. Mental status is at baseline.   Psychiatric:         Behavior: Behavior is cooperative.         Cognition and Memory: Cognition normal.           Current Facility-Administered Medications:     albuterol (PROVENTIL HFA,VENTOLIN HFA) inhaler 2 puff, 2 puff, Inhalation, 4x Daily, Nichole Meek MD, 2 puff at 24 2208    ALPRAZolam (XANAX) tablet 1 mg, 1 mg, Oral, TID PRN, Nichole Meek MD, 1 mg at 24 215    aspirin chewable tablet 81 mg, 81 mg, Oral, Daily, Nichole Meek MD, 81 mg at 24 0913    Empagliflozin (JARDIANCE) tablet 10 mg, 10 mg, Oral, QAM, Nichole Meek MD, 10 mg at 24 0913    furosemide (LASIX) injection 80 mg, 80 mg, Intravenous, BID (diuretic), CANDY Nolasco, 80 mg at 24 1722    heparin (porcine) subcutaneous injection 5,000 Units, 5,000 Units, Subcutaneous, Q8H ALEJANDRO, 5,000 Units at 24 0457 **AND** [CANCELED] Platelet count, , , Once, Nichole Meek MD    metoprolol succinate (TOPROL-XL) 24 hr tablet 25 mg, 25 mg, Oral, BID, CANDY Borden, 25 mg at 24 1719    pantoprazole (PROTONIX) EC tablet 40 mg, 40 mg, Oral, Early Morning, Nichole Meek MD, 40 mg at 24 0456    pravastatin (PRAVACHOL) tablet 40 mg, 40 mg, Oral, Daily With Dinner, Nichole Meek MD, 40 mg at 24 1719    sacubitril-valsartan (ENTRESTO) 24-26 MG per tablet 1 tablet, 1 tablet, Oral, BID, CANDY Nolasco, 1 tablet at 24 1719    Insert peripheral IV, , , Once  **AND** sodium chloride (PF) 0.9 % injection 3 mL, 3 mL, Intravenous, Q1H PRN, Nichole Meek MD    Labs & Results:      Results from last 7 days   Lab Units 12/20/24  0552 12/19/24  0638 12/18/24  1410   WBC Thousand/uL 8.87 9.70 10.97*   HEMOGLOBIN g/dL 14.2 15.0 15.7   HEMATOCRIT % 43.9 45.6 48.9   PLATELETS Thousands/uL 219 233 290         Results from last 7 days   Lab Units 12/24/24  0504 12/23/24  0500 12/22/24  0806 12/21/24  0957 12/20/24  0552   POTASSIUM mmol/L 4.0 4.4 4.3   < > 3.9   CHLORIDE mmol/L 91* 91* 92*   < > 93*   CO2 mmol/L 26 22 27   < > 23   BUN mg/dL 27* 31* 33*   < > 31*   CREATININE mg/dL 1.35* 1.41* 1.60*   < > 1.53*   CALCIUM mg/dL 9.1 9.2 9.1   < > 8.8   ALK PHOS U/L  --  169* 167*  --  136*   ALT U/L  --  100* 77*  --  91*   AST U/L  --  70* 39  --  54*    < > = values in this interval not displayed.             Thank you for the opportunity to participate in the care of this patient.    CANDY Lara  Advanced Heart Failure  [facility]

## 2024-12-25 VITALS
BODY MASS INDEX: 28.95 KG/M2 | DIASTOLIC BLOOD PRESSURE: 65 MMHG | OXYGEN SATURATION: 98 % | HEIGHT: 70 IN | HEART RATE: 83 BPM | SYSTOLIC BLOOD PRESSURE: 104 MMHG | RESPIRATION RATE: 18 BRPM | WEIGHT: 202.2 LBS | TEMPERATURE: 98.3 F

## 2024-12-25 LAB
ANION GAP SERPL CALCULATED.3IONS-SCNC: 11 MMOL/L (ref 4–13)
BUN SERPL-MCNC: 28 MG/DL (ref 5–25)
CALCIUM SERPL-MCNC: 9 MG/DL (ref 8.4–10.2)
CHLORIDE SERPL-SCNC: 91 MMOL/L (ref 96–108)
CO2 SERPL-SCNC: 30 MMOL/L (ref 21–32)
CREAT SERPL-MCNC: 1.32 MG/DL (ref 0.6–1.3)
GFR SERPL CREATININE-BSD FRML MDRD: 62 ML/MIN/1.73SQ M
GLUCOSE SERPL-MCNC: 98 MG/DL (ref 65–140)
POTASSIUM SERPL-SCNC: 3.7 MMOL/L (ref 3.5–5.3)
SODIUM SERPL-SCNC: 132 MMOL/L (ref 135–147)

## 2024-12-25 PROCEDURE — 99232 SBSQ HOSP IP/OBS MODERATE 35: CPT | Performed by: INTERNAL MEDICINE

## 2024-12-25 PROCEDURE — 80048 BASIC METABOLIC PNL TOTAL CA: CPT

## 2024-12-25 PROCEDURE — 99239 HOSP IP/OBS DSCHRG MGMT >30: CPT | Performed by: FAMILY MEDICINE

## 2024-12-25 RX ORDER — FUROSEMIDE 20 MG/1
40 TABLET ORAL DAILY
Qty: 60 TABLET | Refills: 0 | Status: SHIPPED | OUTPATIENT
Start: 2024-12-25

## 2024-12-25 RX ORDER — METOPROLOL SUCCINATE 25 MG/1
25 TABLET, EXTENDED RELEASE ORAL 2 TIMES DAILY
Qty: 60 TABLET | Refills: 0 | Status: SHIPPED | OUTPATIENT
Start: 2024-12-25

## 2024-12-25 RX ORDER — FUROSEMIDE 20 MG/1
20 TABLET ORAL DAILY
Status: DISCONTINUED | OUTPATIENT
Start: 2024-12-26 | End: 2024-12-25 | Stop reason: HOSPADM

## 2024-12-25 RX ORDER — FUROSEMIDE 20 MG/1
40 TABLET ORAL DAILY
Qty: 30 TABLET | Refills: 0 | Status: SHIPPED | OUTPATIENT
Start: 2024-12-25 | End: 2024-12-25

## 2024-12-25 RX ADMIN — EMPAGLIFLOZIN 10 MG: 10 TABLET, FILM COATED ORAL at 08:45

## 2024-12-25 RX ADMIN — ASPIRIN 81 MG CHEWABLE TABLET 81 MG: 81 TABLET CHEWABLE at 08:45

## 2024-12-25 RX ADMIN — HEPARIN SODIUM 5000 UNITS: 5000 INJECTION, SOLUTION INTRAVENOUS; SUBCUTANEOUS at 14:05

## 2024-12-25 RX ADMIN — HEPARIN SODIUM 5000 UNITS: 5000 INJECTION, SOLUTION INTRAVENOUS; SUBCUTANEOUS at 05:15

## 2024-12-25 RX ADMIN — PANTOPRAZOLE SODIUM 40 MG: 40 TABLET, DELAYED RELEASE ORAL at 05:15

## 2024-12-25 NOTE — PROGRESS NOTES
Advanced Heart Failure / Pulmonary Hypertension Service Progress Note    Martell Mejía 49 y.o. male   MRN: [id number]  Unit/Bed#: [facility] 421-01; Encounter: [id number]    Assessment:  Principal Problem:    Acute on Chronic systolic heart failure (HCC)  Active Problems:    Anxiety    Benign essential hypertension    Hypercholesterolemia    Gastroesophageal reflux disease with esophagitis    Acute kidney injury (HCC)    Hyponatremia    Transaminitis    Dyspnea on exertion    Martell is a 49-year-old male with a past medical history of hypertension, hyperlipidemia, HFrEF 20%, and a reported history of multiple congenital ventricular septal defects status post repaired. Had an NPI at [facility] in Nov this year suggestive of ischemia, but uncertain--no cardiac cath at that time. GDMT started at that time by [facility].  He presented to the ED for symptoms of shortness of breath. BNP 1481, Trops peak 36, , creat 1.69. CXR left pleural effusion.  CTA--Moderate bilateral pleural effusions, questionable atelectasis versus mass in the right middle lobe      Subjective:   Patient seen and examined. No significant events overnight. Feeling well overall and ready to discharge home    Objective:   Intake/ Output: 970/ 2950 (-1.9)  Weight: Admit 218 bed--208 stndg-->210 lb (+2 lbs)-->203 lbs (-7 lbs)--202 lbs standing (-1 lb)  Telemetry: SR HR 80-90.  PVC's, NSVT    Today's Plan:  LABS: NA up 132, K 3.7, creat stable 1.3, bun up 28  Hold diuretic today--BP's soft . Denies dizziness. Plan is to transition to PO Lasix at home--will hold for today and continue GDMT tonight once BP's increase. Encouraged to increase PO intake  Continue Toprol XL 25 BID.  Jaridance 10 daily.  Entresto 24-26 BID Likely add spironolactone in future--BP's soft   Etiology of HFrEF still unclear--MRI done and pending  Continue 2000 cc FR, daily standing weights  Plan is to DC patient today on Lasix 20 mg daily with PRN dose in afternoon.  May be able to  "stay at low dose w/GDMT on board.  LifeVest in room-- should wear at discharge        Plan:  Acute on chronic HFrEF, LVEF 15%  -Etiology Unclear. Prior  infarct noted on NPI but felt to nonischemic contribution.  CMRI pending     Volume status:  JVP improving.  About 7-8 cm  LE edema improved.  8 lb weight loss in 2 days.  BP's soft-- will hold diuretics today, encourage PO intake and restart at home tomorrow     Neurohormonal Blockade:  --Beta-Blocker: Toprol XL 25 mg QD--->increase to BID  --ACEi, ARB or ARNi: -->Entresto 24-26 BID  --Aldosterone Receptor Blocker: Aldactone 12.5 mg QD (on Hold)  --SGLT2i: Jardiance 10 mg QD  --Diuretic:   Home diuretic: Lasix 40mg QD PO --increase to BID at DC   Inpatient: Lasix 40 mg IV BID--> 80 IV lasix BID + Kdur 40 mEQ     Sudden Cardiac Death Risk Reduction:  --ICD: LVEF <20% Lifevest from [facility]     Electrical Resynchronization:  --Candidacy for BiV device: Will continue to monitor     Advanced Therapies: Will continue to monitor.   --Inotrope:   --LVAD/Transplant Candidacy: THC+      TTE 12/19/24: LVEF 15%, LVIDd 5.8 cm, RV function reduced, mild to mod TR, mild MR, est RAP 10 mmHg,      11/21/2024 nuclear pharmacologic stress test performed at [facility]:  There is a fixed, large size, severe intensity perfusion defect in the basal to apical inferior, inferoseptal and inferolateral wall segments, which does not improve with pronation. In the presence of akinesis, this is suggestive of infarct.    11/26/2024 TTE at [facility]:  LVEF \"less than 20%\", global hypokinesis, indeterminate diastolic function.  No evidence of apical thrombus with Definity contrast.  R, mildly dilated, systolic function moderately reduced.  Moderately dilated LA, mildly dilated RA.  Trace MR, mild TR, mild WA  Right atrial pressure estimated at 5 to 10 mmHg     2/25/2013 TTE at [facility]:  LVEF 60%, normal RV and atria     Frequent PVCs, NSVT  - K, Mag stable  -likely due to severely reduced LV function, ?possible scar " "mediated given large infarct on recent nuclear stress imaging.  -improved on tele with Toprol XL 25 Bid  -has LifeVest  -consider EP eval     Acute kidney injury (HCC)  Baseline seems to be 0.9-1.0 however over the past 2 months lingering 1.40s  1.6 yesterday-->1.40--> 1.35--1.32 today.    NA improved to  129-->132 today  Likely combo of CRS, low flow state     Hyponatremia  -likely due to severely reduced LV function, chronic HF  -126 on admit-- 125 --129 today     Benign essential hypertension     Transaminitis  Likely 2/2 congestive hepatopathy  improving     Hypercholesterolemia  On Pravachol     Anxiety  Per primary     Gastroesophageal reflux disease with esophagitis       Vitals:   Blood pressure 90/63, pulse 83, temperature 98 °F (36.7 °C), resp. rate 20, height 5' 10\" (1.778 m), weight 91.7 kg (202 lb 3.2 oz), SpO2 97%.    Body mass index is 29.01 kg/m².    I/O last 3 completed shifts:  In: 1370 [P.O.:1370]  Out: 5400 [Urine:5400]    Wt Readings from Last 10 Encounters:   12/25/24 91.7 kg (202 lb 3.2 oz)   09/24/24 99.9 kg (220 lb 3.2 oz)   02/27/24 103 kg (227 lb 3.2 oz)   01/09/24 99.2 kg (218 lb 9.6 oz)   12/07/23 97.8 kg (215 lb 9.6 oz)   08/09/23 103 kg (226 lb)   01/30/23 98.8 kg (217 lb 12.8 oz)   08/15/22 98.6 kg (217 lb 6.4 oz)   02/01/22 102 kg (225 lb)   07/22/21 92.4 kg (203 lb 9.6 oz)     Vitals:    12/25/24 0209 12/25/24 0554 12/25/24 0708 12/25/24 0708   BP: 93/57  90/63 90/63   BP Location:       Pulse: 81  81 83   Resp: 20      Temp: 98.2 °F (36.8 °C)  98 °F (36.7 °C) 98 °F (36.7 °C)   TempSrc:       SpO2: 97%  97% 97%   Weight:  91.7 kg (202 lb 3.2 oz)     Height:           Physical Exam  Constitutional:       Appearance: He is normal weight.   Eyes:      Extraocular Movements: Extraocular movements intact.   Neck:      Vascular: JVD present.      Comments: JVD improving, still elevated to 7-8  Cardiovascular:      Rate and Rhythm: Normal rate and regular rhythm. Extrasystoles are " present.     Heart sounds: Normal heart sounds, S1 normal and S2 normal.      Comments: Trace LE NP  Pulmonary:      Effort: No respiratory distress.      Breath sounds: Normal air entry. Decreased breath sounds present.   Musculoskeletal:      Right lower leg: No edema.      Left lower leg: No edema.   Skin:     General: Skin is warm and dry.   Neurological:      Mental Status: He is alert and oriented to person, place, and time. Mental status is at baseline.   Psychiatric:         Behavior: Behavior is cooperative.         Cognition and Memory: Cognition normal.           Current Facility-Administered Medications:     albuterol (PROVENTIL HFA,VENTOLIN HFA) inhaler 2 puff, 2 puff, Inhalation, 4x Daily, Nichole Meek MD, 2 puff at 12/24/24 2050    ALPRAZolam (XANAX) tablet 1 mg, 1 mg, Oral, TID PRN, Nichole Meek MD, 1 mg at 12/24/24 1713    aspirin chewable tablet 81 mg, 81 mg, Oral, Daily, Nichole Meek MD, 81 mg at 12/24/24 0902    Empagliflozin (JARDIANCE) tablet 10 mg, 10 mg, Oral, QAM, Nichole Meek MD, 10 mg at 12/24/24 0904    furosemide (LASIX) injection 80 mg, 80 mg, Intravenous, BID (diuretic), CANDY Nolasco, 80 mg at 12/24/24 1713    heparin (porcine) subcutaneous injection 5,000 Units, 5,000 Units, Subcutaneous, Q8H ALEJANDRO, 5,000 Units at 12/25/24 0515 **AND** [CANCELED] Platelet count, , , Once, Nichole Meek MD    metoprolol succinate (TOPROL-XL) 24 hr tablet 25 mg, 25 mg, Oral, BID, CANDY Borden, 25 mg at 12/24/24 1713    pantoprazole (PROTONIX) EC tablet 40 mg, 40 mg, Oral, Early Morning, Nichole Meek MD, 40 mg at 12/25/24 0515    pravastatin (PRAVACHOL) tablet 40 mg, 40 mg, Oral, Daily With Dinner, Nichole Meek MD, 40 mg at 12/24/24 1713    sacubitril-valsartan (ENTRESTO) 24-26 MG per tablet 1 tablet, 1 tablet, Oral, BID, CANDY Nolasco, 1 tablet at 12/24/24 1627    Insert peripheral IV, , , Once **AND** sodium chloride (PF) 0.9 % injection  3 mL, 3 mL, Intravenous, Q1H PRN, Nichole Meek MD    Labs & Results:      Results from last 7 days   Lab Units 12/20/24  0552 12/19/24  0638 12/18/24  1410   WBC Thousand/uL 8.87 9.70 10.97*   HEMOGLOBIN g/dL 14.2 15.0 15.7   HEMATOCRIT % 43.9 45.6 48.9   PLATELETS Thousands/uL 219 233 290         Results from last 7 days   Lab Units 12/25/24  0519 12/24/24  1400 12/24/24  0504 12/23/24  0500 12/22/24  0806 12/21/24  0957 12/20/24  0552   POTASSIUM mmol/L 3.7 4.3 4.0 4.4 4.3   < > 3.9   CHLORIDE mmol/L 91*  --  91* 91* 92*   < > 93*   CO2 mmol/L 30  --  26 22 27   < > 23   BUN mg/dL 28*  --  27* 31* 33*   < > 31*   CREATININE mg/dL 1.32*  --  1.35* 1.41* 1.60*   < > 1.53*   CALCIUM mg/dL 9.0  --  9.1 9.2 9.1   < > 8.8   ALK PHOS U/L  --   --   --  169* 167*  --  136*   ALT U/L  --   --   --  100* 77*  --  91*   AST U/L  --   --   --  70* 39  --  54*    < > = values in this interval not displayed.             Thank you for the opportunity to participate in the care of this patient.    CANDY Lara  Advanced Heart Failure  [facility]

## 2024-12-25 NOTE — DISCHARGE INSTR - AVS FIRST PAGE
Check your weight daily.  If the weight increases more than 3 pounds overnight or 5 pounds in a week or lower extremity edema or shortness of breath please contact cardiology office.    BMP in a week.  Arrange through primary care physician

## 2024-12-26 ENCOUNTER — TRANSITIONAL CARE MANAGEMENT (OUTPATIENT)
Age: 49
End: 2024-12-26

## 2024-12-26 ENCOUNTER — TELEPHONE (OUTPATIENT)
Age: 49
End: 2024-12-26

## 2024-12-26 LAB
1OH-MIDAZOLAM UR CFM-MCNC: NOT DETECTED NG/MG CREAT
6MAM UR QL SCN: NEGATIVE NG/ML
7AMINOCLONAZEPAM UR CFM-MCNC: NOT DETECTED NG/MG CREAT
A-OH ALPRAZ UR QL CFM: NOT DETECTED NG/MG CREAT
ACCEPTABLE CREAT UR QL: 28 MG/DL
ALPRAZ/CREAT UR CFM: NOT DETECTED NG/MG CREAT
AMPHET UR QL SCN: NEGATIVE NG/ML
BARBITURATES UR QL SCN: NEGATIVE NG/ML
BENZODIAZ UR QL SCN: NORMAL NG/ML
BENZODIAZEPINES: NEGATIVE
BUPRENORPHINE UR QL CFM: NEGATIVE NG/ML
CANNABINOIDS SERPLBLD QL SCN: ABNORMAL
CANNABINOIDS UR QL SCN: NORMAL NG/ML
CANNABINOIDS/CREAT UR: 111 NG/MG CREAT
CARISOPRODOL UR QL: NEGATIVE NG/ML
CLONAZEPAM/CREAT UR CFM: NOT DETECTED NG/MG CREAT
COCAINE+BZE UR QL SCN: NEGATIVE NG/ML
DIAZEPAM/CREAT UR: NOT DETECTED NG/MG CREAT
ETHYL GLUCURONIDE UR QL SCN: NEGATIVE NG/ML
FENTANYL UR QL SCN: NEGATIVE NG/ML
FLUNITRAZEPAM UR-MCNC: NOT DETECTED NG/MG CREAT
GABAPENTIN SERPLBLD QL SCN: NEGATIVE UG/ML
LORAZEPAM UR CFM-MCNC: NOT DETECTED NG/MG CREAT
METHADONE UR QL SCN: NEGATIVE NG/ML
MIDAZOLAM/CREAT UR CFM: NOT DETECTED NG/MG CREAT
NITRITE UR QL STRIP: NEGATIVE UG/ML
NORDIAZEPAM UR CFM-MCNC: NOT DETECTED NG/MG CREAT
NORFLUNITRAZEPAM UR-MCNC: NOT DETECTED NG/MG CREAT
OH-ETHYLFLURAZ UR CFM-MCNC: NOT DETECTED NG/MG CREAT
OH-TRIAZOLAM UR CFM-MCNC: NOT DETECTED NG/MG CREAT
OPIATES UR QL SCN: NEGATIVE NG/ML
OXAZEPAM CTO UR CFM-MCNC: NOT DETECTED NG/MG CREAT
OXYCODONE+OXYMORPHONE UR QL SCN: NEGATIVE NG/ML
PCP UR QL SCN: NEGATIVE NG/ML
PROPOXYPH UR QL SCN: NEGATIVE NG/ML
SPECIMEN PH ACCEPTABLE UR: 6.1 (ref 4.5–8.9)
TAPENTADOL UR QL SCN: NEGATIVE NG/ML
TEMAZEPAM UR CFM-MCNC: NOT DETECTED NG/MG CREAT
TRAMADOL UR QL SCN: NEGATIVE NG/ML

## 2024-12-26 NOTE — ASSESSMENT & PLAN NOTE
Creatinine up until October was from 0.8-1.1  Increasing creatinine could be medication induced and intravascular volume depletion vs cardiorenal syndrome  Suspect cardio-renal syndrome  FeNa is consistent with prerenal/HF  Creatinine today is 1.32  Obtain BMP as outpatient.  Assessment

## 2024-12-26 NOTE — DISCHARGE SUMMARY
Discharge Summary - Hospitalist   Name: Martell Mejía 49 y.o. male I MRN: [id number]  Unit/Bed#: [facility] 421-01 I Date of Admission: 12/18/2024   Date of Service: 12/25/2024 I Hospital Day: 7     Assessment & Plan  Acute on Chronic systolic heart failure (HCC)  Wt Readings from Last 3 Encounters:   12/25/24 91.7 kg (202 lb 3.2 oz)   09/24/24 99.9 kg (220 lb 3.2 oz)   02/27/24 103 kg (227 lb 3.2 oz)     In October patient presented to the ED with shortness of breath, found to have new onset heart failure  Echo showed ejection fraction of 20% and he has been following with [facility] since then.   Stress test showed large size, severe intensity infarct in the RCA/LCx territory   Did not have cardiac catheterization at that time  Patient presents with shortness of breath on arrival (12/18/24)   Chest x-ray showed no vascular congestion but CT chest done and reviewed.   CT showed moderate pulm effusion in keeping with CHF and a RML pulmonary 3X3 cm mass--> patient aware that 3 month follow up is required   BNP was elevated at 1481, has AL and mild transaminitis.    Troponin x 2 negative, EKG without acute ischemic changes, has PVCs  In the past month medications were changed due to side effects, new medication introduced for GDMT  12/5/24 Lasix was decreased from 40 mg to 20 mg by cardiology but patient continued to take 40 mg daily, he was afraid he will develop lower extremity swelling  He was on Entresto until last week, due to cough it was switched to valsartan 40 mg daily-restarted back on the Entresto  He is on spironolactone 12.5 mg daily, Jardiance 10 mg daily, metoprolol succinate 25 mg daily  Received a LifeVest on Friday  Cardiology cleared for discharge with 40 mg of Lasix daily with 20 mg additional if there is lower extremity edema or weight gain.  Metoprolol was changed to 25 mg twice daily.  Spironolactone currently on hold due to soft blood pressure.  Restarted back on Entresto.  Patient follow-up with  cardiology.  BMP in a week    Dyspnea on exertion  Seems to be ongoing since October  Most likely related to ischemic cardiomyopathy  Send medically improving  Acute kidney injury (HCC)  Creatinine up until October was from 0.8-1.1  Increasing creatinine could be medication induced and intravascular volume depletion vs cardiorenal syndrome  Suspect cardio-renal syndrome  FeNa is consistent with prerenal/HF  Creatinine today is 1.32  Obtain BMP as outpatient.  Assessment    Hyponatremia  Likely due to CHF  Monitor with diuresis  Proving with diuresis  Transaminitis  Mild transaminitis, due to CHF  Improving with diuresis  Anxiety  Continue Xanax 1 mg 3 times daily as needed  PDMP reviewed  Benign essential hypertension  Continue metoprolol succinate 25 mg twice daily  Started back on Entresto at the time of discharge  Hypercholesterolemia  On Crestor 5 mg daily, substitute for pravastatin 40 mg daily  Gastroesophageal reflux disease with esophagitis  Continue pantoprazole 40 mg daily     Medical Problems       Resolved Problems  Date Reviewed: 12/19/2024   None       Discharging Physician / Practitioner: Charlette Snowden MD  PCP: Jesus Grajeda DO  Admission Date:   Admission Orders (From admission, onward)       Ordered        12/18/24 1713  INPATIENT ADMISSION  Once                          Discharge Date: 12/25/24    Consultations During Hospital Stay:  Cardiology    Procedures Performed:   Echocardiogram-left ventricular cavity size is dilated.  Left ventricular ejection fraction is 15%.  There is severe global hypokinesis.    Significant Findings / Test Results:   1 chest CT-no PE.  Mild cardiomegaly with scattered groundglass opacities and moderate-sized bilateral pleural effusions in keeping up with CHF.  Masslike consolidation in the medial aspect of the right middle lobe measuring 3.3 x 3.9 cm.  This may represent atelectasis or pneumonia.  Developing mass is not excluded and therefore a follow-up CT is  recommended in 3 months.    Incidental Findings:      3.3 x 3.9 cm masslike consolidation in the medial aspect of the right middle lobe.  This may represent atelectasis or pneumonia.  Repeat CT chest in 3 months as outpatient through PCP    Test Results Pending at Discharge (will require follow up):        Outpatient Tests Requested:  BMP in a week    Complications:   none    Reason for Admission: Shortness of breath    Hospital Course:   Martell Mejía is a 49 y.o. male patient who originally presented to the hospital on 12/18/2024 due to shortness of breath.  Patient with known history of hypertension hyperlipidemia cardiomyopathy with systolic heart failure.  Patient follows up with [facility] cardiology.  Patient was noted to be in acute CHF exacerbation.  Patient was admitted to the hospital and diuresed per heart failure service.was diuresed with IV Lasix.  Patient was diuresed with IV Lasix.  At the time of discharge patient will be discharged with 40 mg of Lasix with 20 mg additional as needed for weight gain or shortness of breath or lower extremity edema.  Patient was restarted back on his Entresto.  Metoprolol was increased to 25 mg p.o. twice daily.  Spironolactone was discontinued.  GDMT is limited due to soft blood pressure.  Need close outpatient follow-up with cardiology.  Patient remained hemodynamically stable and afebrile.  Recommended outpatient follow-up with cardiology in a short interval.  Patient had LifeVest delivered to the room.  For details refer to the chart          Please see above list of diagnoses and related plan for additional information.     Condition at Discharge: good    Discharge Day Visit / Exam:   Subjective: Patient seen and examined.  Eager to go home.  Discussed with cardiology.  Cleared for discharge today with close outpatient follow-up  Vitals: Blood Pressure: 104/65 (12/25/24 1340)  Pulse: 83 (12/25/24 1340)  Temperature: 98.3 °F (36.8 °C) (12/25/24 1059)  Temp  "Source: Oral (12/25/24 1100)  Respirations: 18 (12/25/24 1100)  Height: 5' 10\" (177.8 cm) (12/19/24 1620)  Weight - Scale: 91.7 kg (202 lb 3.2 oz) (12/25/24 0554)  SpO2: 98 % (12/25/24 1340)  Physical Exam  Constitutional:       General: He is not in acute distress.     Appearance: He is not ill-appearing.   HENT:      Head: Normocephalic and atraumatic.      Nose: Nose normal.   Eyes:      General: No scleral icterus.  Cardiovascular:      Rate and Rhythm: Normal rate and regular rhythm.      Heart sounds: No murmur heard.  Pulmonary:      Effort: Pulmonary effort is normal. No respiratory distress.   Abdominal:      General: There is no distension.      Tenderness: There is no abdominal tenderness.   Musculoskeletal:      Right lower leg: No edema.      Left lower leg: No edema.   Skin:     General: Skin is warm.   Neurological:      Mental Status: He is alert. Mental status is at baseline.          Discussion with Family:  Updated patient.     Discharge instructions/Information to patient and family:   See after visit summary for information provided to patient and family.      Provisions for Follow-Up Care:  See after visit summary for information related to follow-up care and any pertinent home health orders.      Mobility at time of Discharge:   Basic Mobility Inpatient Raw Score: 24  JH-HLM Goal: 8: Walk 250 feet or more  JH-HLM Achieved: 7: Walk 25 feet or more  HLM Goal achieved. Continue to encourage appropriate mobility.     Disposition:   Home    Planned Readmission:  none    Discharge Medications:  See after visit summary for reconciled discharge medications provided to patient and/or family.      Administrative Statements   Discharge Statement:  I have spent a total time of 35 minutes in caring for this patient on the day of the visit/encounter. >30 minutes of time was spent on: Documenting in the medical record and Reviewing / ordering tests, medicine, procedures  .    **Please Note: This note may " have been constructed using a voice recognition system**

## 2024-12-26 NOTE — ASSESSMENT & PLAN NOTE
Wt Readings from Last 3 Encounters:   12/25/24 91.7 kg (202 lb 3.2 oz)   09/24/24 99.9 kg (220 lb 3.2 oz)   02/27/24 103 kg (227 lb 3.2 oz)     In October patient presented to the ED with shortness of breath, found to have new onset heart failure  Echo showed ejection fraction of 20% and he has been following with [facility] since then.   Stress test showed large size, severe intensity infarct in the RCA/LCx territory   Did not have cardiac catheterization at that time  Patient presents with shortness of breath on arrival (12/18/24)   Chest x-ray showed no vascular congestion but CT chest done and reviewed.   CT showed moderate pulm effusion in keeping with CHF and a RML pulmonary 3X3 cm mass--> patient aware that 3 month follow up is required   BNP was elevated at 1481, has AL and mild transaminitis.    Troponin x 2 negative, EKG without acute ischemic changes, has PVCs  In the past month medications were changed due to side effects, new medication introduced for GDMT  12/5/24 Lasix was decreased from 40 mg to 20 mg by cardiology but patient continued to take 40 mg daily, he was afraid he will develop lower extremity swelling  He was on Entresto until last week, due to cough it was switched to valsartan 40 mg daily-restarted back on the Entresto  He is on spironolactone 12.5 mg daily, Jardiance 10 mg daily, metoprolol succinate 25 mg daily  Received a LifeVest on Friday  Cardiology cleared for discharge with 40 mg of Lasix daily with 20 mg additional if there is lower extremity edema or weight gain.  Metoprolol was changed to 25 mg twice daily.  Spironolactone currently on hold due to soft blood pressure.  Restarted back on Entresto.  Patient follow-up with cardiology.  BMP in a week

## 2024-12-26 NOTE — ASSESSMENT & PLAN NOTE
Continue metoprolol succinate 25 mg twice daily  Started back on Entresto at the time of discharge

## 2024-12-26 NOTE — ASSESSMENT & PLAN NOTE
Seems to be ongoing since October  Most likely related to ischemic cardiomyopathy  Send medically improving

## 2024-12-26 NOTE — UTILIZATION REVIEW
NOTIFICATION OF ADMISSION DISCHARGE   This is a Notification of Discharge from [facility]. Please be advised that this patient has been discharge from our facility. Below you will find the admission and discharge date and time including the patient’s disposition.   UTILIZATION REVIEW CONTACT:  Howie Segovia  Utilization   Network Utilization Review Department  Phone: [phone] x carefully listen to the prompts. All voicemails are confidential.  Email: [email or web address]     ADMISSION INFORMATION  PRESENTATION DATE: 12/18/2024 12:40 PM  OBERVATION ADMISSION DATE: N/A  INPATIENT ADMISSION DATE: 12/18/24  5:13 PM   DISCHARGE DATE: 12/25/2024  4:09 PM   DISPOSITION:Home/Self Care    Network Utilization Review Department  ATTENTION: Please call with any questions or concerns to [phone] and carefully listen to the prompts so that you are directed to the right person. All voicemails are confidential.   For Discharge needs, contact Care Management DC Support Team at [phone] opt. 2  Send all requests for admission clinical reviews, approved or denied determinations and any other requests to dedicated fax number below belonging to the campus where the patient is receiving treatment. List of dedicated fax numbers for the Facilities:  FACILITY NAME UR FAX NUMBER   ADMISSION DENIALS (Administrative/Medical Necessity) [phone]   DISCHARGE SUPPORT TEAM ([facility]) [phone]   PARENT CHILD HEALTH (Maternity/NICU/Pediatrics) [phone]   [facility] [phone]   [facility] [phone]   [facility] [phone]   [facility] [phone]   [facility] [phone]   [facility] [phone]   [facility] [phone]   [facility] [phone]    [facility] [phone]   [facility] [phone]   General acute hospital [phone]   [facility] [phone]

## 2024-12-30 ENCOUNTER — NURSE TRIAGE (OUTPATIENT)
Age: 49
End: 2024-12-30

## 2024-12-30 ENCOUNTER — APPOINTMENT (OUTPATIENT)
Dept: LAB | Facility: CLINIC | Age: 49
End: 2024-12-30
Payer: COMMERCIAL

## 2024-12-30 DIAGNOSIS — I50.9 CHF (CONGESTIVE HEART FAILURE) (HCC): ICD-10-CM

## 2024-12-30 LAB
ANION GAP SERPL CALCULATED.3IONS-SCNC: 9 MMOL/L (ref 4–13)
BUN SERPL-MCNC: 23 MG/DL (ref 5–25)
CALCIUM SERPL-MCNC: 9.7 MG/DL (ref 8.4–10.2)
CHLORIDE SERPL-SCNC: 97 MMOL/L (ref 96–108)
CO2 SERPL-SCNC: 30 MMOL/L (ref 21–32)
CREAT SERPL-MCNC: 1.29 MG/DL (ref 0.6–1.3)
GFR SERPL CREATININE-BSD FRML MDRD: 64 ML/MIN/1.73SQ M
GLUCOSE P FAST SERPL-MCNC: 119 MG/DL (ref 65–99)
POTASSIUM SERPL-SCNC: 4.3 MMOL/L (ref 3.5–5.3)
SODIUM SERPL-SCNC: 136 MMOL/L (ref 135–147)

## 2024-12-30 PROCEDURE — 80048 BASIC METABOLIC PNL TOTAL CA: CPT

## 2024-12-30 PROCEDURE — 36415 COLL VENOUS BLD VENIPUNCTURE: CPT

## 2024-12-30 NOTE — TELEPHONE ENCOUNTER
Dr Edwards is requesting patient be scheduled for an urgent office visit tomorrow with him Tuesday, 12/31, @ [place] office.     Patient is requesting to have an OV with Dr Edwards tomorrow & will drive to [place] office for it. Per Dr Edwrads, will someone please schedule patient for an urgent visit with Dr Edwards tomorrow?    Thank you.    Returned call to patient who is experiencing CHF symptoms with a cough while on the phone.   Advised patient to go to ED if symptoms persistent or worsen.    Patient verbalized understanding.

## 2024-12-30 NOTE — TELEPHONE ENCOUNTER
Pt called back stating that he is starting to develop a cough as well and wondering if that might be related to retaining fluid.     Please advise

## 2024-12-30 NOTE — TELEPHONE ENCOUNTER
Patient is scheduled for an OV with you on 1/29. Was d/c from [facility] 12/25. Former patient of [facility].    As described in previous encounter notes, for the past 2 days, patient has experienced a weight gain, he is not urinating as much as his normal UA output, his ankles & feet are edematous. Feels he is retaining fluid.     States his Furosemide is not working. Took Furosemide 80 mg yesterday & has not seen a a difference in urine output or edema.    Please advise.

## 2024-12-30 NOTE — TELEPHONE ENCOUNTER
"Reason for Disposition   Weight GAIN > 5 lbs (2 kg) in one day (24 hours)    Answer Assessment - Initial Assessment Questions  1. MAIN CONCERN OR SYMPTOM: \"What is your main concern right now?\" \"What's the main symptom you're worried about?\" (e.g., breathing difficulty, ankle swelling, weight gain).      Slightly lower legs, ankles and feet/puffy     Gain weight over the last few days, and not urination as frequently  Feels he retaining fluid.   2. ONSET: \"When did the   start?\"      2 days ago  3. BREATHING DIFFICULTY: \"Are you having any difficulty breathing?\" If Yes, ask: \"How bad is it?\"  (e.g., none, mild, moderate, severe)  \"Is this worse than usual for you?\"      Denies   4. EDEMA - FOOT-LEG SWELLING: \"Do you have swelling of your ankles, feet or legs?\" If Yes, ask: \"How bad is the swelling?\" (e.g., localized; mild, moderate, severe)      Ankles and feet/puffy     Denies any abdomin bloating     5. WEIGHT - CURRENT: \"What is your weight today?\"      Weight   6. WEIGHT - TARGET RANGE: \"Do you try to keep your weight in a target (goal) range?\" If Yes, ask: \"What is that range?\"      202 LBS  discharge at the hospital, stated he is up 95 kg/209   7. WEIGHT - CHANGE: \"Have you gained (or lost) weight in the past 24 hours? Past week (7 days)?\" If Yes, ask:  \"How much weight?\"        8. OTHER SYMPTOMS: \"Do you have any other symptoms?\" (e.g., depression, weakness or fatigue, abdomen bloating, hacky cough)      Denies   9. DIURETICS: \"Are you currently taking water pills?\" (e.g., furosemide [Lasix], hydrochlorothiazide [HCTZ], bumetanide [Bumex], metolazone [Zaroxolyn]) If Yes, ask: \"What medicine are you taking, and how often?\"  \"Any recent change in dose?\"       Furosemide not working, feels like not urinating frequently  Yesterday too furosemide 80 mg, not noticing much urination,   Urinating but not much.   10. O2 SATURATION MONITOR: \"Do you use an oxygen saturation monitor (pulse oximeter) at home?\" If Yes, " "ask: \"What is your reading (oxygen level) today?\" \"What is your usual oxygen saturation reading?\" (e.g., 95%)        Unsure   11. HEART FAILURE HCP: \"Who treats your heart failure?\"  (e.g., cardiologist or heart specialist, heart failure clinic or center, primary care doctor)        Cardiology   12. FLUID and SODIUM RESTRICTIONS: \"Have you been instructed to restrict your daily fluid intake or sodium (salt) intake?\" If Yes, ask: \"What are your daily limits?\"        2 liter   Sodium intake  Pt currently wearing a lifevest   Bw today, no BP/HR noted does not check  Pt states took furosemide 80 mg yesterday and no change.     Please advise    Protocols used: Heart Failure on Treatment Follow-up Call-Adult-[place]    "

## 2024-12-31 ENCOUNTER — OFFICE VISIT (OUTPATIENT)
Dept: CARDIOLOGY CLINIC | Facility: CLINIC | Age: 49
End: 2024-12-31
Payer: COMMERCIAL

## 2024-12-31 ENCOUNTER — TELEPHONE (OUTPATIENT)
Age: 49
End: 2024-12-31

## 2024-12-31 VITALS
HEART RATE: 97 BPM | BODY MASS INDEX: 30.67 KG/M2 | HEIGHT: 70 IN | WEIGHT: 214.2 LBS | OXYGEN SATURATION: 99 % | DIASTOLIC BLOOD PRESSURE: 64 MMHG | SYSTOLIC BLOOD PRESSURE: 100 MMHG

## 2024-12-31 DIAGNOSIS — I10 BENIGN ESSENTIAL HYPERTENSION: ICD-10-CM

## 2024-12-31 DIAGNOSIS — I50.22 CHRONIC SYSTOLIC HEART FAILURE (HCC): Primary | ICD-10-CM

## 2024-12-31 DIAGNOSIS — Q21.0 VSD (VENTRICULAR SEPTAL DEFECT): ICD-10-CM

## 2024-12-31 DIAGNOSIS — F12.90 MARIJUANA USE: ICD-10-CM

## 2024-12-31 PROCEDURE — 99214 OFFICE O/P EST MOD 30 MIN: CPT | Performed by: STUDENT IN AN ORGANIZED HEALTH CARE EDUCATION/TRAINING PROGRAM

## 2024-12-31 PROCEDURE — 96374 THER/PROPH/DIAG INJ IV PUSH: CPT

## 2024-12-31 RX ORDER — TORSEMIDE 20 MG/1
40 TABLET ORAL 2 TIMES DAILY
Qty: 120 TABLET | Refills: 17 | Status: SHIPPED | OUTPATIENT
Start: 2024-12-31 | End: 2025-01-09

## 2024-12-31 RX ORDER — FUROSEMIDE 10 MG/ML
100 INJECTION INTRAMUSCULAR; INTRAVENOUS ONCE
Status: COMPLETED | OUTPATIENT
Start: 2024-12-31 | End: 2024-12-31

## 2024-12-31 RX ORDER — POTASSIUM CHLORIDE 1500 MG/1
20 TABLET, EXTENDED RELEASE ORAL DAILY
Qty: 30 TABLET | Refills: 17 | Status: SHIPPED | OUTPATIENT
Start: 2024-12-31 | End: 2025-01-09 | Stop reason: SDUPTHER

## 2024-12-31 RX ADMIN — FUROSEMIDE 100 MG: 10 INJECTION INTRAMUSCULAR; INTRAVENOUS at 13:16

## 2024-12-31 NOTE — PROGRESS NOTES
PT given 100mg Lasix in office. PT tolerated well.   One attempt on IV insertion.     PT voided 500mL    LOT [id number]  Ex06/2026    Lot [id number]  Ex 01/2025    Lot [id number]  06/2026

## 2024-12-31 NOTE — TELEPHONE ENCOUNTER
Patient cancelled his TCM appt for today.  I left him a message to call back to reschedule before 1/8/25.

## 2024-12-31 NOTE — PROGRESS NOTES
"Advanced Heart Failure/Pulmonary Hypertension Outpatient Note - Martell Mejía 49 y.o. male MRN: [id number]    @ Encounter: [id number]    Assessment:  49 y.o. male PMH and acute problems listed later in this note (a partial list may also be included within 'assessment' section) presents for follow up.  I first met Martell Mejía 12/2024    NICM, HFrEF, 15%, biv failure, nondilated LV  new Dx 10/2024  Inferior infarct by NST  PVC's  VSD repaired 9 months, followed [facility] x 16 years as child  No toxic habits except +THC  +FH cardiac dz young age      Today I have reviewed all pertinent labs/imaging/data including but not limited to:        Lab Units 12/30/24  1008 12/25/24  0519 12/24/24  0504   CREATININE mg/dL 1.29 1.32* 1.35*     Results from last 7 days   Lab Units 12/30/24  1008 12/25/24  0519   CREATININE mg/dL 1.29 1.32*     Lab Results   Component Value Date    K 4.3 12/30/2024     Lab Results   Component Value Date    HGBA1C 6.5 (H) 12/20/2024     Lab Results   Component Value Date    TSH 2.21 11/12/2024     Lab Results   Component Value Date    LDLCALC 107 (H) 10/30/2014     Lab Results   Component Value Date    BNP 1,481 (H) 12/18/2024      No results found for: \"NTBNP\"       TODAY'S PLAN:     12/31/24  Warm, vol up  More sob, +cough, +LE edema in recent days; feels he is not peeing as much on his current lasix 80AM and 40PM  Soft BP    IV lasix 100 today in clinic    DC lasix and switch to torsemide 40 bid  Add kdur    Check CMP this week    MRI done inpt, results pending    stop THC     Gdmt advancement future as able  Possibly Mra next pending course  Tolerating arni resumption well (resumed as inpt recently)     Genetics ordered today     He has lifevest from [facility]    Nees to stop thc    CXR for cough and recent pill swallowing/regurgitation event at home    Strict RTC precautions given    Follow up:  With me in 2 weeks or sooner if symptoms evolve.  In addition to follow up with their other medical " providers      Neurohormonal Blockade:  --Beta-Blocker: Toprol XL 25 mg BID  --ACEi, ARB or ARNi: Entresto 24/26 mg BID  --Aldosterone Receptor Blocker: Aldactone future  --SGLT2i: Jardiance 10 mg QD    --Diuretic:   Home diuretic: plan above     Sudden Cardiac Death Risk Reduction:  --ICD: LVEF <20% Lifevest from [facility]  Electrical Resynchronization:  --Candidacy for BiV device: Will continue to monitor  Advanced Therapies: Will continue to monitor.   --Inotrope:   --LVAD/Transplant Candidacy: THC+    Studies:  Today I have reviewed all pertinent patient data/labs/imaging where available, including but not limited to the below studies. This includes my independent interpretation. Selected results may be displayed here but comprehensive listing is omitted for note clarity and can be found in the epic chart.    ECG.    Echo.    Stress.    Cath.    HPI:   49 y.o. male PMH and acute problems listed later in this note (a partial list may also be included within 'assessment' section) presents for follow up.  No new CP/dizziness/palpitations.  +fatigue, sob, edema, cough.  Taking all Rx  Making Less urine than he expects    ROS:  10 point ROS negative except as specified in HPI/interval history    Past Medical History:   Diagnosis Date    Allergic     Anxiety     Depression     GERD (gastroesophageal reflux disease)     Hyperlipidemia     Hypertension      Patient Active Problem List   Diagnosis    Anxiety    Benign essential hypertension    Acute gastritis    Hypercholesterolemia    Irritable bowel syndrome with diarrhea    Gastroesophageal reflux disease with esophagitis    Tick bite    Allergic contact dermatitis    COVID-19    Attention deficit hyperactivity disorder (ADHD), combined type    Acute on Chronic systolic heart failure (HCC)    Acute kidney injury (HCC)    Hyponatremia    Transaminitis    Dyspnea on exertion          Current Facility-Administered Medications   Medication Dose Route Frequency Provider Last  Rate    furosemide  100 mg Intravenous Once        No Known Allergies  Social History     Socioeconomic History    Marital status: /Civil Union     Spouse name: Not on file    Number of children: Not on file    Years of education: Not on file    Highest education level: Not on file   Occupational History    Occupation: employed   Tobacco Use    Smoking status: Former     Current packs/day: 0.00     Types: Cigarettes     Quit date: 2011     Years since quittin.5    Smokeless tobacco: Never   Vaping Use    Vaping status: Never Used   Substance and Sexual Activity    Alcohol use: Yes     Comment: seldom    Drug use: Never    Sexual activity: Yes     Partners: Female   Other Topics Concern    Not on file   Social History Narrative    Not on file     Social Drivers of Health     Financial Resource Strain: Not on file   Food Insecurity: No Food Insecurity (2024)    Nursing - Inadequate Food Risk Classification     Worried About Running Out of Food in the Last Year: Not on file     Ran Out of Food in the Last Year: Not on file     Ran Out of Food in the Last Year: 1   Transportation Needs: No Transportation Needs (2024)    Nursing - Transportation Risk Classification     Lack of Transportation: Not on file     Lack of Transportation: 2   Physical Activity: Not on file   Stress: Not on file   Social Connections: Not on file   Intimate Partner Violence: Unknown (2024)    Nursing IPS     Feels Physically and Emotionally Safe: Not on file     Physically Hurt by Someone: Not on file     Humiliated or Emotionally Abused by Someone: Not on file     Physically Hurt by Someone: 2     Hurt or Threatened by Someone: 2   Housing Stability: Unknown (2024)    Nursing: Inadequate Housing Risk Classification     Has Housing: Not on file     Worried About Losing Housing: Not on file     Unable to Get Utilities: Not on file     Unable to Pay for Housing in the Last Year: 2     Has Housin  "    Family History   Problem Relation Age of Onset    No Known Problems Mother     Diabetes Father        Physical Exam:  Vitals:    12/31/24 1127   BP: 100/60   BP Location: Right arm   Patient Position: Sitting   Cuff Size: Standard   Pulse: 97   SpO2: 99%   Weight: 97.2 kg (214 lb 3.2 oz)   Height: 5' 10\" (1.778 m)     Constitutional: NAD, non toxic, speaks easily  Ears/nose/mouth/throat: atraumatic  CV: RRR, +JVD  Resp: CTABL  GI: Soft, NTND  MSK: no swollen joints in exposed areas  Extr: +2 LE edema, warm LE  Pysche: Normal affect  Neuro: appropriate in conversation  Skin: dry and intact in exposed areas    Labs & Results:  Lab Results   Component Value Date    SODIUM 136 12/30/2024    K 4.3 12/30/2024    CL 97 12/30/2024    CO2 30 12/30/2024    BUN 23 12/30/2024    CREATININE 1.29 12/30/2024    GLUC 98 12/25/2024    CALCIUM 9.7 12/30/2024     Lab Results   Component Value Date    WBC 8.87 12/20/2024    HGB 14.2 12/20/2024    HCT 43.9 12/20/2024    MCV 94 12/20/2024     12/20/2024       Counseling / Coordination of Care  Greater than 50% of total time was spent with the patient and / or family counseling and / or coordination of care.  Discussion included diagnoses, most recent studies, tests and any changes in treatment plan.    Thank you for the opportunity to participate in the care of this patient.    Jian Edwards MD  Attending Physician  Advanced Heart Failure and Transplant Cardiology  [facility]  "

## 2024-12-31 NOTE — TELEPHONE ENCOUNTER
F/U call to patient this morning. Patient stated his symptoms are the same as yesterday, except his cough is worse.   Ankles & feet are edematous with some edema to lower legs.    Reviewed with patient his scheduled OV with Dr Edwards @ [place] this AM & gave him office address.    Patient verbalized understanding.

## 2025-01-01 ENCOUNTER — HOSPITAL ENCOUNTER (OUTPATIENT)
Dept: RADIOLOGY | Facility: HOSPITAL | Age: 50
Discharge: HOME | End: 2025-01-01
Payer: COMMERCIAL

## 2025-01-01 ENCOUNTER — APPOINTMENT (OUTPATIENT)
Dept: RADIOLOGY | Facility: HOSPITAL | Age: 50
End: 2025-01-01
Payer: COMMERCIAL

## 2025-01-01 DIAGNOSIS — I50.22 CHRONIC SYSTOLIC HEART FAILURE (HCC): ICD-10-CM

## 2025-01-01 PROCEDURE — 71046 X-RAY EXAM CHEST 2 VIEWS: CPT

## 2025-01-02 DIAGNOSIS — Z00.6 ENCOUNTER FOR EXAMINATION FOR NORMAL COMPARISON OR CONTROL IN CLINICAL RESEARCH PROGRAM: ICD-10-CM

## 2025-01-03 ENCOUNTER — APPOINTMENT (OUTPATIENT)
Dept: LAB | Facility: CLINIC | Age: 50
End: 2025-01-03
Payer: COMMERCIAL

## 2025-01-03 DIAGNOSIS — Z00.6 ENCOUNTER FOR EXAMINATION FOR NORMAL COMPARISON OR CONTROL IN CLINICAL RESEARCH PROGRAM: ICD-10-CM

## 2025-01-03 DIAGNOSIS — I50.22 CHRONIC SYSTOLIC HEART FAILURE (HCC): ICD-10-CM

## 2025-01-03 PROCEDURE — 80053 COMPREHEN METABOLIC PANEL: CPT

## 2025-01-03 PROCEDURE — 36415 COLL VENOUS BLD VENIPUNCTURE: CPT

## 2025-01-04 ENCOUNTER — HOSPITAL ENCOUNTER (INPATIENT)
Facility: HOSPITAL | Age: 50
LOS: 1 days | Discharge: LEFT BEFORE DISCHARGE | DRG: 194 | End: 2025-01-07
Attending: EMERGENCY MEDICINE | Admitting: FAMILY MEDICINE
Payer: COMMERCIAL

## 2025-01-04 ENCOUNTER — APPOINTMENT (EMERGENCY)
Dept: RADIOLOGY | Facility: HOSPITAL | Age: 50
DRG: 194 | End: 2025-01-04
Payer: COMMERCIAL

## 2025-01-04 DIAGNOSIS — R06.09 DYSPNEA ON EXERTION: ICD-10-CM

## 2025-01-04 DIAGNOSIS — I50.42 CHRONIC COMBINED SYSTOLIC AND DIASTOLIC HEART FAILURE (HCC): ICD-10-CM

## 2025-01-04 DIAGNOSIS — R55 NEAR SYNCOPE: Primary | ICD-10-CM

## 2025-01-04 DIAGNOSIS — E87.1 HYPONATREMIA: ICD-10-CM

## 2025-01-04 DIAGNOSIS — J10.1 INFLUENZA A: ICD-10-CM

## 2025-01-04 DIAGNOSIS — I50.22 CHRONIC SYSTOLIC HEART FAILURE (HCC): ICD-10-CM

## 2025-01-04 PROBLEM — F12.10 TETRAHYDROCANNABINOL (THC) USE DISORDER, MILD, ABUSE: Status: ACTIVE | Noted: 2025-01-04

## 2025-01-04 LAB
2HR DELTA HS TROPONIN: 1 NG/L
4HR DELTA HS TROPONIN: 6 NG/L
ALBUMIN SERPL BCG-MCNC: 4.1 G/DL (ref 3.5–5)
ALBUMIN SERPL BCG-MCNC: 4.2 G/DL (ref 3.5–5)
ALP SERPL-CCNC: 86 U/L (ref 34–104)
ALP SERPL-CCNC: 87 U/L (ref 34–104)
ALT SERPL W P-5'-P-CCNC: 28 U/L (ref 7–52)
ALT SERPL W P-5'-P-CCNC: 35 U/L (ref 7–52)
ANION GAP SERPL CALCULATED.3IONS-SCNC: 11 MMOL/L (ref 4–13)
ANION GAP SERPL CALCULATED.3IONS-SCNC: 12 MMOL/L (ref 4–13)
AST SERPL W P-5'-P-CCNC: 15 U/L (ref 13–39)
AST SERPL W P-5'-P-CCNC: 21 U/L (ref 13–39)
BASOPHILS # BLD AUTO: 0.04 THOUSANDS/ΜL (ref 0–0.1)
BASOPHILS NFR BLD AUTO: 0 % (ref 0–1)
BILIRUB SERPL-MCNC: 1.14 MG/DL (ref 0.2–1)
BILIRUB SERPL-MCNC: 1.17 MG/DL (ref 0.2–1)
BUN SERPL-MCNC: 20 MG/DL (ref 5–25)
BUN SERPL-MCNC: 23 MG/DL (ref 5–25)
CALCIUM SERPL-MCNC: 10 MG/DL (ref 8.4–10.2)
CALCIUM SERPL-MCNC: 9.5 MG/DL (ref 8.4–10.2)
CARDIAC TROPONIN I PNL SERPL HS: 31 NG/L (ref ?–50)
CARDIAC TROPONIN I PNL SERPL HS: 32 NG/L (ref ?–50)
CARDIAC TROPONIN I PNL SERPL HS: 37 NG/L (ref ?–50)
CHLORIDE SERPL-SCNC: 93 MMOL/L (ref 96–108)
CHLORIDE SERPL-SCNC: 96 MMOL/L (ref 96–108)
CO2 SERPL-SCNC: 29 MMOL/L (ref 21–32)
CO2 SERPL-SCNC: 30 MMOL/L (ref 21–32)
CREAT SERPL-MCNC: 1.27 MG/DL (ref 0.6–1.3)
CREAT SERPL-MCNC: 1.29 MG/DL (ref 0.6–1.3)
EOSINOPHIL # BLD AUTO: 0.03 THOUSAND/ΜL (ref 0–0.61)
EOSINOPHIL NFR BLD AUTO: 0 % (ref 0–6)
ERYTHROCYTE [DISTWIDTH] IN BLOOD BY AUTOMATED COUNT: 14.3 % (ref 11.6–15.1)
GFR SERPL CREATININE-BSD FRML MDRD: 64 ML/MIN/1.73SQ M
GFR SERPL CREATININE-BSD FRML MDRD: 65 ML/MIN/1.73SQ M
GLUCOSE P FAST SERPL-MCNC: 101 MG/DL (ref 65–99)
GLUCOSE SERPL-MCNC: 77 MG/DL (ref 65–140)
HCT VFR BLD AUTO: 46.6 % (ref 36.5–49.3)
HGB BLD-MCNC: 14.9 G/DL (ref 12–17)
IMM GRANULOCYTES # BLD AUTO: 0.03 THOUSAND/UL (ref 0–0.2)
IMM GRANULOCYTES NFR BLD AUTO: 0 % (ref 0–2)
LYMPHOCYTES # BLD AUTO: 0.74 THOUSANDS/ΜL (ref 0.6–4.47)
LYMPHOCYTES NFR BLD AUTO: 8 % (ref 14–44)
MCH RBC QN AUTO: 30.6 PG (ref 26.8–34.3)
MCHC RBC AUTO-ENTMCNC: 32 G/DL (ref 31.4–37.4)
MCV RBC AUTO: 96 FL (ref 82–98)
MONOCYTES # BLD AUTO: 0.44 THOUSAND/ΜL (ref 0.17–1.22)
MONOCYTES NFR BLD AUTO: 4 % (ref 4–12)
NEUTROPHILS # BLD AUTO: 8.61 THOUSANDS/ΜL (ref 1.85–7.62)
NEUTS SEG NFR BLD AUTO: 88 % (ref 43–75)
NRBC BLD AUTO-RTO: 0 /100 WBCS
PLATELET # BLD AUTO: 244 THOUSANDS/UL (ref 149–390)
PMV BLD AUTO: 10 FL (ref 8.9–12.7)
POTASSIUM SERPL-SCNC: 3.7 MMOL/L (ref 3.5–5.3)
POTASSIUM SERPL-SCNC: 4.4 MMOL/L (ref 3.5–5.3)
PROT SERPL-MCNC: 6.4 G/DL (ref 6.4–8.4)
PROT SERPL-MCNC: 6.8 G/DL (ref 6.4–8.4)
RBC # BLD AUTO: 4.87 MILLION/UL (ref 3.88–5.62)
SODIUM SERPL-SCNC: 133 MMOL/L (ref 135–147)
SODIUM SERPL-SCNC: 138 MMOL/L (ref 135–147)
WBC # BLD AUTO: 9.89 THOUSAND/UL (ref 4.31–10.16)

## 2025-01-04 PROCEDURE — 99285 EMERGENCY DEPT VISIT HI MDM: CPT | Performed by: EMERGENCY MEDICINE

## 2025-01-04 PROCEDURE — 94640 AIRWAY INHALATION TREATMENT: CPT

## 2025-01-04 PROCEDURE — 84484 ASSAY OF TROPONIN QUANT: CPT | Performed by: EMERGENCY MEDICINE

## 2025-01-04 PROCEDURE — 71045 X-RAY EXAM CHEST 1 VIEW: CPT

## 2025-01-04 PROCEDURE — 85025 COMPLETE CBC W/AUTO DIFF WBC: CPT | Performed by: EMERGENCY MEDICINE

## 2025-01-04 PROCEDURE — 93005 ELECTROCARDIOGRAM TRACING: CPT

## 2025-01-04 PROCEDURE — 99223 1ST HOSP IP/OBS HIGH 75: CPT | Performed by: INTERNAL MEDICINE

## 2025-01-04 PROCEDURE — 36415 COLL VENOUS BLD VENIPUNCTURE: CPT

## 2025-01-04 PROCEDURE — 80053 COMPREHEN METABOLIC PANEL: CPT | Performed by: EMERGENCY MEDICINE

## 2025-01-04 PROCEDURE — 99285 EMERGENCY DEPT VISIT HI MDM: CPT

## 2025-01-04 RX ORDER — POTASSIUM CHLORIDE 1500 MG/1
40 TABLET, EXTENDED RELEASE ORAL ONCE
Status: COMPLETED | OUTPATIENT
Start: 2025-01-04 | End: 2025-01-04

## 2025-01-04 RX ORDER — SODIUM CHLORIDE, SODIUM GLUCONATE, SODIUM ACETATE, POTASSIUM CHLORIDE, MAGNESIUM CHLORIDE, SODIUM PHOSPHATE, DIBASIC, AND POTASSIUM PHOSPHATE .53; .5; .37; .037; .03; .012; .00082 G/100ML; G/100ML; G/100ML; G/100ML; G/100ML; G/100ML; G/100ML
250 INJECTION, SOLUTION INTRAVENOUS ONCE
Status: COMPLETED | OUTPATIENT
Start: 2025-01-04 | End: 2025-01-04

## 2025-01-04 RX ORDER — ALBUTEROL SULFATE 5 MG/ML
5 SOLUTION RESPIRATORY (INHALATION) ONCE
Status: COMPLETED | OUTPATIENT
Start: 2025-01-04 | End: 2025-01-04

## 2025-01-04 RX ORDER — METOPROLOL SUCCINATE 25 MG/1
25 TABLET, EXTENDED RELEASE ORAL 2 TIMES DAILY
Status: DISCONTINUED | OUTPATIENT
Start: 2025-01-05 | End: 2025-01-04

## 2025-01-04 RX ORDER — PANTOPRAZOLE SODIUM 40 MG/1
40 TABLET, DELAYED RELEASE ORAL
Status: DISCONTINUED | OUTPATIENT
Start: 2025-01-05 | End: 2025-01-07 | Stop reason: HOSPADM

## 2025-01-04 RX ORDER — ALPRAZOLAM 0.5 MG
0.5 TABLET ORAL 2 TIMES DAILY PRN
Status: DISCONTINUED | OUTPATIENT
Start: 2025-01-05 | End: 2025-01-04

## 2025-01-04 RX ORDER — HEPARIN SODIUM 5000 [USP'U]/ML
5000 INJECTION, SOLUTION INTRAVENOUS; SUBCUTANEOUS EVERY 8 HOURS SCHEDULED
Status: DISCONTINUED | OUTPATIENT
Start: 2025-01-05 | End: 2025-01-07 | Stop reason: HOSPADM

## 2025-01-04 RX ORDER — PRAVASTATIN SODIUM 40 MG
40 TABLET ORAL
Status: DISCONTINUED | OUTPATIENT
Start: 2025-01-05 | End: 2025-01-07 | Stop reason: HOSPADM

## 2025-01-04 RX ORDER — ALPRAZOLAM 0.5 MG
0.5 TABLET ORAL 3 TIMES DAILY PRN
Status: DISCONTINUED | OUTPATIENT
Start: 2025-01-05 | End: 2025-01-05

## 2025-01-04 RX ORDER — ACETAMINOPHEN 325 MG/1
650 TABLET ORAL EVERY 6 HOURS PRN
Status: DISCONTINUED | OUTPATIENT
Start: 2025-01-04 | End: 2025-01-07 | Stop reason: HOSPADM

## 2025-01-04 RX ORDER — MAGNESIUM SULFATE 1 G/100ML
1 INJECTION INTRAVENOUS ONCE
Status: COMPLETED | OUTPATIENT
Start: 2025-01-04 | End: 2025-01-04

## 2025-01-04 RX ORDER — METOPROLOL SUCCINATE 25 MG/1
25 TABLET, EXTENDED RELEASE ORAL 2 TIMES DAILY
Status: DISCONTINUED | OUTPATIENT
Start: 2025-01-04 | End: 2025-01-05

## 2025-01-04 RX ORDER — LORAZEPAM 0.5 MG/1
0.5 TABLET ORAL ONCE
Status: COMPLETED | OUTPATIENT
Start: 2025-01-04 | End: 2025-01-04

## 2025-01-04 RX ORDER — ASPIRIN 81 MG/1
81 TABLET, CHEWABLE ORAL DAILY
Status: DISCONTINUED | OUTPATIENT
Start: 2025-01-05 | End: 2025-01-07 | Stop reason: HOSPADM

## 2025-01-04 RX ORDER — ALPRAZOLAM 0.5 MG
0.5 TABLET ORAL ONCE
Status: COMPLETED | OUTPATIENT
Start: 2025-01-04 | End: 2025-01-04

## 2025-01-04 RX ORDER — POTASSIUM CHLORIDE 1500 MG/1
20 TABLET, EXTENDED RELEASE ORAL DAILY
Status: DISCONTINUED | OUTPATIENT
Start: 2025-01-05 | End: 2025-01-07 | Stop reason: HOSPADM

## 2025-01-04 RX ADMIN — MAGNESIUM SULFATE HEPTAHYDRATE 1 G: 1 INJECTION, SOLUTION INTRAVENOUS at 20:44

## 2025-01-04 RX ADMIN — POTASSIUM CHLORIDE 40 MEQ: 1500 TABLET, EXTENDED RELEASE ORAL at 20:38

## 2025-01-04 RX ADMIN — SODIUM CHLORIDE, SODIUM GLUCONATE, SODIUM ACETATE, POTASSIUM CHLORIDE, MAGNESIUM CHLORIDE, SODIUM PHOSPHATE, DIBASIC, AND POTASSIUM PHOSPHATE 250 ML: .53; .5; .37; .037; .03; .012; .00082 INJECTION, SOLUTION INTRAVENOUS at 20:44

## 2025-01-04 RX ADMIN — METOPROLOL SUCCINATE 25 MG: 25 TABLET, EXTENDED RELEASE ORAL at 20:38

## 2025-01-04 RX ADMIN — ALBUTEROL SULFATE 5 MG: 2.5 SOLUTION RESPIRATORY (INHALATION) at 16:18

## 2025-01-04 RX ADMIN — ALPRAZOLAM 0.5 MG: 0.5 TABLET ORAL at 20:53

## 2025-01-04 RX ADMIN — IPRATROPIUM BROMIDE 0.5 MG: 0.5 SOLUTION RESPIRATORY (INHALATION) at 16:18

## 2025-01-04 RX ADMIN — LORAZEPAM 0.5 MG: 0.5 TABLET ORAL at 20:38

## 2025-01-04 NOTE — Clinical Note
Case was discussed with MARTÍN and the patient's admission status was agreed to be Admission Status: observation status to the service of Dr. Fonseca

## 2025-01-04 NOTE — ED PROVIDER NOTES
Time reflects when diagnosis was documented in both MDM as applicable and the Disposition within this note       Time User Action Codes Description Comment    1/4/2025  4:40 PM Roberto Toribio Add [R55] Near syncope     1/4/2025  9:43 PM Irwin Thomason [I50.42] Chronic combined systolic and diastolic heart failure (HCC)           ED Disposition       ED Disposition   Admit    Condition   Stable    Date/Time   Sat Jan 4, 2025  8:11 PM    Comment   Case was discussed with [facility] and the patient's admission status was agreed to be Admission Status: observation status to the service of Dr. Thomason .               Assessment & Plan       Medical Decision Making  49-year-old male with past medical history of CHF, LifeVest, presents ED for evaluation of lightheadedness with cough beginning yesterday  On review of cardiology note from visit 12/31/2024, patient has a history of HFrEF, EF 15%, biventricular failure, nondilated LV.  Patient wears a LifeVest.  At that visit, BP was 100/60, pulse 97.  EKG read and interpreted by me.  Rate 106 bpm.  Sinus tachycardia.  Right axis deviation.  Prolonged QT interval.  Right bundle branch block.  No STEMI  Differential diagnosis: Rule out CHF, asthma exacerbation, rule out ACS, rule out infectious.  Plan: CBC, CMP, troponin, chest x-ray.  Please see ED course for additional information.  Will likely admit this patient given his comorbidities and recent hospitalization for CHF exacerbation     Amount and/or Complexity of Data Reviewed  Labs: ordered. Decision-making details documented in ED Course.  Radiology: ordered. Decision-making details documented in ED Course.    Risk  Prescription drug management.  Decision regarding hospitalization.        ED Course as of 01/05/25 0141   Sat Jan 04, 2025   1525 hs TnI 0hr: 31   1551 XR chest 1 view portable  No acute cardiopulmonary disease per radiologist read.    2011 hs TnI 2hr: 32  stable   2011 Admitted to [facility] for obs       Medications    aspirin chewable tablet 81 mg (has no administration in time range)   pantoprazole (PROTONIX) EC tablet 40 mg (has no administration in time range)   pravastatin (PRAVACHOL) tablet 40 mg (has no administration in time range)   acetaminophen (TYLENOL) tablet 650 mg (has no administration in time range)   metoprolol succinate (TOPROL-XL) 24 hr tablet 25 mg (25 mg Oral Given 1/4/25 2038)   potassium chloride (Klor-Con M20) CR tablet 20 mEq (has no administration in time range)   ALPRAZolam (XANAX) tablet 0.5 mg (has no administration in time range)   heparin (porcine) subcutaneous injection 5,000 Units (has no administration in time range)   albuterol inhalation solution 5 mg (5 mg Nebulization Given 1/4/25 1618)   ipratropium (ATROVENT) 0.02 % inhalation solution 0.5 mg (0.5 mg Nebulization Given 1/4/25 1618)   LORazepam (ATIVAN) tablet 0.5 mg (0.5 mg Oral Given 1/4/25 2038)   multi-electrolyte (ISOLYTE-S PH 7.4) bolus 250 mL (0 mL Intravenous Stopped 1/4/25 2322)   magnesium sulfate IVPB (premix) SOLN 1 g (0 g Intravenous Stopped 1/4/25 2322)   potassium chloride (Klor-Con M20) CR tablet 40 mEq (40 mEq Oral Given 1/4/25 2038)   ALPRAZolam (XANAX) tablet 0.5 mg (0.5 mg Oral Given 1/4/25 2053)       ED Risk Strat Scores                          SBIRT 20yo+      Flowsheet Row Most Recent Value   Initial Alcohol Screen: US AUDIT-C     1. How often do you have a drink containing alcohol? 0 Filed at: 01/04/2025 1609   2. How many drinks containing alcohol do you have on a typical day you are drinking?  0 Filed at: 01/04/2025 1609   3a. Male UNDER 65: How often do you have five or more drinks on one occasion? 0 Filed at: 01/04/2025 1609   Audit-C Score 0 Filed at: 01/04/2025 1609   GUNNAR: How many times in the past year have you...    Used an illegal drug or used a prescription medication for non-medical reasons? Never Filed at: 01/04/2025 0160                            History of Present Illness       Chief Complaint  "  Patient presents with    Dizziness     Patient reports coughing throughout the night and this morning sitting up to cough and started to see black but did not lose consciousness. Patient presents to ED wearing life vest which he states he has had for the past month. Patient denies SOB and CP.       Past Medical History:   Diagnosis Date    Allergic     Anxiety     Depression     GERD (gastroesophageal reflux disease)     Hyperlipidemia     Hypertension       Past Surgical History:   Procedure Laterality Date    NO PAST SURGERIES        Family History   Problem Relation Age of Onset    No Known Problems Mother     Diabetes Father       Social History     Tobacco Use    Smoking status: Former     Current packs/day: 0.00     Types: Cigarettes     Quit date: 2011     Years since quittin.5    Smokeless tobacco: Never   Vaping Use    Vaping status: Never Used   Substance Use Topics    Alcohol use: Yes     Comment: seldom    Drug use: Not Currently     Types: Marijuana      E-Cigarette/Vaping    E-Cigarette Use Never User       E-Cigarette/Vaping Substances    Nicotine No     THC No     CBD No     Flavoring No     Other No     Unknown No       I have reviewed and agree with the history as documented.     49-year-old male with past medical history hypertension, chronic systolic heart failure, LifeVest, presents ED for evaluation of lightheadedness.  Patient states that since last night, he has had episodes of coughing where he feels lightheaded.  He states that he starts to see \"dark spots \".  He has not syncopized.  He denies associated shortness of breath and chest pain.  He does report that during the coughing fits, he feels like his chest is \"burning \".  He denies measuring a fever, but states that he feels a subjective fever.  Patient was recently evaluated by cardiology on .  He received subsequent chest x-ray which was read as no acute cardiopulmonary disease.  Patient was recently admitted on  " for CHF.  He reports he was discharged with albuterol MDI which she has been using 4 times per day.  He did not use it today.          Review of Systems   Constitutional:  Positive for fever (subjective). Negative for chills.   Respiratory:  Positive for cough and wheezing.    Cardiovascular:  Positive for chest pain.   Gastrointestinal:  Negative for abdominal pain, nausea and vomiting.   Skin:  Negative for color change.   Neurological:  Positive for light-headedness. Negative for syncope.           Objective       ED Triage Vitals [01/04/25 1203]   Temperature Pulse Blood Pressure Respirations SpO2 Patient Position - Orthostatic VS   98.4 °F (36.9 °C) 104 115/72 20 100 % Sitting      Temp Source Heart Rate Source BP Location FiO2 (%) Pain Score    Temporal Monitor Left arm -- 4      Vitals      Date and Time Temp Pulse SpO2 Resp BP Pain Score FACES Pain Rating User   01/04/25 2045 -- 104 97 % -- 118/75 -- -- JT   01/04/25 1915 -- 104 97 % 19 106/61 -- -- JT   01/04/25 1800 99.1 °F (37.3 °C) 108 99 % 20 113/71 -- -- BG   01/04/25 1700 -- 106 100 % 20 108/70 4 -- BG   01/04/25 1455 99.2 °F (37.3 °C) 102 97 % 20 97/60 -- -- HB   01/04/25 1258 -- 107 100 % 18 94/68 -- -- RT   01/04/25 1203 98.4 °F (36.9 °C) 104 100 % 20 115/72 4 -- HB            Physical Exam  Vitals and nursing note reviewed.   Constitutional:       General: He is not in acute distress.     Appearance: Normal appearance. He is normal weight. He is not ill-appearing, toxic-appearing or diaphoretic.   HENT:      Head: Normocephalic and atraumatic.      Nose: No rhinorrhea.      Mouth/Throat:      Mouth: Mucous membranes are moist.   Eyes:      Extraocular Movements: Extraocular movements intact.      Conjunctiva/sclera: Conjunctivae normal.      Pupils: Pupils are equal, round, and reactive to light.   Cardiovascular:      Rate and Rhythm: Normal rate and regular rhythm.      Pulses: Normal pulses.      Heart sounds: Normal heart sounds.   Pulmonary:       Effort: Pulmonary effort is normal. No respiratory distress.      Breath sounds: No rhonchi or rales.      Comments: Bilateral diffuse expiratory wheezing  Abdominal:      General: Abdomen is flat.      Tenderness: There is no abdominal tenderness.   Musculoskeletal:      Cervical back: Neck supple.      Right lower leg: No edema.      Left lower leg: No edema.   Skin:     General: Skin is warm and dry.      Capillary Refill: Capillary refill takes less than 2 seconds.   Neurological:      Mental Status: He is alert and oriented to person, place, and time.         Results Reviewed       Procedure Component Value Units Date/Time    HS Troponin I 4hr [[id number]]  (Normal) Collected: 01/04/25 1920    Lab Status: Final result Specimen: Blood from Arm, Left Updated: 01/04/25 2117     hs TnI 4hr 37 ng/L      Delta 4hr hsTnI 6 ng/L     HS Troponin I 2hr [[id number]]  (Normal) Collected: 01/04/25 1458    Lab Status: Final result Specimen: Blood from Hand, Left Updated: 01/04/25 2001     hs TnI 2hr 32 ng/L      Delta 2hr hsTnI 1 ng/L     HS Troponin 0hr (reflex protocol) [[id number]]  (Normal) Collected: 01/04/25 1208    Lab Status: Final result Specimen: Blood from Arm, Left Updated: 01/04/25 1239     hs TnI 0hr 31 ng/L     Comprehensive metabolic panel [[id number]]  (Abnormal) Collected: 01/04/25 1208    Lab Status: Final result Specimen: Blood from Arm, Left Updated: 01/04/25 1236     Sodium 133 mmol/L      Potassium 3.7 mmol/L      Chloride 93 mmol/L      CO2 29 mmol/L      ANION GAP 11 mmol/L      BUN 20 mg/dL      Creatinine 1.27 mg/dL      Glucose 77 mg/dL      Calcium 9.5 mg/dL      AST 15 U/L      ALT 28 U/L      Alkaline Phosphatase 87 U/L      Total Protein 6.4 g/dL      Albumin 4.1 g/dL      Total Bilirubin 1.17 mg/dL      eGFR 65 ml/min/1.73sq m     Narrative:      National Kidney Disease Foundation guidelines for Chronic Kidney Disease (CKD):     Stage 1 with normal or high GFR (GFR > 90 mL/min/1.73 square  meters)    Stage 2 Mild CKD (GFR = 60-89 mL/min/1.73 square meters)    Stage 3A Moderate CKD (GFR = 45-59 mL/min/1.73 square meters)    Stage 3B Moderate CKD (GFR = 30-44 mL/min/1.73 square meters)    Stage 4 Severe CKD (GFR = 15-29 mL/min/1.73 square meters)    Stage 5 End Stage CKD (GFR <15 mL/min/1.73 square meters)  Note: GFR calculation is accurate only with a steady state creatinine    CBC and differential [[id number]]  (Abnormal) Collected: 01/04/25 1208    Lab Status: Final result Specimen: Blood from Arm, Left Updated: 01/04/25 1223     WBC 9.89 Thousand/uL      RBC 4.87 Million/uL      Hemoglobin 14.9 g/dL      Hematocrit 46.6 %      MCV 96 fL      MCH 30.6 pg      MCHC 32.0 g/dL      RDW 14.3 %      MPV 10.0 fL      Platelets 244 Thousands/uL      nRBC 0 /100 WBCs      Segmented % 88 %      Immature Grans % 0 %      Lymphocytes % 8 %      Monocytes % 4 %      Eosinophils Relative 0 %      Basophils Relative 0 %      Absolute Neutrophils 8.61 Thousands/µL      Absolute Immature Grans 0.03 Thousand/uL      Absolute Lymphocytes 0.74 Thousands/µL      Absolute Monocytes 0.44 Thousand/µL      Eosinophils Absolute 0.03 Thousand/µL      Basophils Absolute 0.04 Thousands/µL             XR chest 1 view portable   Final Interpretation by Brad Kenney MD (01/04 3007)      No acute cardiopulmonary disease.            Workstation performed: [id number]             Procedures    ED Medication and Procedure Management   Prior to Admission Medications   Prescriptions Last Dose Informant Patient Reported? Taking?   ALPRAZolam (XANAX) 1 mg tablet   No No   Sig: Take 1 tablet (1 mg total) by mouth daily take 1 tablet by mouth four times a day for anxiety   Empagliflozin (JARDIANCE) 10 MG TABS tablet   No No   Sig: Take 1 tablet (10 mg total) by mouth every morning Do not start before December 26, 2024.   albuterol (Ventolin HFA) 90 mcg/act inhaler   No No   Sig: Inhale 2 puffs 4 (four) times a day   aspirin 81  mg chewable tablet   Yes No   Sig: Chew 81 mg daily   metoprolol succinate (TOPROL-XL) 25 mg 24 hr tablet   No No   Sig: Take 1 tablet (25 mg total) by mouth 2 (two) times a day   omeprazole (PriLOSEC) 40 MG capsule   No No   Sig: Take 1 capsule (40 mg total) by mouth daily   potassium chloride (Klor-Con M20) 20 mEq tablet   No No   Sig: Take 1 tablet (20 mEq total) by mouth daily   rosuvastatin (CRESTOR) 5 mg tablet   Yes No   Sig: Take 5 mg by mouth daily   sacubitril-valsartan (ENTRESTO) 24-26 MG TABS   No No   Sig: Take 1 tablet by mouth 2 (two) times a day Do not start before December 26, 2024.   torsemide (DEMADEX) 20 mg tablet   No No   Sig: Take 2 tablets (40 mg total) by mouth 2 (two) times a day      Facility-Administered Medications: None     Patient's Medications   Discharge Prescriptions    No medications on file     No discharge procedures on file.  ED SEPSIS DOCUMENTATION   Time reflects when diagnosis was documented in both MDM as applicable and the Disposition within this note       Time User Action Codes Description Comment    1/4/2025  4:40 PM Roberto Toribio [R55] Near syncope     1/4/2025  9:43 PM Irwin Thomason [I50.42] Chronic combined systolic and diastolic heart failure (HCC)                  Roberto Toribio DO  01/05/25 0141

## 2025-01-05 PROBLEM — J10.1 INFLUENZA A: Status: ACTIVE | Noted: 2025-01-05

## 2025-01-05 PROBLEM — N18.31 ACUTE RENAL FAILURE SUPERIMPOSED ON STAGE 3A CHRONIC KIDNEY DISEASE (HCC): Status: ACTIVE | Noted: 2024-12-18

## 2025-01-05 LAB
ANION GAP SERPL CALCULATED.3IONS-SCNC: 11 MMOL/L (ref 4–13)
ATRIAL RATE: 106 BPM
BNP SERPL-MCNC: 3563 PG/ML (ref 0–100)
BUN SERPL-MCNC: 19 MG/DL (ref 5–25)
CALCIUM SERPL-MCNC: 9.1 MG/DL (ref 8.4–10.2)
CHLORIDE SERPL-SCNC: 92 MMOL/L (ref 96–108)
CO2 SERPL-SCNC: 25 MMOL/L (ref 21–32)
CREAT SERPL-MCNC: 1.54 MG/DL (ref 0.6–1.3)
ERYTHROCYTE [DISTWIDTH] IN BLOOD BY AUTOMATED COUNT: 14.6 % (ref 11.6–15.1)
FLUAV RNA RESP QL NAA+PROBE: POSITIVE
FLUBV RNA RESP QL NAA+PROBE: NEGATIVE
GFR SERPL CREATININE-BSD FRML MDRD: 52 ML/MIN/1.73SQ M
GLUCOSE SERPL-MCNC: 119 MG/DL (ref 65–140)
GLUCOSE SERPL-MCNC: 140 MG/DL (ref 65–140)
HCT VFR BLD AUTO: 45.4 % (ref 36.5–49.3)
HGB BLD-MCNC: 14.7 G/DL (ref 12–17)
MAGNESIUM SERPL-MCNC: 1.9 MG/DL (ref 1.9–2.7)
MCH RBC QN AUTO: 30.1 PG (ref 26.8–34.3)
MCHC RBC AUTO-ENTMCNC: 32.4 G/DL (ref 31.4–37.4)
MCV RBC AUTO: 93 FL (ref 82–98)
P AXIS: 76 DEGREES
PLATELET # BLD AUTO: 221 THOUSANDS/UL (ref 149–390)
PMV BLD AUTO: 10.5 FL (ref 8.9–12.7)
POTASSIUM SERPL-SCNC: 4.5 MMOL/L (ref 3.5–5.3)
PR INTERVAL: 186 MS
QRS AXIS: 164 DEGREES
QRSD INTERVAL: 118 MS
QT INTERVAL: 362 MS
QTC INTERVAL: 481 MS
RBC # BLD AUTO: 4.88 MILLION/UL (ref 3.88–5.62)
RSV RNA RESP QL NAA+PROBE: NEGATIVE
SARS-COV-2 RNA RESP QL NAA+PROBE: NEGATIVE
SODIUM SERPL-SCNC: 128 MMOL/L (ref 135–147)
T WAVE AXIS: 58 DEGREES
VENTRICULAR RATE: 106 BPM
WBC # BLD AUTO: 8.56 THOUSAND/UL (ref 4.31–10.16)

## 2025-01-05 PROCEDURE — 99214 OFFICE O/P EST MOD 30 MIN: CPT | Performed by: INTERNAL MEDICINE

## 2025-01-05 PROCEDURE — 80048 BASIC METABOLIC PNL TOTAL CA: CPT | Performed by: INTERNAL MEDICINE

## 2025-01-05 PROCEDURE — 0241U HB NFCT DS VIR RESP RNA 4 TRGT: CPT | Performed by: NURSE PRACTITIONER

## 2025-01-05 PROCEDURE — 83880 ASSAY OF NATRIURETIC PEPTIDE: CPT | Performed by: NURSE PRACTITIONER

## 2025-01-05 PROCEDURE — 82948 REAGENT STRIP/BLOOD GLUCOSE: CPT

## 2025-01-05 PROCEDURE — 83735 ASSAY OF MAGNESIUM: CPT | Performed by: INTERNAL MEDICINE

## 2025-01-05 PROCEDURE — 93005 ELECTROCARDIOGRAM TRACING: CPT

## 2025-01-05 PROCEDURE — 99232 SBSQ HOSP IP/OBS MODERATE 35: CPT | Performed by: NURSE PRACTITIONER

## 2025-01-05 PROCEDURE — 36415 COLL VENOUS BLD VENIPUNCTURE: CPT | Performed by: INTERNAL MEDICINE

## 2025-01-05 PROCEDURE — 93010 ELECTROCARDIOGRAM REPORT: CPT | Performed by: INTERNAL MEDICINE

## 2025-01-05 PROCEDURE — 94664 DEMO&/EVAL PT USE INHALER: CPT

## 2025-01-05 PROCEDURE — 94760 N-INVAS EAR/PLS OXIMETRY 1: CPT

## 2025-01-05 PROCEDURE — 85027 COMPLETE CBC AUTOMATED: CPT | Performed by: INTERNAL MEDICINE

## 2025-01-05 PROCEDURE — 94640 AIRWAY INHALATION TREATMENT: CPT

## 2025-01-05 RX ORDER — OSELTAMIVIR PHOSPHATE 75 MG/1
75 CAPSULE ORAL EVERY 12 HOURS SCHEDULED
Status: DISCONTINUED | OUTPATIENT
Start: 2025-01-05 | End: 2025-01-07 | Stop reason: HOSPADM

## 2025-01-05 RX ORDER — LEVALBUTEROL INHALATION SOLUTION 1.25 MG/3ML
1.25 SOLUTION RESPIRATORY (INHALATION)
Status: DISCONTINUED | OUTPATIENT
Start: 2025-01-05 | End: 2025-01-07 | Stop reason: HOSPADM

## 2025-01-05 RX ORDER — ALBUTEROL SULFATE 90 UG/1
2 INHALANT RESPIRATORY (INHALATION) EVERY 4 HOURS PRN
Status: DISCONTINUED | OUTPATIENT
Start: 2025-01-05 | End: 2025-01-07 | Stop reason: HOSPADM

## 2025-01-05 RX ORDER — METOPROLOL SUCCINATE 25 MG/1
12.5 TABLET, EXTENDED RELEASE ORAL 2 TIMES DAILY
Status: DISCONTINUED | OUTPATIENT
Start: 2025-01-05 | End: 2025-01-07 | Stop reason: HOSPADM

## 2025-01-05 RX ORDER — ALPRAZOLAM 0.5 MG
1 TABLET ORAL 3 TIMES DAILY PRN
Status: DISCONTINUED | OUTPATIENT
Start: 2025-01-05 | End: 2025-01-07 | Stop reason: HOSPADM

## 2025-01-05 RX ORDER — FUROSEMIDE 10 MG/ML
40 INJECTION INTRAMUSCULAR; INTRAVENOUS
Status: DISCONTINUED | OUTPATIENT
Start: 2025-01-05 | End: 2025-01-06

## 2025-01-05 RX ADMIN — LEVALBUTEROL HYDROCHLORIDE 1.25 MG: 1.25 SOLUTION RESPIRATORY (INHALATION) at 19:45

## 2025-01-05 RX ADMIN — OSELTAMIVIR PHOSPHATE 75 MG: 75 CAPSULE ORAL at 16:41

## 2025-01-05 RX ADMIN — FUROSEMIDE 40 MG: 10 INJECTION, SOLUTION INTRAMUSCULAR; INTRAVENOUS at 21:23

## 2025-01-05 RX ADMIN — IPRATROPIUM BROMIDE 0.5 MG: 0.5 SOLUTION RESPIRATORY (INHALATION) at 16:22

## 2025-01-05 RX ADMIN — IPRATROPIUM BROMIDE 0.5 MG: 0.5 SOLUTION RESPIRATORY (INHALATION) at 07:39

## 2025-01-05 RX ADMIN — LEVALBUTEROL HYDROCHLORIDE 1.25 MG: 1.25 SOLUTION RESPIRATORY (INHALATION) at 07:39

## 2025-01-05 RX ADMIN — HEPARIN SODIUM 5000 UNITS: 5000 INJECTION, SOLUTION INTRAVENOUS; SUBCUTANEOUS at 21:23

## 2025-01-05 RX ADMIN — ALPRAZOLAM 1 MG: 0.5 TABLET ORAL at 05:21

## 2025-01-05 RX ADMIN — IPRATROPIUM BROMIDE 0.5 MG: 0.5 SOLUTION RESPIRATORY (INHALATION) at 19:45

## 2025-01-05 RX ADMIN — ASPIRIN 81 MG CHEWABLE TABLET 81 MG: 81 TABLET CHEWABLE at 11:06

## 2025-01-05 RX ADMIN — LEVALBUTEROL HYDROCHLORIDE 1.25 MG: 1.25 SOLUTION RESPIRATORY (INHALATION) at 16:22

## 2025-01-05 RX ADMIN — METOPROLOL SUCCINATE 12.5 MG: 25 TABLET, EXTENDED RELEASE ORAL at 13:30

## 2025-01-05 RX ADMIN — METOPROLOL SUCCINATE 12.5 MG: 25 TABLET, EXTENDED RELEASE ORAL at 21:23

## 2025-01-05 RX ADMIN — ALPRAZOLAM 1 MG: 0.5 TABLET ORAL at 15:44

## 2025-01-05 RX ADMIN — FUROSEMIDE 40 MG: 10 INJECTION, SOLUTION INTRAMUSCULAR; INTRAVENOUS at 13:30

## 2025-01-05 RX ADMIN — PRAVASTATIN SODIUM 40 MG: 40 TABLET ORAL at 15:41

## 2025-01-05 RX ADMIN — PANTOPRAZOLE SODIUM 40 MG: 40 TABLET, DELAYED RELEASE ORAL at 05:02

## 2025-01-05 NOTE — RESPIRATORY THERAPY NOTE
RT Protocol Note  Martell Mejía 49 y.o. male MRN: [id number]  Unit/Bed#: ED 27 Encounter: [id number]    Assessment    Principal Problem:    Near syncope  Active Problems:    Anxiety    Chronic combined systolic and diastolic heart failure (HCC)    Tetrahydrocannabinol (THC) use disorder, mild, abuse      Home Pulmonary Medications:  Albuterol       Past Medical History:   Diagnosis Date    Allergic     Anxiety     Depression     GERD (gastroesophageal reflux disease)     Hyperlipidemia     Hypertension      Social History     Socioeconomic History    Marital status: /Civil Union     Spouse name: None    Number of children: None    Years of education: None    Highest education level: None   Occupational History    Occupation: employed   Tobacco Use    Smoking status: Former     Current packs/day: 0.00     Types: Cigarettes     Quit date: 2011     Years since quittin.5    Smokeless tobacco: Never   Vaping Use    Vaping status: Never Used   Substance and Sexual Activity    Alcohol use: Yes     Comment: seldom    Drug use: Not Currently     Types: Marijuana    Sexual activity: Yes     Partners: Female   Other Topics Concern    None   Social History Narrative    None     Social Drivers of Health     Financial Resource Strain: Not on file   Food Insecurity: No Food Insecurity (2024)    Nursing - Inadequate Food Risk Classification     Worried About Running Out of Food in the Last Year: Not on file     Ran Out of Food in the Last Year: Not on file     Ran Out of Food in the Last Year: Never true   Transportation Needs: No Transportation Needs (2024)    Nursing - Transportation Risk Classification     Lack of Transportation: Not on file     Lack of Transportation: No   Physical Activity: Not on file   Stress: Not on file   Social Connections: Not on file   Intimate Partner Violence: Unknown (2024)    Nursing IPS     Feels Physically and Emotionally Safe: Not on file     Physically Hurt by  "Someone: Not on file     Humiliated or Emotionally Abused by Someone: Not on file     Physically Hurt by Someone: No     Hurt or Threatened by Someone: No   Housing Stability: Unknown (2024)    Nursing: Inadequate Housing Risk Classification     Has Housing: Not on file     Worried About Losing Housing: Not on file     Unable to Get Utilities: Not on file     Unable to Pay for Housing in the Last Year: No     Has Housin       Subjective         Objective    Physical Exam:   Assessment Type: Assess only  General Appearance: Alert, Awake  Respiratory Pattern: Normal  Chest Assessment: Chest expansion symmetrical  Bilateral Breath Sounds: Diminished, Expiratory wheezes  O2 Device: (P) t    Vitals:  Blood pressure 115/80, pulse (!) 120, temperature 99.1 °F (37.3 °C), resp. rate 18, height 5' 10\" (1.778 m), weight 94.3 kg (208 lb), SpO2 98%.          Imaging and other studies: Results Review Statement: No pertinent imaging studies reviewed.    O2 Device: (P) t     Plan    Respiratory Plan: No distress/Pulmonary history        Resp Comments: (P) Pt with history of CHF, pt wears life vest. Pt states he had childhood asthma but not as an adult. Pt states he uses albuterol inhaler 4x daily. Pt denies issues with breathing at the current moment, pt states he has breathing issues when he gets lightheaded from coughing fits and cardiac issues. Pt says he would like to keep tid treatments as they help break up his cough. Pt not in resp distress, on RA, bs diminished with faint expiratory wheeze. Will keep pt tid treatments, will order resp protocol and prn mdi for pt.   "

## 2025-01-05 NOTE — QUICK NOTE
-Patient reporting feeling anxious and requesting to go back to 1mg Xanax dose. Will switch back to 1mg TID prn

## 2025-01-05 NOTE — ASSESSMENT & PLAN NOTE
Patient does report using THC and appears he has been informed of need to refrain from THC use  C/w encouraging ongoing cessation

## 2025-01-05 NOTE — PROGRESS NOTES
"Progress Note - Hospitalist   Name: Martell Mejía 49 y.o. male I MRN: [id number]  Unit/Bed#: ED 27 I Date of Admission: 1/4/2025   Date of Service: 1/5/2025 I Hospital Day: 0    Assessment & Plan  Near syncope  Presented to the ER after having episodes of coughing resulting in near syncopal episodes  Extensive cardiac history with known cardiomyopathy which is being further evaluated by cardiology outpatient- see CHF plan below  Recently saw cardiology outpatient d/t increased cough for which he was given IV Lasix 100 mg in the clinic as well as his diuretics switched to torsemide 40 mg twice daily from Lasix 80 in the morning and 40 in the p.m.  Patient reports that his lower extremity edema has improved although he does endorse continued episodes of coughing  recent outpatient cardiac MRI showing EF 8% and work up into the etiology of this is currently ongoing with cardiology outpatient.  EKG sinus tachycardia, right bundle branch block (seen on prior EKG), QTc 41  Trop 31-->33-->37  Denied any chest pain  Chest x-ray 1/4- no acute cardiopulmonary process  Pt reported feeling dehydrated on admission   Patient additionally reported that he is on chronic Xanax 1 mg 4 times daily as needed which he stopped taking on his own accord 2 days ago because he \"wanted to try and get off of it\"; has been on this medication for greater than 6 months  Consult cardiology   Monitor on tele for possible arrhythmias.    Hold diuretics as well as his Entresto. continue his beta-blocker  Check f/u/RSV/COVID  Chronic combined systolic and diastolic heart failure (HCC)  Wt Readings from Last 3 Encounters:   01/04/25 94.3 kg (208 lb)   12/31/24 97.2 kg (214 lb 3.2 oz)   12/25/24 91.7 kg (202 lb 3.2 oz)     Reviewed most recent CV outpatient note with Grace on 12/31  Most recent echo with LVEF 15% with BiV failure, nondilated LV-->new dx in October 2024  Cardiac MRI on 1/2: \"Moderately dilated left ventricle with severely reduced systolic " "function, EF 8%. Severe global hypokinesis with regional variation. Mildly dilated right ventricle with severely reduced systolic function, EF 12%. Moderately dilated bilateral atria. Tricuspid regurgitation visualized. Possible focal transmural scarring of the apical inferior wall, which could represent a small distal infarct. Focal late gadolinium enhancement at the inferior RV insertion point, nonspecific, but can be seen with idiopathic dilated cardiomyopathy.\"  Inferior Infarct by NST/PVCs  Appeared volume up at CV appointment per note on 12/31 and was given IV Lasix 100mg in the clinic and then PO Lasix Dc'd and switched to torsemide 40mg bid; cardiology additionally noted cardiac MRI that was ordered to further evaluate reduced LVEF  PTA on Toprol 25 mg BID, Entresto 24-26 BID  Currently has a LifeVest from [facility]  Given the concern for dehydration on admission entresto and diuretics held. C/w Toprol  Heart failure team consultation pending     Acute renal failure superimposed on stage 3a chronic kidney disease (HCC)  Lab Results   Component Value Date    EGFR 52 01/05/2025    EGFR 65 01/04/2025    EGFR 64 01/03/2025    CREATININE 1.54 (H) 01/05/2025    CREATININE 1.27 01/04/2025    CREATININE 1.29 01/03/2025   Baseline creatinine 1.2-1.4. creatinine today 1.54- elevated likely in the setting of dehydration   Holding entresto and diuretics   Avoid hypotension, nephrotoxins, and NSAIDS if possible    D/w HF team, will defer need for IVFs to cardiology given cardiac history   Strict I & Os  Tetrahydrocannabinol (THC) use disorder, mild, abuse  Patient does report using THC and appears he has been informed of need to refrain from THC use  C/w encouraging ongoing cessation   Anxiety  Pt has been taking Xanax 1 mg 4 times daily PRN up until 2 days ago when he stopped it on his own accord as an attempt to get off the medication. He has been on it for greater than 6 months  While admitted will continue with Xanax 0.5 " mg 3 times daily PRN  Hyponatremia   today  Likely secondary to dehydration   Entresto and diuretics on hold  Monitor- repeat am bmp   Influenza A  Tested positive for influenza A- will add tamiflu  Supportive care     VTE Pharmacologic Prophylaxis: VTE Score: 2 Moderate Risk (Score 3-4) - Pharmacological DVT Prophylaxis Ordered: heparin.    Mobility:      [facility] Goal achieved. Continue to encourage appropriate mobility.    Patient Centered Rounds: I performed bedside rounds with nursing staff today.   Discussions with Specialists or Other Care Team Provider: joanna/w RN and DR. Power with heart failure team     Education and Discussions with Family / Patient: Patient declined call to .     Current Length of Stay: 0 day(s)  Current Patient Status: Observation   Certification Statement: the patient is currently admitted to observation   Discharge Plan: Anticipate discharge in 24-48 hrs to home.    Code Status: Level 1 - Full Code    Subjective   Pt returning from going to the bathroom to his room in the ER. He reports that he is doing better today, no longer feels like he's going to pass out or black out when he coughs. Reports his cough started 2 nights ago, denies fevers or sick contacts, denies sob. Denies chest pain. Denies n/v/d.     Objective :  Temp:  [98.4 °F (36.9 °C)-99.2 °F (37.3 °C)] 99.1 °F (37.3 °C)  HR:  [102-132] 120  BP: ()/(60-86) 115/80  Resp:  [18-20] 18  SpO2:  [97 %-100 %] 98 %  O2 Device: None (Room air)    Body mass index is 29.84 kg/m².     Input and Output Summary (last 24 hours):   No intake or output data in the 24 hours ending 01/05/25 0943    Physical Exam  Constitutional:       General: He is not in acute distress.     Appearance: He is not ill-appearing.   Cardiovascular:      Rate and Rhythm: Normal rate and regular rhythm.      Pulses: Normal pulses.      Heart sounds: Normal heart sounds. No murmur heard.  Pulmonary:      Effort: No respiratory distress.       Breath sounds: Normal breath sounds. No wheezing or rales.   Abdominal:      General: Bowel sounds are normal. There is no distension.      Palpations: Abdomen is soft.      Tenderness: There is no abdominal tenderness.   Musculoskeletal:         General: No swelling or tenderness.   Skin:     General: Skin is warm and dry.      Findings: No erythema or rash.   Neurological:      General: No focal deficit present.      Mental Status: He is alert. Mental status is at baseline.   Psychiatric:         Attention and Perception: Attention normal.         Mood and Affect: Mood normal.           Lines/Drains:        Telemetry:  Telemetry Orders (From admission, onward)               24 Hour Telemetry Monitoring  Continuous x 24 Hours (Telem)        Expiring   Question:  Reason for 24 Hour Telemetry  Answer:  Syncope suspected to be cardiac in origin                        Indication for Continued Telemetry Use: Lifevest (remains on tele entire hospital stay)               Lab Results: I have reviewed the following results:   Results from last 7 days   Lab Units 01/05/25  0507 01/04/25  1208   WBC Thousand/uL 8.56 9.89   HEMOGLOBIN g/dL 14.7 14.9   HEMATOCRIT % 45.4 46.6   PLATELETS Thousands/uL 221 244   SEGS PCT %  --  88*   LYMPHO PCT %  --  8*   MONO PCT %  --  4   EOS PCT %  --  0     Results from last 7 days   Lab Units 01/05/25  0507 01/04/25  1208   SODIUM mmol/L 128* 133*   POTASSIUM mmol/L 4.5 3.7   CHLORIDE mmol/L 92* 93*   CO2 mmol/L 25 29   BUN mg/dL 19 20   CREATININE mg/dL 1.54* 1.27   ANION GAP mmol/L 11 11   CALCIUM mg/dL 9.1 9.5   ALBUMIN g/dL  --  4.1   TOTAL BILIRUBIN mg/dL  --  1.17*   ALK PHOS U/L  --  87   ALT U/L  --  28   AST U/L  --  15   GLUCOSE RANDOM mg/dL 140 77                       Recent Cultures (last 7 days):         Imaging Results Review: I reviewed radiology reports from this admission including: chest xray.  Other Study Results Review: No additional pertinent studies reviewed.    Last  24 Hours Medication List:     Current Facility-Administered Medications:     acetaminophen (TYLENOL) tablet 650 mg, Q6H PRN    albuterol (PROVENTIL HFA,VENTOLIN HFA) inhaler 2 puff, Q4H PRN    ALPRAZolam (XANAX) tablet 1 mg, TID PRN    aspirin chewable tablet 81 mg, Daily    heparin (porcine) subcutaneous injection 5,000 Units, Q8H ALEJANDRO    ipratropium (ATROVENT) 0.02 % inhalation solution 0.5 mg, TID    levalbuterol (XOPENEX) inhalation solution 1.25 mg, TID    metoprolol succinate (TOPROL-XL) 24 hr tablet 25 mg, BID    pantoprazole (PROTONIX) EC tablet 40 mg, Early Morning    potassium chloride (Klor-Con M20) CR tablet 20 mEq, Daily    pravastatin (PRAVACHOL) tablet 40 mg, Daily With Dinner    Administrative Statements   Today, Patient Was Seen By: CANDY Soto      **Please Note: This note may have been constructed using a voice recognition system.**

## 2025-01-05 NOTE — ASSESSMENT & PLAN NOTE
"Wt Readings from Last 3 Encounters:   01/04/25 94.3 kg (208 lb)   12/31/24 97.2 kg (214 lb 3.2 oz)   12/25/24 91.7 kg (202 lb 3.2 oz)     Reviewed most recent CV outpatient note with Grace on 12/31  Most recent echo with LVEF 15% with BiV failure, nondilated LV-->new dx in October 2024  Cardiac MRI on 1/2: \"Moderately dilated left ventricle with severely reduced systolic function, EF 8%. Severe global hypokinesis with regional variation. Mildly dilated right ventricle with severely reduced systolic function, EF 12%. Moderately dilated bilateral atria. Tricuspid regurgitation visualized. Possible focal transmural scarring of the apical inferior wall, which could represent a small distal infarct. Focal late gadolinium enhancement at the inferior RV insertion point, nonspecific, but can be seen with idiopathic dilated cardiomyopathy.\"  Inferior Infarct by NST/PVCs  Appeared volume up at CV appointment per note on 12/31 and was given IV Lasix 100mg in the clinic and then PO Lasix Dc'd and switched to torsemide 40mg bid; cardiology additionally noted cardiac MRI that was ordered to further evaluate reduced LVEF  PTA on Toprol 25 mg BID, Entresto 24-26 BID  Currently has a LifeVest from [facility]  Given the concern for dehydration on admission entresto and diuretics held. C/w Toprol  Heart failure team consultation pending     "

## 2025-01-05 NOTE — CONSULTS
"Heart Failure Consult Note - Martell Mejía 49 y.o. male MRN: [id number]    @ Encounter: [id number]      Assessment/Plan:    # Near syncope  In setting of excessive coughing from flu and volume overload    # Acute on chronic heart failure with reduced ejection fraction  Etiology: nonischemic. Prior  infarct noted on NPI but felt to be nonischemic  Recent decompensation on outpatient follow up and now in setting of Influenza A    BNP: 1461 12/18/24; 3563 1/5/25    Studies- personally reviewed by me  Cardiac MRI:  LVEF 12%  LVEDD 72cm  Mildly dilated RV with severely reduced systolic function  Possible focal transmural scarring of the apical inferior wall, which could represent a small distal infarct. Focal late gadolinium enhancement at the inferior RV insertion point, nonspecific, but can be seen with idiopathic dilated cardiomyopathy.     TTE 12/19/24: LVEF 15%, LVIDd 5.8 cm, RV function reduced, mild to mod TR, mild MR, est RAP 10 mmHg,      11/21/2024 nuclear pharmacologic stress test performed at [facility]:  There is a fixed, large size, severe intensity perfusion defect in the basal to apical inferior, inferoseptal and inferolateral wall segments, which does not improve with pronation. In the presence of akinesis, this is suggestive of infarct.    11/26/2024 TTE at [facility]:  LVEF \"less than 20%\", global hypokinesis, indeterminate diastolic function.  No evidence of apical thrombus with Definity contrast.  R, mildly dilated, systolic function moderately reduced.  Moderately dilated LA, mildly dilated RA.  Trace MR, mild TR, mild LA  Right atrial pressure estimated at 5 to 10 mmHg     2/25/2013 TTE at [facility]:  LVEF 60%, normal RV and atria     Neurohormonal Blockade:   --Beta-Blocker: metoprolol succinate 25mg BID - on hold  --ACEi, ARB or ARNi: entresto 24-26mg BID - on hold    (or SVR reduction)  --Aldosterone Receptor Blocker: none  --SGLT2-I: Jardiance 10mg daily - on hold     Volume management:  --Home Diuretic: torsemide " 40mg po BID  --Inpatient diuretic: none    Sudden Cardiac Death Risk Reduction:  --ICD: Wearing LifeVest    Electrical Resynchronization:  --Candidacy for BiV device: RBBB, -118 msec    Advanced Therapies (if appropriate):  --Inotrope:  --LVAD/Transplant Candidacy:      # AL  Recent baseline appears to be between 1.27-1.45, 1.54 on presentation. Likely due to venous congestion    # Hyponatremia, likely due to CHF    # H/o Frequent PVCs, NSVT  Likely due to severely reduced LV function, ?possible scar mediated given large infarct on recent nuclear stress imaging.  Continue to monitor on telemetry  Metoprolol as below    # Hypercholesterolemia     # Anxiety  Per primary     # Gastroesophageal reflux disease with esophagitis    # VSD repaired 9 months, followed [facility] x 16 years as child  No toxic habits except +THC  +FH cardiac dz young age    # Influenza A positive    Today's Plan:  Patient appears volume up, lukewarm on exam with elevated JVP but seems improved based on exam findings noted on outpatient follow up with escalation of diuretic with office IV lasix and transitioning to torsemide  He was having cough the last couple of days and tested positive for influenza A  Will continue diuresis with IV lasix 40 BID and will escalate dose as needed  Will give reduced dose of metoprolol at 12.5mg BID due to low BP and decompensated state  Tamiflu per primary  Hold Jardiance for now      HPI:   49-year-old male with past medical history of nonischemic cardiomyopathy who presented with near syncope.  Patient woke up with a coughing fit the night before prior to admission and felt like almost passing out.  He continues to have these episodes prompting ED visit.  He was last seen in cardiology follow-up by Dr. Edwards on December 31st.  He was noted to be volume overloaded at that time with cough, shortness of breath, elevated JVP, and bilateral lower extremity edema.  He was given a dose of 100 mg IV Lasix in the  office and transitioned furosemide to torsemide 40 mg twice daily.  He reports improvement in symptoms at that time and lower extremity edema resolved.  He denies any fever and chills.  Cough is nonproductive.  Denies abdominal distention.  No dizziness or lightheadedness outside of coughing fits.  He tested positive for influenza A.      Past Medical History:   Diagnosis Date    Allergic     Anxiety     Depression     GERD (gastroesophageal reflux disease)     Hyperlipidemia     Hypertension        Review of Systems   Constitutional:  Negative for chills and fever.   Respiratory:  Positive for cough. Negative for chest tightness.    Cardiovascular:  Negative for chest pain, palpitations and leg swelling.   Gastrointestinal:  Negative for abdominal distention, nausea and vomiting.        No Known Allergies  .    Current Facility-Administered Medications:     acetaminophen (TYLENOL) tablet 650 mg, 650 mg, Oral, Q6H PRN, Irwin Thomason DO    albuterol (PROVENTIL HFA,VENTOLIN HFA) inhaler 2 puff, 2 puff, Inhalation, Q4H PRN, Doc Bach MD    ALPRAZolam (XANAX) tablet 1 mg, 1 mg, Oral, TID PRN, Irwin Thomason DO, 1 mg at 01/05/25 0521    aspirin chewable tablet 81 mg, 81 mg, Oral, Daily, Irwin Thomason DO, 81 mg at 01/05/25 1106    heparin (porcine) subcutaneous injection 5,000 Units, 5,000 Units, Subcutaneous, Q8H ALEJANDRO, Irwin Thomason DO    ipratropium (ATROVENT) 0.02 % inhalation solution 0.5 mg, 0.5 mg, Nebulization, TID, Irwin Thomason DO, 0.5 mg at 01/05/25 0739    levalbuterol (XOPENEX) inhalation solution 1.25 mg, 1.25 mg, Nebulization, TID, Irwin Thomason DO, 1.25 mg at 01/05/25 0739    metoprolol succinate (TOPROL-XL) 24 hr tablet 25 mg, 25 mg, Oral, BID, Irwin Thomason DO, 25 mg at 01/04/25 2038    pantoprazole (PROTONIX) EC tablet 40 mg, 40 mg, Oral, Early Morning, Irwin Thomason DO, 40 mg at 01/05/25 0502    potassium chloride (Klor-Con M20) CR tablet 20 mEq, 20 mEq, Oral, Daily, Irwin Thomason,  DO    pravastatin (PRAVACHOL) tablet 40 mg, 40 mg, Oral, Daily With Dinner, Irwin Thomason, DO    Current Outpatient Medications:     albuterol (Ventolin HFA) 90 mcg/act inhaler, Inhale 2 puffs 4 (four) times a day, Disp: 1 g, Rfl: 0    ALPRAZolam (XANAX) 1 mg tablet, Take 1 tablet (1 mg total) by mouth daily take 1 tablet by mouth four times a day for anxiety, Disp: 120 tablet, Rfl: 5    aspirin 81 mg chewable tablet, Chew 81 mg daily, Disp: , Rfl:     Empagliflozin (JARDIANCE) 10 MG TABS tablet, Take 1 tablet (10 mg total) by mouth every morning Do not start before 2024., Disp: , Rfl:     metoprolol succinate (TOPROL-XL) 25 mg 24 hr tablet, Take 1 tablet (25 mg total) by mouth 2 (two) times a day, Disp: 60 tablet, Rfl: 0    omeprazole (PriLOSEC) 40 MG capsule, Take 1 capsule (40 mg total) by mouth daily, Disp: 90 capsule, Rfl: 1    potassium chloride (Klor-Con M20) 20 mEq tablet, Take 1 tablet (20 mEq total) by mouth daily, Disp: 30 tablet, Rfl: 17    rosuvastatin (CRESTOR) 5 mg tablet, Take 5 mg by mouth daily, Disp: , Rfl:     sacubitril-valsartan (ENTRESTO) 24-26 MG TABS, Take 1 tablet by mouth 2 (two) times a day Do not start before 2024., Disp: 60 tablet, Rfl: 0    torsemide (DEMADEX) 20 mg tablet, Take 2 tablets (40 mg total) by mouth 2 (two) times a day, Disp: 120 tablet, Rfl: 17    Social History     Socioeconomic History    Marital status: /Civil Union     Spouse name: Not on file    Number of children: Not on file    Years of education: Not on file    Highest education level: Not on file   Occupational History    Occupation: employed   Tobacco Use    Smoking status: Former     Current packs/day: 0.00     Types: Cigarettes     Quit date: 2011     Years since quittin.5    Smokeless tobacco: Never   Vaping Use    Vaping status: Never Used   Substance and Sexual Activity    Alcohol use: Yes     Comment: seldom    Drug use: Not Currently     Types: Marijuana    Sexual  "activity: Yes     Partners: Female   Other Topics Concern    Not on file   Social History Narrative    Not on file     Social Drivers of Health     Financial Resource Strain: Not on file   Food Insecurity: No Food Insecurity (2024)    Nursing - Inadequate Food Risk Classification     Worried About Running Out of Food in the Last Year: Not on file     Ran Out of Food in the Last Year: Not on file     Ran Out of Food in the Last Year: Never true   Transportation Needs: No Transportation Needs (2024)    Nursing - Transportation Risk Classification     Lack of Transportation: Not on file     Lack of Transportation: No   Physical Activity: Not on file   Stress: Not on file   Social Connections: Not on file   Intimate Partner Violence: Unknown (2024)    Nursing IPS     Feels Physically and Emotionally Safe: Not on file     Physically Hurt by Someone: Not on file     Humiliated or Emotionally Abused by Someone: Not on file     Physically Hurt by Someone: No     Hurt or Threatened by Someone: No   Housing Stability: Unknown (2024)    Nursing: Inadequate Housing Risk Classification     Has Housing: Not on file     Worried About Losing Housing: Not on file     Unable to Get Utilities: Not on file     Unable to Pay for Housing in the Last Year: No     Has Housin       Family History   Problem Relation Age of Onset    No Known Problems Mother     Diabetes Father        Physical Exam:    Vitals: Blood pressure 90/63, pulse (!) 122, temperature 99.1 °F (37.3 °C), resp. rate 18, height 5' 10\" (1.778 m), weight 94.3 kg (208 lb), SpO2 98%., Body mass index is 29.84 kg/m².,   Wt Readings from Last 3 Encounters:   25 94.3 kg (208 lb)   24 97.2 kg (214 lb 3.2 oz)   24 91.7 kg (202 lb 3.2 oz)       Physical Exam  Constitutional:       General: He is not in acute distress.     Appearance: Normal appearance.   HENT:      Head: Normocephalic and atraumatic.      Mouth/Throat:      Mouth: Mucous " "membranes are moist.   Eyes:      General: No scleral icterus.     Extraocular Movements: Extraocular movements intact.   Neck:      Vascular: JVD present.   Cardiovascular:      Rate and Rhythm: Regular rhythm. Tachycardia present.      Pulses: Normal pulses.      Heart sounds: S1 normal and S2 normal. No murmur heard.     No friction rub. No gallop.   Pulmonary:      Breath sounds: Normal breath sounds.   Abdominal:      General: There is no distension.      Palpations: Abdomen is soft.      Tenderness: There is no abdominal tenderness. There is no guarding or rebound.   Musculoskeletal:         General: Normal range of motion.      Cervical back: Neck supple.      Right lower leg: No edema.      Left lower leg: No edema.   Skin:     General: Skin is warm and dry.      Capillary Refill: Capillary refill takes less than 2 seconds.   Neurological:      General: No focal deficit present.      Mental Status: He is alert and oriented to person, place, and time.   Psychiatric:         Mood and Affect: Mood normal.         Labs & Results:    Lab Results   Component Value Date    WBC 8.56 01/05/2025    HGB 14.7 01/05/2025    HCT 45.4 01/05/2025    MCV 93 01/05/2025     01/05/2025     Lab Results   Component Value Date    SODIUM 128 (L) 01/05/2025    K 4.5 01/05/2025    CL 92 (L) 01/05/2025    CO2 25 01/05/2025    BUN 19 01/05/2025    CREATININE 1.54 (H) 01/05/2025    GLUC 140 01/05/2025    CALCIUM 9.1 01/05/2025     No results found for: \"NTBNP\"   No results found for: \"CHOLESTEROL\"  Lab Results   Component Value Date    HDL 55 10/30/2014     Lab Results   Component Value Date    TRIG 347 10/30/2014     No results found for: \"NONHDLC\"    EKG personally reviewed by Eboni Power MD.     Thank you for the opportunity to participate in the care of this patient.    EBONI POWER MD  ADVANCED HEART FAILURE AND MECHANICAL CIRCULATORY SUPPORT  [facility]     "

## 2025-01-05 NOTE — ASSESSMENT & PLAN NOTE
Pt has been taking Xanax 1 mg 4 times daily PRN up until 2 days ago when he stopped it on his own accord as an attempt to get off the medication. He has been on it for greater than 6 months  While admitted will continue with Xanax 0.5 mg 3 times daily PRN

## 2025-01-05 NOTE — ASSESSMENT & PLAN NOTE
Lab Results   Component Value Date    EGFR 52 01/05/2025    EGFR 65 01/04/2025    EGFR 64 01/03/2025    CREATININE 1.54 (H) 01/05/2025    CREATININE 1.27 01/04/2025    CREATININE 1.29 01/03/2025   Baseline creatinine 1.2-1.4. creatinine today 1.54- elevated likely in the setting of dehydration   Holding entresto and diuretics   Avoid hypotension, nephrotoxins, and NSAIDS if possible    D/w HF team, will defer need for IVFs to cardiology given cardiac history   Strict I & Os

## 2025-01-05 NOTE — ASSESSMENT & PLAN NOTE
"Presented to the ER after having episodes of coughing resulting in near syncopal episodes  Extensive cardiac history with known cardiomyopathy which is being further evaluated by cardiology outpatient *see chronic combined systolic and diastolic heart failure\"  Recently saw cardiology outpatient and endorsed increased cough for which he was given IV Lasix 100 mg in the clinic as well as had his diuretics switched to torsemide 40 mg twice daily from Lasix 80 in the morning and 40 in the p.m.  Patient reports that his lower extremity edema has improved although he does endorse continued episodes of coughing  EKG sinus tachycardia, right bundle branch block (seen on prior EKG), QTc 41  Trop 31-->33-->37  Denies any current chest pain  Chest x-ray with no acute cardiopulmonary process  Reports feeling dehydrated on my exam as well as noted glucose 77  Patient additionally reports that he is on chronic Xanax 1 mg 4 times daily as needed which he reports that he stopped taking on his own 2 days ago as he reports that he \"wanted to try and get off of it\"; has been on this medication for greater than at least 6 months  In the ER room he reports feeling anxious and requesting dosage but lower amount  Admit to medicine on telemetry given near syncope and patient's extensive underlying cardiac history.  Monitor for arrhythmias.  Patient's symptoms of near syncope may be vasovagal in the setting of persistent coughing as well as reportedly feeling dehydrated.  With that said he does have extensive cardiac history as well as recent outpatient cardiac MRI showing EF 8% and work up into the etiology of this is currently ongoing with cardiology outpatient.  As such, we will consult CV team on case further assistance  Hold patient's diuretics as well as his Entresto overnight; we will continue his beta-blocker  "

## 2025-01-05 NOTE — ASSESSMENT & PLAN NOTE
"Wt Readings from Last 3 Encounters:   01/04/25 94.3 kg (208 lb)   12/31/24 97.2 kg (214 lb 3.2 oz)   12/25/24 91.7 kg (202 lb 3.2 oz)     Reviewed most recent CV outpatient note with Grace on 12/31  Most recent echo with LVEF 15% with BiV failure, nondilated LV-->new dx in October 2024  Inferior Infarct by NST/PVCs  Was on Lasix 80 in AM and 40 PM prior to visit  Appeared volume up at CV appointment per notes and was given IV Lasix 100mg in the clinic and then had Lasix Dc'd and switched to torsemide 40mg bid; cardiology additionally noted cardiac MRI that was ordered to further evaluate reduced LVEF  Underwent Cardiac MRI on 1/2: \"Moderately dilated left ventricle with severely reduced systolic function, EF 8%. Severe global hypokinesis with regional variation. Mildly dilated right ventricle with severely reduced systolic function, EF 12%. Moderately dilated bilateral atria. Tricuspid regurgitation visualized. Possible focal transmural scarring of the apical inferior wall, which could represent a small distal infarct. Focal late gadolinium enhancement at the inferior RV insertion point, nonspecific, but can be seen with idiopathic dilated cardiomyopathy.\"  Appears there was plans for further discussion regarding MRI results on 1/9 with cardiology outpatient  In terms of patient's other medications he is additionally on Toprol 25 mg twice daily as well as Entresto 24-26 twice daily and there was consideration for Aldactone in the future  Patient additionally has a LifeVest   In setting of syncope with coughing episodes as well as patient reporting feeling \"dehydrated\" holding PTA diuretics overnight as well as patient's Entresto to avoid provoking hypotension; we will continue his Toprol  Heart failure team consultation placed for further assistance        "

## 2025-01-05 NOTE — ED ATTENDING ATTESTATION
1/4/2025  I, Constantine Fox MD, saw and evaluated the patient. I have discussed the patient with the resident/non-physician practitioner and agree with the resident's/non-physician practitioner's findings, Plan of Care, and MDM as documented in the resident's/non-physician practitioner's note, except where noted. All available labs and Radiology studies were reviewed.  I was present for key portions of any procedure(s) performed by the resident/non-physician practitioner and I was immediately available to provide assistance.       At this point I agree with the current assessment done in the Emergency Department.  I have conducted an independent evaluation of this patient a history and physical is as follows:    49-year-old man with history of CHF with EF of 15%, with LifeVest, presenting with URI symptoms and near syncope.  Patient states that today whenever he coughs he feels like he is going to pass out and states he has nearly passed out multiple times.  Is endorsing some chest discomfort.  Occasional shortness of breath.  On exam patient awake and alert no acute distress.  LifeVest in place.  Heart regular rate and rhythm, no murmurs rubs gallop.  Lungs clear to auscultation bilaterally.  Abdomen soft, nontender, nondistended.  Skin warm and dry.  No extremity swelling or edema.  Vitals reviewed from most recent outpatient visit heart rate and blood pressure do appear to be around usual.  Patient had a coughing fit while here and became very lightheaded.  Given his recent cardiac diagnosis and poor EF with near syncopal symptoms, even just in the setting of coughing, still will admit him for further monitoring.    ED Course         Critical Care Time  Procedures

## 2025-01-05 NOTE — ASSESSMENT & PLAN NOTE
"Presented to the ER after having episodes of coughing resulting in near syncopal episodes  Extensive cardiac history with known cardiomyopathy which is being further evaluated by cardiology outpatient- see CHF plan below  Recently saw cardiology outpatient d/t increased cough for which he was given IV Lasix 100 mg in the clinic as well as his diuretics switched to torsemide 40 mg twice daily from Lasix 80 in the morning and 40 in the p.m.  Patient reports that his lower extremity edema has improved although he does endorse continued episodes of coughing  recent outpatient cardiac MRI showing EF 8% and work up into the etiology of this is currently ongoing with cardiology outpatient.  EKG sinus tachycardia, right bundle branch block (seen on prior EKG), QTc 41  Trop 31-->33-->37  Denied any chest pain  Chest x-ray 1/4- no acute cardiopulmonary process  Pt reported feeling dehydrated on admission   Patient additionally reported that he is on chronic Xanax 1 mg 4 times daily as needed which he stopped taking on his own accord 2 days ago because he \"wanted to try and get off of it\"; has been on this medication for greater than 6 months  Consult cardiology   Monitor on tele for possible arrhythmias.    Hold diuretics as well as his Entresto. continue his beta-blocker  Check f/u/RSV/COVID  "

## 2025-01-05 NOTE — H&P
"H&P - Hospitalist   Name: Martell Mejía 49 y.o. male I MRN: [id number]  Unit/Bed#: ED 27 I Date of Admission: 1/4/2025   Date of Service: 1/4/2025 I Hospital Day: 0     Assessment & Plan  Near syncope  Presented to the ER after having episodes of coughing resulting in near syncopal episodes  Extensive cardiac history with known cardiomyopathy which is being further evaluated by cardiology outpatient *see chronic combined systolic and diastolic heart failure\"  Recently saw cardiology outpatient and endorsed increased cough for which he was given IV Lasix 100 mg in the clinic as well as had his diuretics switched to torsemide 40 mg twice daily from Lasix 80 in the morning and 40 in the p.m.  Patient reports that his lower extremity edema has improved although he does endorse continued episodes of coughing  EKG sinus tachycardia, right bundle branch block (seen on prior EKG), QTc 41  Trop 31-->33-->37  Denies any current chest pain  Chest x-ray with no acute cardiopulmonary process  Reports feeling dehydrated on my exam as well as noted glucose 77  Patient additionally reports that he is on chronic Xanax 1 mg 4 times daily as needed which he reports that he stopped taking on his own 2 days ago as he reports that he \"wanted to try and get off of it\"; has been on this medication for greater than at least 6 months  In the ER room he reports feeling anxious and requesting dosage but lower amount  Admit to medicine on telemetry given near syncope and patient's extensive underlying cardiac history.  Monitor for arrhythmias.  Patient's symptoms of near syncope may be vasovagal in the setting of persistent coughing as well as reportedly feeling dehydrated.  With that said he does have extensive cardiac history as well as recent outpatient cardiac MRI showing EF 8% and work up into the etiology of this is currently ongoing with cardiology outpatient.  As such, we will consult CV team on case further assistance  Hold patient's " "diuretics as well as his Entresto overnight; we will continue his beta-blocker  Anxiety  Reports that he been taking Xanax 1 mg 4 times daily as needed up until 2 days ago when he reports that he stopped taking his Xanax due to \"wanting to try and get off of it\"; has been on this medication for greater than at least 6 months  In the ER room he reports feeling anxious and requesting dosage but lower amount  We will put on Xanax 0.5 mg 3 times daily as needed given that we do not want to stop this medication abruptly given seizure risk as well as to also work with patient's preferences  Chronic combined systolic and diastolic heart failure (HCC)  Wt Readings from Last 3 Encounters:   01/04/25 94.3 kg (208 lb)   12/31/24 97.2 kg (214 lb 3.2 oz)   12/25/24 91.7 kg (202 lb 3.2 oz)     Reviewed most recent CV outpatient note with Grace on 12/31  Most recent echo with LVEF 15% with BiV failure, nondilated LV-->new dx in October 2024  Inferior Infarct by NST/PVCs  Was on Lasix 80 in AM and 40 PM prior to visit  Appeared volume up at CV appointment per notes and was given IV Lasix 100mg in the clinic and then had Lasix Dc'd and switched to torsemide 40mg bid; cardiology additionally noted cardiac MRI that was ordered to further evaluate reduced LVEF  Underwent Cardiac MRI on 1/2: \"Moderately dilated left ventricle with severely reduced systolic function, EF 8%. Severe global hypokinesis with regional variation. Mildly dilated right ventricle with severely reduced systolic function, EF 12%. Moderately dilated bilateral atria. Tricuspid regurgitation visualized. Possible focal transmural scarring of the apical inferior wall, which could represent a small distal infarct. Focal late gadolinium enhancement at the inferior RV insertion point, nonspecific, but can be seen with idiopathic dilated cardiomyopathy.\"  Appears there was plans for further discussion regarding MRI results on 1/9 with cardiology outpatient  In terms of " "patient's other medications he is additionally on Toprol 25 mg twice daily as well as Entresto 24-26 twice daily and there was consideration for Aldactone in the future  Patient additionally has a LifeVest   In setting of syncope with coughing episodes as well as patient reporting feeling \"dehydrated\" holding PTA diuretics overnight as well as patient's Entresto to avoid provoking hypotension; we will continue his Toprol  Heart failure team consultation placed for further assistance        Tetrahydrocannabinol (THC) use disorder, mild, abuse  Patient does report using THC and appears he has been informed of need to refrain from THC use      VTE Pharmacologic Prophylaxis: SC heparin   Code Status: Level 1 - Full Code       Anticipated Length of Stay: Patient will be admitted on an observation basis with an anticipated length of stay of less than 2 midnights secondary to near syncope.    History of Present Illness   Chief Complaint: Near syncope    Martell Mejía is a 49 y.o. male with a PMH of cardiomyopathy of unclear etiology currently being worked up by CV outpatient, anxiety on PTA Xanax, THC use who presents with several episodes of near syncope.  Patient reports persistent coughing over the last several days and that he has had episodes in which he feels like he is going to pass out during the coughing episodes.  Patient additionally reports feeling dehydrated.  Patient denies any current chest pain.    Review of Systems   Constitutional:  Positive for fatigue.   HENT:  Negative for ear pain.    Respiratory:  Positive for cough.    Cardiovascular:  Negative for chest pain.   Neurological:         Near syncope     Psychiatric/Behavioral:  The patient is nervous/anxious.    All other systems reviewed and are negative.      Historical Information   Past Medical History:   Diagnosis Date    Allergic     Anxiety     Depression     GERD (gastroesophageal reflux disease)     Hyperlipidemia     Hypertension      Past " Surgical History:   Procedure Laterality Date    NO PAST SURGERIES       Social History     Tobacco Use    Smoking status: Former     Current packs/day: 0.00     Types: Cigarettes     Quit date: 2011     Years since quittin.5    Smokeless tobacco: Never   Vaping Use    Vaping status: Never Used   Substance and Sexual Activity    Alcohol use: Yes     Comment: seldom    Drug use: Not Currently     Types: Marijuana    Sexual activity: Yes     Partners: Female     E-Cigarette/Vaping    E-Cigarette Use Never User      E-Cigarette/Vaping Substances    Nicotine No     THC No     CBD No     Flavoring No     Other No     Unknown No      Family history non-contributory  Social History:  Marital Status: /Civil Union       Meds/Allergies   I have reviewed home medications using recent Epic encounter.  Prior to Admission medications    Medication Sig Start Date End Date Taking? Authorizing Provider   albuterol (Ventolin HFA) 90 mcg/act inhaler Inhale 2 puffs 4 (four) times a day 10/16/24   Radames Grajeda DO   ALPRAZolam (XANAX) 1 mg tablet Take 1 tablet (1 mg total) by mouth daily take 1 tablet by mouth four times a day for anxiety 24   Radames Grajeda DO   aspirin 81 mg chewable tablet Chew 81 mg daily    Historical Provider, MD   Empagliflozin (JARDIANCE) 10 MG TABS tablet Take 1 tablet (10 mg total) by mouth every morning Do not start before 2024. 24   Charlette Snowden MD   metoprolol succinate (TOPROL-XL) 25 mg 24 hr tablet Take 1 tablet (25 mg total) by mouth 2 (two) times a day 24   Charlette Snowden MD   omeprazole (PriLOSEC) 40 MG capsule Take 1 capsule (40 mg total) by mouth daily 24   Radames Grajeda DO   potassium chloride (Klor-Con M20) 20 mEq tablet Take 1 tablet (20 mEq total) by mouth daily 24  Jian Edwards MD   rosuvastatin (CRESTOR) 5 mg tablet Take 5 mg by mouth daily    Historical Provider, MD   sacubitril-valsartan (ENTRESTO) 24-26 MG TABS Take 1  tablet by mouth 2 (two) times a day Do not start before December 26, 2024. 12/26/24   Charlette Snowden MD   torsemide (DEMADEX) 20 mg tablet Take 2 tablets (40 mg total) by mouth 2 (two) times a day 12/31/24 6/24/26  Jian Edwards MD     No Known Allergies    Objective :  Temp:  [98.4 °F (36.9 °C)-99.2 °F (37.3 °C)] 99.1 °F (37.3 °C)  HR:  [102-108] 104  BP: ()/(60-75) 118/75  Resp:  [18-20] 19  SpO2:  [97 %-100 %] 97 %  O2 Device: None (Room air)    Physical Exam  Vitals reviewed.   Constitutional:       Appearance: He is not toxic-appearing.   HENT:      Right Ear: External ear normal.      Left Ear: External ear normal.      Nose: No congestion.      Mouth/Throat:      Pharynx: Oropharynx is clear.   Eyes:      Extraocular Movements: Extraocular movements intact.      Pupils: Pupils are equal, round, and reactive to light.   Cardiovascular:      Rate and Rhythm: Tachycardia present.   Pulmonary:      Effort: Pulmonary effort is normal. No respiratory distress.   Abdominal:      General: There is no distension.   Musculoskeletal:      Right lower leg: No edema.      Left lower leg: No edema.   Skin:     General: Skin is warm and dry.      Capillary Refill: Capillary refill takes less than 2 seconds.   Neurological:      General: No focal deficit present.      Mental Status: He is alert. Mental status is at baseline.   Psychiatric:      Comments: Anxious              Lab Results: I have reviewed the following results:  Results from last 7 days   Lab Units 01/04/25  1208   WBC Thousand/uL 9.89   HEMOGLOBIN g/dL 14.9   HEMATOCRIT % 46.6   PLATELETS Thousands/uL 244   SEGS PCT % 88*   LYMPHO PCT % 8*   MONO PCT % 4   EOS PCT % 0     Results from last 7 days   Lab Units 01/04/25  1208   SODIUM mmol/L 133*   POTASSIUM mmol/L 3.7   CHLORIDE mmol/L 93*   CO2 mmol/L 29   BUN mg/dL 20   CREATININE mg/dL 1.27   ANION GAP mmol/L 11   CALCIUM mg/dL 9.5   ALBUMIN g/dL 4.1   TOTAL BILIRUBIN mg/dL 1.17*   ALK PHOS U/L 87    ALT U/L 28   AST U/L 15   GLUCOSE RANDOM mg/dL 77             Lab Results   Component Value Date    HGBA1C 6.5 (H) 12/20/2024           Imaging Results Review: I reviewed radiology reports from this admission including: chest xray.  Other Study Results Review: EKG was reviewed.     Administrative Statements   I have spent a total time of 75 minutes in caring for this patient on the day of the visit/encounter including Diagnostic results, Prognosis, and Risks and benefits of tx options.    ** Please Note: This note has been constructed using a voice recognition system. **

## 2025-01-05 NOTE — UTILIZATION REVIEW
Initial Clinical Review    Pt initially admitted as Observation on 1/4 converted to Inpatient on 1/6.  Pt requiring continued stay due to Near Syncope.     Admission: Date/Time/Statement:   Admission Orders (From admission, onward)       Ordered        01/06/25 1402  INPATIENT ADMISSION  Once            01/04/25 2010  Place in Observation  Once                          Orders Placed This Encounter   Procedures    INPATIENT ADMISSION     Standing Status:   Standing     Number of Occurrences:   1     Level of Care:   Med Surg [16]     Estimated length of stay:   More than 2 Midnights     Certification:   I certify that inpatient services are medically necessary for this patient for a duration of greater than two midnights. See H&P and MD Progress Notes for additional information about the patient's course of treatment.     ED Arrival Information       Expected   1/4/2025     Arrival   1/4/2025 11:57    Acuity   Emergent              Means of arrival   Walk-In    Escorted by   Family Member    Service   Hospitalist    Admission type   Emergency              Arrival complaint   Chest Pain; SOB             Chief Complaint   Patient presents with    Dizziness     Patient reports coughing throughout the night and this morning sitting up to cough and started to see black but did not lose consciousness. Patient presents to ED wearing life vest which he states he has had for the past month. Patient denies SOB and CP.       Initial Presentation: 49 y.o. male with PMHx: cardiomyopathy of unclear etiology currently being worked up by CV outpatient, anxiety on PTA Xanax, THC use, who presented on 1/4/25 to ED initially admitted Observation status then converted to Inpatient due to Near Syncope.  Presented due to several episodes of near syncope.  Patient reports persistent coughing over the last several days and that he has had episodes in which he feels like he is going to pass out during the coughing episodes.  Patient  additionally reports feeling dehydrated. Recent outpatient cardiac MRI showing EF 8% and work up into the etiology of this is currently ongoing with cardiology outpatient. Pt has a LifeVest. Labs revealed Na 133, BNP 3563, rop 31--32--37. Influenza A positive. EKG was ST, right atrial enlargement, RBBB. In the ED, given  ml bolus, neb tx, PO Ativan x1, PO Xanax x2, PO toprol xl.     1/4/2025 Admit to Observation.  Plan:   med surg, telemetry, Heart Failure consult, hold diuretic and Entresto, continue BB. Continue Xanax. 2gm Na diet, accuchecks. IO, daily wts.     1/5 Per Heart Failure: volume overload, elevated JVP, continue IV Lasix, reduce metoprolol dose, hold Jaridance, continue Tamiflu.    1/6/2025 Inpatient Admission:   Pt tachycardic presently, murmur heard. Skin is pale. Ongoing cough which she reports is improved with ability to expectorate. No longer seeing stars when he coughs. Some occasional shortness of breath but currently on room air. Heart Failure team following, continue telemetry, monitor VS, labs, accuchecks. Repeat labs in AM.     ED Treatment-Medication Administration from 01/04/2025 1157 to 01/05/2025 0816         Date/Time Order Dose Route Action     01/04/2025 1618 albuterol inhalation solution 5 mg 5 mg Nebulization Given     01/04/2025 1618 ipratropium (ATROVENT) 0.02 % inhalation solution 0.5 mg 0.5 mg Nebulization Given     01/05/2025 0502 pantoprazole (PROTONIX) EC tablet 40 mg 40 mg Oral Given     01/04/2025 2038 LORazepam (ATIVAN) tablet 0.5 mg 0.5 mg Oral Given     01/04/2025 2038 metoprolol succinate (TOPROL-XL) 24 hr tablet 25 mg 25 mg Oral Given     01/04/2025 2044 multi-electrolyte (ISOLYTE-S PH 7.4) bolus 250 mL 250 mL Intravenous New Bag     01/04/2025 2044 magnesium sulfate IVPB (premix) SOLN 1 g 1 g Intravenous New Bag     01/04/2025 2038 potassium chloride (Klor-Con M20) CR tablet 40 mEq 40 mEq Oral Given     01/04/2025 2053 ALPRAZolam (XANAX) tablet 0.5 mg 0.5 mg  Oral Given     01/05/2025 0521 ALPRAZolam (XANAX) tablet 1 mg 1 mg Oral Given     01/05/2025 0739 levalbuterol (XOPENEX) inhalation solution 1.25 mg 1.25 mg Nebulization Given     01/05/2025 0739 ipratropium (ATROVENT) 0.02 % inhalation solution 0.5 mg 0.5 mg Nebulization Given            Scheduled Medications:  aspirin, 81 mg, Oral, Daily  heparin (porcine), 5,000 Units, Subcutaneous, Q8H ALEJANDRO  ipratropium, 0.5 mg, Nebulization, TID  levalbuterol, 1.25 mg, Nebulization, TID  metoprolol succinate, 12.5 mg, Oral, BID  oseltamivir, 75 mg, Oral, Q12H ALEJANDRO  pantoprazole, 40 mg, Oral, Early Morning  potassium chloride, 20 mEq, Oral, Daily  pravastatin, 40 mg, Oral, Daily With Dinner      Continuous IV Infusions: none     PRN Meds:  acetaminophen, 650 mg, Oral, Q6H PRN  albuterol, 2 puff, Inhalation, Q4H PRN x1  ALPRAZolam, 1 mg, Oral, TID PRN x2      ED Triage Vitals [01/04/25 1203]   Temperature Pulse Respirations Blood Pressure SpO2 Pain Score   98.4 °F (36.9 °C) 104 20 115/72 100 % 4     Weight (last 2 days)       Date/Time Weight    01/06/25 0501 91.6 (201.94)    01/05/25 1454 92.8 (204.6)    01/05/25 1100 93 (205.03)    01/04/25 1203 94.3 (208)            Vital Signs (last 3 days)       Date/Time Temp Pulse Resp BP MAP (mmHg) SpO2 O2 Device Patient Position - Orthostatic VS Francisco Coma Scale Score Pain    01/06/25 10:54:36 98.2 °F (36.8 °C) 104 14 92/66 75 97 % None (Room air) Sitting -- --    01/06/25 0815 -- -- -- -- -- -- None (Room air) -- -- No Pain    01/06/25 0700 98.2 °F (36.8 °C) 115 18 101/68 79 94 % None (Room air) Sitting -- --    01/05/25 2126 -- -- -- -- -- -- -- -- 15 No Pain    01/05/25 21:16:56 -- 114 -- 106/66 79 93 % -- -- -- --    01/05/25 1947 -- -- -- -- -- 94 % None (Room air) -- -- --    01/05/25 19:14:19 98.3 °F (36.8 °C) 126 17 101/68 79 97 % -- -- -- --    01/05/25 1626 -- -- -- -- -- 97 % None (Room air) -- -- --    01/05/25 15:28:46 98.2 °F (36.8 °C) 129 16 113/78 90 97 % None (Room air)  Lying -- --    01/05/25 1510 -- -- -- -- -- 99 % None (Room air) -- -- --    01/05/25 1454 -- 120 20 96/70 -- -- -- Sitting -- --    01/05/25 1439 -- -- -- -- -- -- None (Room air) -- -- No Pain    01/05/25 1330 -- 122 -- 93/70 -- -- -- -- -- --    01/05/25 1106 -- 122 -- 90/63 73 98 % None (Room air) Sitting -- --    01/05/25 0700 -- 124 -- 109/76 88 98 % -- -- -- --    01/05/25 0600 -- 120 18 115/80 90 98 % -- -- -- No Pain    01/05/25 0500 -- 132 19 128/83 96 100 % None (Room air) -- -- No Pain    01/05/25 0400 -- 128 18 116/86 98 98 % -- -- -- --    01/04/25 2045 -- 104 -- 118/75 89 97 % -- -- 15 --    01/04/25 1915 -- 104 19 106/61 74 97 % -- -- -- --    01/04/25 1800 99.1 °F (37.3 °C) 108 20 113/71 86 99 % -- -- -- --    01/04/25 1700 -- 106 20 108/70 84 100 % -- -- -- 4    01/04/25 1610 -- -- -- -- -- -- -- -- 15 --    01/04/25 1609 -- -- -- -- -- -- None (Room air) -- -- --    01/04/25 1455 99.2 °F (37.3 °C) 102 20 97/60 74 97 % None (Room air) Sitting -- --    01/04/25 1258 -- 107 18 94/68 -- 100 % None (Room air) Sitting -- --    01/04/25 1203 98.4 °F (36.9 °C) 104 20 115/72 89 100 % None (Room air) Sitting -- 4              Pertinent Labs/Diagnostic Test Results:   Radiology:  XR chest 1 view portable   Final Interpretation by Brad Kenney MD (01/04 4077)      No acute cardiopulmonary disease.            Workstation performed: [id number]           Cardiology:  ECG 12 lead   Final Result by Mono Bennett MD (01/06 0770)     Age and gender specific ECG analysis     Sinus tachycardia with occasional Premature ventricular complexes   Right atrial enlargement   Right bundle branch block   Possible Inferior infarct , age undetermined   Cannot rule out Anteroseptal infarct Abnormal ECG   When compared with ECG of 04-Jan-2025 12:00, (unconfirmed)   Premature ventricular complexes are now Present   Confirmed by Mono Bennett ([id number]) on 1/6/2025 1:34:32 PM      ECG 12 lead   Final Result by  Adan Oro MD (01/05 1712)   Sinus tachycardia   Right atrial enlargement   Right bundle branch block   Left posterior fascicular block     Bifascicular block     Cannot rule out Anterior infarct (cited on or before 18-Dec-2024)   Abnormal ECG   When compared with ECG of 18-Dec-2024 17:43,   Criteria for Inferior infarct are no longer Present   Nonspecific T wave abnormality now evident in Inferior leads   QT has lengthened   Confirmed by Adan Oro ([id number]) on 1/5/2025 5:12:51 PM        GI:  No orders to display       Results from last 7 days   Lab Units 01/05/25  1121   SARS-COV-2  Negative     Results from last 7 days   Lab Units 01/06/25  0513 01/05/25  0507 01/04/25  1208   WBC Thousand/uL 6.00 8.56 9.89   HEMOGLOBIN g/dL 13.8 14.7 14.9   HEMATOCRIT % 42.3 45.4 46.6   PLATELETS Thousands/uL 192 221 244   TOTAL NEUT ABS Thousands/µL  --   --  8.61*         Results from last 7 days   Lab Units 01/06/25  0513 01/05/25  0507 01/04/25  1208 01/03/25  0913   SODIUM mmol/L 125* 128* 133* 138   POTASSIUM mmol/L 4.1 4.5 3.7 4.4   CHLORIDE mmol/L 90* 92* 93* 96   CO2 mmol/L 26 25 29 30   ANION GAP mmol/L 9 11 11 12   BUN mg/dL 26* 19 20 23   CREATININE mg/dL 1.56* 1.54* 1.27 1.29   EGFR ml/min/1.73sq m 51 52 65 64   CALCIUM mg/dL 9.0 9.1 9.5 10.0   MAGNESIUM mg/dL 2.1 1.9  --   --      Results from last 7 days   Lab Units 01/06/25  0513 01/04/25  1208 01/03/25  0913   AST U/L 51* 15 21   ALT U/L 43 28 35   ALK PHOS U/L 69 87 86   TOTAL PROTEIN g/dL 5.8* 6.4 6.8   ALBUMIN g/dL 3.6 4.1 4.2   TOTAL BILIRUBIN mg/dL 1.28* 1.17* 1.14*     Results from last 7 days   Lab Units 01/05/25  1602   POC GLUCOSE mg/dl 119     Results from last 7 days   Lab Units 01/06/25  0513 01/05/25  0507 01/04/25  1208   GLUCOSE RANDOM mg/dL 100 140 77     Results from last 7 days   Lab Units 01/04/25  1920 01/04/25  1458 01/04/25  1208   HS TNI 0HR ng/L  --   --  31   HS TNI 2HR ng/L  --  32  --    HSTNI D2 ng/L  --  1  --    HS TNI  4HR ng/L 37  --   --    HSTNI D4 ng/L 6  --   --      Results from last 7 days   Lab Units 01/05/25  0507   BNP pg/mL 3,563*     Results from last 7 days   Lab Units 01/05/25  1121   INFLUENZA A PCR  Positive*   INFLUENZA B PCR  Negative   RSV PCR  Negative     Past Medical History:   Diagnosis Date    Allergic     Anxiety     Depression     GERD (gastroesophageal reflux disease)     Hyperlipidemia     Hypertension      Present on Admission:   Near syncope   Anxiety   Acute renal failure superimposed on stage 3a chronic kidney disease (HCC)   Hyponatremia      Admitting Diagnosis: Chronic combined systolic and diastolic heart failure (HCC) [I50.42]  Dizziness [R42]  Near syncope [R55]  Age/Sex: 49 y.o. male    Network Utilization Review Department  ATTENTION: Please call with any questions or concerns to [phone] and carefully listen to the prompts so that you are directed to the right person. All voicemails are confidential.   For Discharge needs, contact Care Management DC Support Team at [phone] opt. [phone]  Send all requests for admission clinical reviews, approved or denied determinations and any other requests to dedicated fax number below belonging to the campus where the patient is receiving treatment. List of dedicated fax numbers for the Facilities:  FACILITY NAME UR FAX NUMBER   ADMISSION DENIALS (Administrative/Medical Necessity) [phone]   DISCHARGE SUPPORT TEAM (NETWORK) [phone]   PARENT CHILD HEALTH (Maternity/NICU/Pediatrics) [phone]   [facility] [phone]   [facility] [phone]   [facility] [phone]   [facility] [phone]   [facility] [phone]   [facility] [phone]   [facility] [phone]   [facility] [phone]   [facility]  [facility] [phone]   [facility] [phone]   [facility] [phone]   [facility] [phone]

## 2025-01-05 NOTE — ASSESSMENT & PLAN NOTE
"Reports that he been taking Xanax 1 mg 4 times daily as needed up until 2 days ago when he reports that he stopped taking his Xanax due to \"wanting to try and get off of it\"; has been on this medication for greater than at least 6 months  In the ER room he reports feeling anxious and requesting dosage but lower amount  We will put on Xanax 0.5 mg 3 times daily as needed given that we do not want to stop this medication abruptly given seizure risk as well as to also work with patient's preferences  "

## 2025-01-06 LAB
ALBUMIN SERPL BCG-MCNC: 3.6 G/DL (ref 3.5–5)
ALP SERPL-CCNC: 69 U/L (ref 34–104)
ALT SERPL W P-5'-P-CCNC: 43 U/L (ref 7–52)
ANION GAP SERPL CALCULATED.3IONS-SCNC: 9 MMOL/L (ref 4–13)
AST SERPL W P-5'-P-CCNC: 51 U/L (ref 13–39)
ATRIAL RATE: 126 BPM
BILIRUB SERPL-MCNC: 1.28 MG/DL (ref 0.2–1)
BUN SERPL-MCNC: 26 MG/DL (ref 5–25)
CALCIUM SERPL-MCNC: 9 MG/DL (ref 8.4–10.2)
CHLORIDE SERPL-SCNC: 90 MMOL/L (ref 96–108)
CO2 SERPL-SCNC: 26 MMOL/L (ref 21–32)
CREAT SERPL-MCNC: 1.56 MG/DL (ref 0.6–1.3)
ERYTHROCYTE [DISTWIDTH] IN BLOOD BY AUTOMATED COUNT: 14.7 % (ref 11.6–15.1)
GFR SERPL CREATININE-BSD FRML MDRD: 51 ML/MIN/1.73SQ M
GLUCOSE SERPL-MCNC: 100 MG/DL (ref 65–140)
HCT VFR BLD AUTO: 42.3 % (ref 36.5–49.3)
HGB BLD-MCNC: 13.8 G/DL (ref 12–17)
MAGNESIUM SERPL-MCNC: 2.1 MG/DL (ref 1.9–2.7)
MCH RBC QN AUTO: 30.5 PG (ref 26.8–34.3)
MCHC RBC AUTO-ENTMCNC: 32.6 G/DL (ref 31.4–37.4)
MCV RBC AUTO: 93 FL (ref 82–98)
P AXIS: 75 DEGREES
PLATELET # BLD AUTO: 192 THOUSANDS/UL (ref 149–390)
PMV BLD AUTO: 10.3 FL (ref 8.9–12.7)
POTASSIUM SERPL-SCNC: 4.1 MMOL/L (ref 3.5–5.3)
PR INTERVAL: 176 MS
PROT SERPL-MCNC: 5.8 G/DL (ref 6.4–8.4)
QRS AXIS: 210 DEGREES
QRSD INTERVAL: 110 MS
QT INTERVAL: 294 MS
QTC INTERVAL: 425 MS
RBC # BLD AUTO: 4.53 MILLION/UL (ref 3.88–5.62)
SODIUM SERPL-SCNC: 125 MMOL/L (ref 135–147)
T WAVE AXIS: 81 DEGREES
VENTRICULAR RATE: 126 BPM
WBC # BLD AUTO: 6 THOUSAND/UL (ref 4.31–10.16)

## 2025-01-06 PROCEDURE — 94760 N-INVAS EAR/PLS OXIMETRY 1: CPT

## 2025-01-06 PROCEDURE — 94640 AIRWAY INHALATION TREATMENT: CPT

## 2025-01-06 PROCEDURE — NC001 PR NO CHARGE: Performed by: INTERNAL MEDICINE

## 2025-01-06 PROCEDURE — 99232 SBSQ HOSP IP/OBS MODERATE 35: CPT | Performed by: NURSE PRACTITIONER

## 2025-01-06 PROCEDURE — 80053 COMPREHEN METABOLIC PANEL: CPT | Performed by: NURSE PRACTITIONER

## 2025-01-06 PROCEDURE — 94664 DEMO&/EVAL PT USE INHALER: CPT

## 2025-01-06 PROCEDURE — 85027 COMPLETE CBC AUTOMATED: CPT | Performed by: NURSE PRACTITIONER

## 2025-01-06 PROCEDURE — 99232 SBSQ HOSP IP/OBS MODERATE 35: CPT | Performed by: INTERNAL MEDICINE

## 2025-01-06 PROCEDURE — 83735 ASSAY OF MAGNESIUM: CPT | Performed by: NURSE PRACTITIONER

## 2025-01-06 PROCEDURE — 93010 ELECTROCARDIOGRAM REPORT: CPT | Performed by: INTERNAL MEDICINE

## 2025-01-06 RX ADMIN — POTASSIUM CHLORIDE 20 MEQ: 1500 TABLET, EXTENDED RELEASE ORAL at 08:17

## 2025-01-06 RX ADMIN — METOPROLOL SUCCINATE 12.5 MG: 25 TABLET, EXTENDED RELEASE ORAL at 17:19

## 2025-01-06 RX ADMIN — ALBUTEROL SULFATE 2 PUFF: 90 AEROSOL, METERED RESPIRATORY (INHALATION) at 03:14

## 2025-01-06 RX ADMIN — IPRATROPIUM BROMIDE 0.5 MG: 0.5 SOLUTION RESPIRATORY (INHALATION) at 15:14

## 2025-01-06 RX ADMIN — FUROSEMIDE 40 MG: 10 INJECTION, SOLUTION INTRAMUSCULAR; INTRAVENOUS at 08:17

## 2025-01-06 RX ADMIN — IPRATROPIUM BROMIDE 0.5 MG: 0.5 SOLUTION RESPIRATORY (INHALATION) at 19:47

## 2025-01-06 RX ADMIN — OSELTAMIVIR PHOSPHATE 75 MG: 75 CAPSULE ORAL at 05:05

## 2025-01-06 RX ADMIN — METOPROLOL SUCCINATE 12.5 MG: 25 TABLET, EXTENDED RELEASE ORAL at 08:16

## 2025-01-06 RX ADMIN — PRAVASTATIN SODIUM 40 MG: 40 TABLET ORAL at 16:18

## 2025-01-06 RX ADMIN — LEVALBUTEROL HYDROCHLORIDE 1.25 MG: 1.25 SOLUTION RESPIRATORY (INHALATION) at 15:14

## 2025-01-06 RX ADMIN — ALPRAZOLAM 1 MG: 0.5 TABLET ORAL at 20:41

## 2025-01-06 RX ADMIN — LEVALBUTEROL HYDROCHLORIDE 1.25 MG: 1.25 SOLUTION RESPIRATORY (INHALATION) at 07:53

## 2025-01-06 RX ADMIN — PANTOPRAZOLE SODIUM 40 MG: 40 TABLET, DELAYED RELEASE ORAL at 05:05

## 2025-01-06 RX ADMIN — IPRATROPIUM BROMIDE 0.5 MG: 0.5 SOLUTION RESPIRATORY (INHALATION) at 07:53

## 2025-01-06 RX ADMIN — LEVALBUTEROL HYDROCHLORIDE 1.25 MG: 1.25 SOLUTION RESPIRATORY (INHALATION) at 19:47

## 2025-01-06 RX ADMIN — HEPARIN SODIUM 5000 UNITS: 5000 INJECTION, SOLUTION INTRAVENOUS; SUBCUTANEOUS at 05:04

## 2025-01-06 RX ADMIN — OSELTAMIVIR PHOSPHATE 75 MG: 75 CAPSULE ORAL at 16:18

## 2025-01-06 RX ADMIN — ASPIRIN 81 MG CHEWABLE TABLET 81 MG: 81 TABLET CHEWABLE at 08:16

## 2025-01-06 RX ADMIN — HEPARIN SODIUM 5000 UNITS: 5000 INJECTION, SOLUTION INTRAVENOUS; SUBCUTANEOUS at 21:00

## 2025-01-06 NOTE — UTILIZATION REVIEW
NOTIFICATION OF INPATIENT ADMISSION   AUTHORIZATION REQUEST   SERVICING FACILITY:   [facility], [facility]  Address: [address] [place] [address]  Tax ID: [id number]  NPI: [id number] ATTENDING PROVIDER:  Attending Name and NPI#: Betsey Garsia Md [[id number]]  Address: [address] [place] [address]  Phone: [phone]   ADMISSION INFORMATION:  Place of Service: Inpatient [facility]  Place of Service Code: 21  Inpatient Admission Date/Time: 1/6/25  2:03 PM  Discharge Date/Time: No discharge date for patient encounter.  Admitting Diagnosis Code/Description:  Chronic combined systolic and diastolic heart failure (HCC) [I50.42]  Dizziness [R42]  Near syncope [R55]     UTILIZATION REVIEW CONTACT:  Howie Segovia Utilization   Network Utilization Review Department  Phone: [phone]  Fax: [phone]  Email: [email or web address]  Contact for approvals/pending authorizations, clinical reviews, and discharge.     PHYSICIAN ADVISORY SERVICES:  Medical Necessity Denial & Jlbg-wy-Hntk Review  Phone: [phone]  Fax: [phone]  Email: [email or web address]     DISCHARGE SUPPORT TEAM:  For Patients Discharge Needs & Updates  Phone: [phone] opt. 2 Fax: [phone]  Email: [email or web address]

## 2025-01-06 NOTE — ASSESSMENT & PLAN NOTE
Tested positive for influenza A- continue tamiflu  Supportive care   Now with improving productive cough

## 2025-01-06 NOTE — PROGRESS NOTES
"Progress Note - Hospitalist   Name: Martell Mejía 49 y.o. male I MRN: [id number]  Unit/Bed#: [facility] 519-01 I Date of Admission: 1/4/2025   Date of Service: 1/6/2025 I Hospital Day: 0    Assessment & Plan  Near syncope  Presented to the ER after having episodes of coughing resulting in near syncopal episodes, now noted to be most likely to influenza A  Extensive cardiac history with known cardiomyopathy which is being further evaluated by cardiology outpatient- see CHF plan below  Recently saw cardiology outpatient d/t increased cough for which he was given IV Lasix 100 mg in the clinic as well as his diuretics switched to torsemide 40 mg twice daily from Lasix 80 in the morning and 40 in the p.m.  Patient appears to be somewhat dry currently, IV diuretic received this morning will hold  Will need to monitor renal function  Patient reports that his lower extremity edema has improved although he does endorse continued episodes of coughing  recent outpatient cardiac MRI showing EF 8% and work up into the etiology of this is currently ongoing with cardiology outpatient.  Pt with echo on 12/19/24 noting ef of 15% no thrombus   EKG sinus tachycardia, right bundle branch block (seen on prior EKG), QTc 41  Trop 31-->33-->37  Denied any chest pain  Chest x-ray 1/4- no acute cardiopulmonary process  Pt reported feeling dehydrated on admission   Patient additionally reported that he is on chronic Xanax 1 mg 4 times daily as needed which he stopped taking on his own accord 2 days ago because he \"wanted to try and get off of it\"; has been on this medication for greater than 6 months  Appreciated cardiology   Monitor on tele for possible arrhythmias.    Hold diuretics as well as his Entresto. continue his beta-blocker  Discussed with cardiology  Pt noted to be flu A posit vive   Per resp he will need ambulatory sat , will order for am since pt feels she would like to leave tomorrow   Chronic combined systolic and diastolic heart " "failure (HCC)  Wt Readings from Last 3 Encounters:   01/06/25 91.6 kg (201 lb 15.1 oz)   12/31/24 97.2 kg (214 lb 3.2 oz)   12/25/24 91.7 kg (202 lb 3.2 oz)     Reviewed most recent CV outpatient note with Grace on 12/31  Most recent echo with LVEF 15% with BiV failure, nondilated LV-->new dx in October 2024  Cardiac MRI on 1/2: \"Moderately dilated left ventricle with severely reduced systolic function, EF 8%. Severe global hypokinesis with regional variation. Mildly dilated right ventricle with severely reduced systolic function, EF 12%. Moderately dilated bilateral atria. Tricuspid regurgitation visualized. Possible focal transmural scarring of the apical inferior wall, which could represent a small distal infarct. Focal late gadolinium enhancement at the inferior RV insertion point, nonspecific, but can be seen with idiopathic dilated cardiomyopathy.\"  Inferior Infarct by NST/PVCs  Appeared volume up at CV appointment per note on 12/31 and was given IV Lasix 100mg in the clinic and then PO Lasix Dc'd and switched to torsemide 40mg bid; cardiology additionally noted cardiac MRI that was ordered to further evaluate reduced LVEF  PTA on Toprol 25 mg BID, Entresto 24-26 BID  [facility]  Pt with plan to transition to [facility]   Given the concern for dehydration on admission entresto and diuretics held. C/w Toprol  Heart failure team consultation appreciated     Acute renal failure superimposed on stage 3a chronic kidney disease (HCC)  Lab Results   Component Value Date    EGFR 51 01/06/2025    EGFR 52 01/05/2025    EGFR 65 01/04/2025    CREATININE 1.56 (H) 01/06/2025    CREATININE 1.54 (H) 01/05/2025    CREATININE 1.27 01/04/2025   Baseline creatinine 1.2-1.4. creatinine today 1.56- elevated likely in the setting of dehydration   Holding entresto and diuretics   Avoid hypotension, nephrotoxins, and NSAIDS if possible    D/w HF team, will defer need for IVFs to cardiology given cardiac history "   Strict I & Os  Tetrahydrocannabinol (THC) use disorder, mild, abuse  Patient does report using THC and appears he has been informed of need to refrain from THC use  C/w encouraging ongoing cessation   Anxiety  Pt has been taking Xanax 1 mg 4 times daily PRN up until 2 days ago when he stopped it on his own accord as an attempt to get off the medication. He has been on it for greater than 6 months  While admitted will continue with Xanax 0.5 mg 3 times daily PRN  Hyponatremia   today  Likely secondary to dehydration   Entresto and diuretics on hold  Monitor- repeat am bmp   Influenza A  Tested positive for influenza A- continue tamiflu  Supportive care   Now with improving productive cough     VTE Pharmacologic Prophylaxis: VTE Score: 2 High Risk (Score >/= 5) - Pharmacological DVT Prophylaxis Ordered: heparin. Sequential Compression Devices Ordered.    Mobility:   Basic Mobility Inpatient Raw Score: 24  JH-HLM Goal: 8: Walk 250 feet or more  JH-HLM Achieved: 8: Walk 250 feet ot more  JH-HLM Goal achieved. Continue to encourage appropriate mobility.    Patient Centered Rounds: I performed bedside rounds with nursing staff today.   Discussions with Specialists or Other Care Team Provider: nursing     Education and Discussions with Family / Patient: Updated  (sister) at bedside.    Current Length of Stay: 0 day(s)  Current Patient Status: Observation   Certification Statement: The patient will continue to require additional inpatient hospital stay due to monitoring of his labs likely mild dehydration   Discharge Plan: Anticipate discharge in 48-72 hrs to home.    Code Status: Level 1 - Full Code    Subjective   Upon arrival to room patient has ongoing cough which she reports is improved with ability to expectorate.  No longer seeing stars when he coughs.  Some occasional shortness of breath but currently on room air.  Many questions in regards to his labs and his ability to go home.  He reports he  will give it another day and will need to likely go home tomorrow.  Requesting transition of care to [facility] from [facility].    Objective :  Temp:  [98.2 °F (36.8 °C)-98.3 °F (36.8 °C)] 98.2 °F (36.8 °C)  HR:  [104-129] 104  BP: ()/(66-78) 92/66  Resp:  [14-20] 14  SpO2:  [93 %-99 %] 97 %  O2 Device: None (Room air)    Body mass index is 28.98 kg/m².     Input and Output Summary (last 24 hours):     Intake/Output Summary (Last 24 hours) at 1/6/2025 1317  Last data filed at 1/6/2025 0801  Gross per 24 hour   Intake 702 ml   Output 1050 ml   Net -348 ml       Physical Exam  Constitutional:       General: He is not in acute distress.     Appearance: He is not ill-appearing, toxic-appearing or diaphoretic.   HENT:      Head: Normocephalic.      Mouth/Throat:      Pharynx: No oropharyngeal exudate.   Eyes:      General:         Right eye: No discharge.         Left eye: No discharge.   Cardiovascular:      Rate and Rhythm: Tachycardia present.      Heart sounds: Murmur heard.      No friction rub. No gallop.   Pulmonary:      Effort: No respiratory distress.      Breath sounds: No stridor. No wheezing, rhonchi or rales.   Chest:      Chest wall: No tenderness.   Abdominal:      General: There is no distension.      Palpations: There is no mass.      Tenderness: There is no abdominal tenderness. There is no guarding or rebound.      Hernia: No hernia is present.   Musculoskeletal:         General: No swelling, tenderness, deformity or signs of injury.      Right lower leg: No edema.      Left lower leg: No edema.   Skin:     Coloration: Skin is pale. Skin is not jaundiced.      Findings: No bruising, erythema, lesion or rash.   Neurological:      Mental Status: He is alert and oriented to person, place, and time.   Psychiatric:         Behavior: Behavior normal.           Lines/Drains:        Telemetry:  Telemetry Orders (From admission, onward)               24 Hour Telemetry Monitoring  Continuous x 24 Hours (Telem)         Expiring   Question:  Reason for 24 Hour Telemetry  Answer:  Syncope suspected to be cardiac in origin                     Telemetry Reviewed: Sinus Tachycardia  Indication for Continued Telemetry Use: Lifevest (remains on tele entire hospital stay)               Lab Results: I have reviewed the following results:   Results from last 7 days   Lab Units 01/06/25  0513 01/05/25  0507 01/04/25  1208   WBC Thousand/uL 6.00   < > 9.89   HEMOGLOBIN g/dL 13.8   < > 14.9   HEMATOCRIT % 42.3   < > 46.6   PLATELETS Thousands/uL 192   < > 244   SEGS PCT %  --   --  88*   LYMPHO PCT %  --   --  8*   MONO PCT %  --   --  4   EOS PCT %  --   --  0    < > = values in this interval not displayed.     Results from last 7 days   Lab Units 01/06/25  0513   SODIUM mmol/L 125*   POTASSIUM mmol/L 4.1   CHLORIDE mmol/L 90*   CO2 mmol/L 26   BUN mg/dL 26*   CREATININE mg/dL 1.56*   ANION GAP mmol/L 9   CALCIUM mg/dL 9.0   ALBUMIN g/dL 3.6   TOTAL BILIRUBIN mg/dL 1.28*   ALK PHOS U/L 69   ALT U/L 43   AST U/L 51*   GLUCOSE RANDOM mg/dL 100         Results from last 7 days   Lab Units 01/05/25  1602   POC GLUCOSE mg/dl 119               Recent Cultures (last 7 days):         Imaging Results Review: No pertinent imaging studies reviewed.  Other Study Results Review: No additional pertinent studies reviewed.    Last 24 Hours Medication List:     Current Facility-Administered Medications:     acetaminophen (TYLENOL) tablet 650 mg, Q6H PRN    albuterol (PROVENTIL HFA,VENTOLIN HFA) inhaler 2 puff, Q4H PRN    ALPRAZolam (XANAX) tablet 1 mg, TID PRN    aspirin chewable tablet 81 mg, Daily    heparin (porcine) subcutaneous injection 5,000 Units, Q8H ALEJANDRO    ipratropium (ATROVENT) 0.02 % inhalation solution 0.5 mg, TID    levalbuterol (XOPENEX) inhalation solution 1.25 mg, TID    metoprolol succinate (TOPROL-XL) 24 hr tablet 12.5 mg, BID    oseltamivir (TAMIFLU) capsule 75 mg, Q12H ALEJANDRO    pantoprazole (PROTONIX) EC tablet 40 mg, Early Morning     potassium chloride (Klor-Con M20) CR tablet 20 mEq, Daily    pravastatin (PRAVACHOL) tablet 40 mg, Daily With Dinner    Administrative Statements   Today, Patient Was Seen By: CANDY Pugh      **Please Note: This note may have been constructed using a voice recognition system.**

## 2025-01-06 NOTE — ASSESSMENT & PLAN NOTE
"Presented to the ER after having episodes of coughing resulting in near syncopal episodes, now noted to be most likely to influenza A  Extensive cardiac history with known cardiomyopathy which is being further evaluated by cardiology outpatient- see CHF plan below  Recently saw cardiology outpatient d/t increased cough for which he was given IV Lasix 100 mg in the clinic as well as his diuretics switched to torsemide 40 mg twice daily from Lasix 80 in the morning and 40 in the p.m.  Patient appears to be somewhat dry currently, IV diuretic received this morning will hold  Will need to monitor renal function  Patient reports that his lower extremity edema has improved although he does endorse continued episodes of coughing  recent outpatient cardiac MRI showing EF 8% and work up into the etiology of this is currently ongoing with cardiology outpatient.  Pt with echo on 12/19/24 noting ef of 15% no thrombus   EKG sinus tachycardia, right bundle branch block (seen on prior EKG), QTc 41  Trop 31-->33-->37  Denied any chest pain  Chest x-ray 1/4- no acute cardiopulmonary process  Pt reported feeling dehydrated on admission   Patient additionally reported that he is on chronic Xanax 1 mg 4 times daily as needed which he stopped taking on his own accord 2 days ago because he \"wanted to try and get off of it\"; has been on this medication for greater than 6 months  Appreciated cardiology   Monitor on tele for possible arrhythmias.    Hold diuretics as well as his Entresto. continue his beta-blocker  Discussed with cardiology  Pt noted to be flu A posit vive   Per resp he will need ambulatory sat , will order for am since pt feels she would like to leave tomorrow   "

## 2025-01-06 NOTE — PROGRESS NOTES
Advanced Heart Failure / Pulmonary Hypertension Service Progress Note    Martell Mejía 49 y.o. male   MRN: [id number]  Unit/Bed#: [facility] 519-01; Encounter: [id number]    Assessment:  Principal Problem:    Near syncope  Active Problems:    Anxiety    Acute renal failure superimposed on stage 3a chronic kidney disease (HCC)    Hyponatremia    Chronic combined systolic and diastolic heart failure (HCC)    Tetrahydrocannabinol (THC) use disorder, mild, abuse    Influenza A    49-year-old male with past medical history of nonischemic cardiomyopathy who presented with near syncope.  Patient woke up with a coughing fit the night before prior to admission and felt like almost passing out.  He continues to have these episodes prompting ED visit.  He was last seen in cardiology follow-up by Dr. Edwards on December 31st.  He was noted to be volume overloaded at that time with cough, shortness of breath, elevated JVP, and bilateral lower extremity edema.  He was given a dose of 100 mg IV Lasix in the office and transitioned furosemide to torsemide 40 mg twice daily.  He tested positive for influenza A.    Subjective:   Patient seen and examined. No significant events overnight. Leg swelling has resolved, breathing close to baseline.     Objective:   Intake/ Output: 1/4/2002/1025 (+377) with unmeasured occurrences  Weight: 204 > 201 lbs   Telemetry: reviewed. Multiple short runs NSVT overnight  MAPs 70s-90s    Today's Plan:  Has been getting IV diuresis; sodium drifting down and creatinine trending up.  No edema on exam today.  Trial diuretic hold.  Holding Jardiance and Entresto for now.  On reduced dose of metoprolol at 12.5 mg twice daily.  Influenza A management per primary team.  2 g sodium restriction, 2 L fluid restriction, daily weights.  Daily standing weights, strict I/Os.    Plan:  Near syncope  In setting of excessive coughing from flu and volume overload     Acute on chronic heart failure with reduced ejection  "fraction  Etiology: nonischemic. Prior  infarct noted on NPI but felt to be nonischemic  Recent decompensation on outpatient follow up and now in setting of Influenza A     BNP: 1461 12/18/24; 3563 1/5/25     Studies  Cardiac MRI:  LVEF 8%  LVEDD 72cm  Mildly dilated RV with severely reduced systolic function  Possible focal transmural scarring of the apical inferior wall, which could represent a small distal infarct. Focal late gadolinium enhancement at the inferior RV insertion point, nonspecific, but can be seen with idiopathic dilated cardiomyopathy.      TTE 12/19/24: LVEF 15%, LVIDd 5.8 cm, RV function reduced, mild to mod TR, mild MR, est RAP 10 mmHg,      11/21/2024 nuclear pharmacologic stress test performed at [facility]:  There is a fixed, large size, severe intensity perfusion defect in the basal to apical inferior, inferoseptal and inferolateral wall segments, which does not improve with pronation. In the presence of akinesis, this is suggestive of infarct.    11/26/2024 TTE at [facility]:  LVEF \"less than 20%\", global hypokinesis, indeterminate diastolic function.  No evidence of apical thrombus with Definity contrast.  R, mildly dilated, systolic function moderately reduced.  Moderately dilated LA, mildly dilated RA.  Trace MR, mild TR, mild CO  Right atrial pressure estimated at 5 to 10 mmHg     2/25/2013 TTE at [facility]:  LVEF 60%, normal RV and atria     Neurohormonal Blockade:   --Beta-Blocker: metoprolol succinate 25mg BID - on hold  --ACEi, ARB or ARNi: entresto 24-26mg BID - on hold    (or SVR reduction)  --Aldosterone Receptor Blocker: none  --SGLT2-I: Jardiance 10mg daily - on hold     Volume management:  --Home Diuretic: torsemide 40mg po BID  --Inpatient diuretic: none     Sudden Cardiac Death Risk Reduction:  --ICD: Wearing LifeVest     Electrical Resynchronization:  --Candidacy for BiV device: RBBB, -118 msec     Advanced Therapies (if appropriate):  --Inotrope:  --LVAD/Transplant Candidacy:      " "  AL  Recent baseline appears to be between 1.27-1.45, 1.54 on presentation.      Hyponatremia, likely due to CHF     H/o Frequent PVCs, NSVT  Likely due to severely reduced LV function, ?possible scar mediated given large infarct on recent nuclear stress imaging.  Continue to monitor on telemetry  Metoprolol as below     Hypercholesterolemia     Anxiety  Per primary     Gastroesophageal reflux disease with esophagitis     VSD repaired 9 months, followed CHOP x 16 years as child  No toxic habits except +THC  +FH cardiac dz young age     Influenza A positive    Vitals:   Blood pressure 99/65, pulse (!) 115, temperature 98.2 °F (36.8 °C), temperature source Oral, resp. rate 17, height 5' 10\" (1.778 m), weight 91.6 kg (201 lb 15.1 oz), SpO2 97%.    Body mass index is 28.98 kg/m².    I/O last 3 completed shifts:  In: 1402 [P.O.:1402]  Out: 1025 [Urine:1025]    Wt Readings from Last 10 Encounters:   01/06/25 91.6 kg (201 lb 15.1 oz)   12/31/24 97.2 kg (214 lb 3.2 oz)   12/25/24 91.7 kg (202 lb 3.2 oz)   09/24/24 99.9 kg (220 lb 3.2 oz)   02/27/24 103 kg (227 lb 3.2 oz)   01/09/24 99.2 kg (218 lb 9.6 oz)   12/07/23 97.8 kg (215 lb 9.6 oz)   08/09/23 103 kg (226 lb)   01/30/23 98.8 kg (217 lb 12.8 oz)   08/15/22 98.6 kg (217 lb 6.4 oz)     Vitals:    01/06/25 0700 01/06/25 1054 01/06/25 1518 01/06/25 1539   BP: 101/68 92/66  99/65   BP Location: Left arm Left arm     Pulse: (!) 115 104  (!) 115   Resp: 18 14  17   Temp: 98.2 °F (36.8 °C) 98.2 °F (36.8 °C)     TempSrc: Oral Oral     SpO2: 94% 97% (!) 83% 97%   Weight:       Height:           Physical Exam  Constitutional:       Appearance: Normal appearance.   HENT:      Head: Normocephalic.      Nose: Nose normal.      Mouth/Throat:      Mouth: Mucous membranes are moist.   Eyes:      Conjunctiva/sclera: Conjunctivae normal.   Neck:      Comments: JVP is elevated, may be related to right heart dysfunction  Cardiovascular:      Rate and Rhythm: Normal rate and regular " rhythm.   Pulmonary:      Effort: Pulmonary effort is normal.      Breath sounds: Normal breath sounds.   Musculoskeletal:      Cervical back: Neck supple.      Right lower leg: No edema.      Left lower leg: No edema.   Skin:     General: Skin is dry.   Neurological:      General: No focal deficit present.      Mental Status: He is alert and oriented to person, place, and time.   Psychiatric:         Mood and Affect: Mood normal.         Behavior: Behavior normal.           Current Facility-Administered Medications:     acetaminophen (TYLENOL) tablet 650 mg, 650 mg, Oral, Q6H PRN, Irwin Thomason DO    albuterol (PROVENTIL HFA,VENTOLIN HFA) inhaler 2 puff, 2 puff, Inhalation, Q4H PRN, Doc Bach MD, 2 puff at 01/06/25 0314    ALPRAZolam (XANAX) tablet 1 mg, 1 mg, Oral, TID PRN, Irwin Thomason DO, 1 mg at 01/05/25 1544    aspirin chewable tablet 81 mg, 81 mg, Oral, Daily, Irwin Thomason DO, 81 mg at 01/06/25 0816    heparin (porcine) subcutaneous injection 5,000 Units, 5,000 Units, Subcutaneous, Q8H ALEJANDRO, Irwin Thomason DO, 5,000 Units at 01/06/25 0504    ipratropium (ATROVENT) 0.02 % inhalation solution 0.5 mg, 0.5 mg, Nebulization, TID, Irwin Thomason DO, 0.5 mg at 01/06/25 1514    levalbuterol (XOPENEX) inhalation solution 1.25 mg, 1.25 mg, Nebulization, TID, Irwin Thomason DO, 1.25 mg at 01/06/25 1514    metoprolol succinate (TOPROL-XL) 24 hr tablet 12.5 mg, 12.5 mg, Oral, BID, Natalie Power MD, 12.5 mg at 01/06/25 0816    oseltamivir (TAMIFLU) capsule 75 mg, 75 mg, Oral, Q12H ALEJANDRO, CANDY Soto, 75 mg at 01/06/25 0505    pantoprazole (PROTONIX) EC tablet 40 mg, 40 mg, Oral, Early Morning, Irwin Thomason DO, 40 mg at 01/06/25 0505    potassium chloride (Klor-Con M20) CR tablet 20 mEq, 20 mEq, Oral, Daily, Irwin Thomason DO, 20 mEq at 01/06/25 0817    pravastatin (PRAVACHOL) tablet 40 mg, 40 mg, Oral, Daily With Dinner, Irwin Thomason DO, 40 mg at 01/05/25 1541    Labs & Results:       Results from last 7 days   Lab Units 01/06/25  0513 01/05/25  0507 01/04/25  1208   WBC Thousand/uL 6.00 8.56 9.89   HEMOGLOBIN g/dL 13.8 14.7 14.9   HEMATOCRIT % 42.3 45.4 46.6   PLATELETS Thousands/uL 192 221 244         Results from last 7 days   Lab Units 01/06/25  0513 01/05/25  0507 01/04/25  1208 01/03/25  0913   POTASSIUM mmol/L 4.1 4.5 3.7 4.4   CHLORIDE mmol/L 90* 92* 93* 96   CO2 mmol/L 26 25 29 30   BUN mg/dL 26* 19 20 23   CREATININE mg/dL 1.56* 1.54* 1.27 1.29   CALCIUM mg/dL 9.0 9.1 9.5 10.0   ALK PHOS U/L 69  --  87 86   ALT U/L 43  --  28 35   AST U/L 51*  --  15 21             Thank you for the opportunity to participate in the care of this patient.    Jenna Pizano PA-C  Advanced Heart Failure  [facility]

## 2025-01-06 NOTE — ASSESSMENT & PLAN NOTE
Lab Results   Component Value Date    EGFR 51 01/06/2025    EGFR 52 01/05/2025    EGFR 65 01/04/2025    CREATININE 1.56 (H) 01/06/2025    CREATININE 1.54 (H) 01/05/2025    CREATININE 1.27 01/04/2025   Baseline creatinine 1.2-1.4. creatinine today 1.56- elevated likely in the setting of dehydration   Holding entresto and diuretics   Avoid hypotension, nephrotoxins, and NSAIDS if possible    D/w HF team, will defer need for IVFs to cardiology given cardiac history   Strict I & Os

## 2025-01-06 NOTE — ASSESSMENT & PLAN NOTE
"Wt Readings from Last 3 Encounters:   01/06/25 91.6 kg (201 lb 15.1 oz)   12/31/24 97.2 kg (214 lb 3.2 oz)   12/25/24 91.7 kg (202 lb 3.2 oz)     Reviewed most recent CV outpatient note with Grace on 12/31  Most recent echo with LVEF 15% with BiV failure, nondilated LV-->new dx in October 2024  Cardiac MRI on 1/2: \"Moderately dilated left ventricle with severely reduced systolic function, EF 8%. Severe global hypokinesis with regional variation. Mildly dilated right ventricle with severely reduced systolic function, EF 12%. Moderately dilated bilateral atria. Tricuspid regurgitation visualized. Possible focal transmural scarring of the apical inferior wall, which could represent a small distal infarct. Focal late gadolinium enhancement at the inferior RV insertion point, nonspecific, but can be seen with idiopathic dilated cardiomyopathy.\"  Inferior Infarct by NST/PVCs  Appeared volume up at CV appointment per note on 12/31 and was given IV Lasix 100mg in the clinic and then PO Lasix Dc'd and switched to torsemide 40mg bid; cardiology additionally noted cardiac MRI that was ordered to further evaluate reduced LVEF  PTA on Toprol 25 mg BID, Entresto 24-26 BID  Currently has a LifeVest from [facility]  Pt with plan to transition to [facility]   Given the concern for dehydration on admission entresto and diuretics held. C/w Toprol  Heart failure team consultation appreciated     "

## 2025-01-07 VITALS
SYSTOLIC BLOOD PRESSURE: 108 MMHG | RESPIRATION RATE: 20 BRPM | OXYGEN SATURATION: 98 % | DIASTOLIC BLOOD PRESSURE: 74 MMHG | TEMPERATURE: 98.2 F | BODY MASS INDEX: 28.97 KG/M2 | HEART RATE: 111 BPM | WEIGHT: 202.38 LBS | HEIGHT: 70 IN

## 2025-01-07 LAB
ALBUMIN SERPL BCG-MCNC: 3.8 G/DL (ref 3.5–5)
ALP SERPL-CCNC: 77 U/L (ref 34–104)
ALT SERPL W P-5'-P-CCNC: 56 U/L (ref 7–52)
ANION GAP SERPL CALCULATED.3IONS-SCNC: 10 MMOL/L (ref 4–13)
AST SERPL W P-5'-P-CCNC: 51 U/L (ref 13–39)
BASOPHILS # BLD AUTO: 0.06 THOUSANDS/ΜL (ref 0–0.1)
BASOPHILS NFR BLD AUTO: 1 % (ref 0–1)
BILIRUB SERPL-MCNC: 1.36 MG/DL (ref 0.2–1)
BUN SERPL-MCNC: 29 MG/DL (ref 5–25)
CALCIUM SERPL-MCNC: 9.2 MG/DL (ref 8.4–10.2)
CHLORIDE SERPL-SCNC: 89 MMOL/L (ref 96–108)
CO2 SERPL-SCNC: 28 MMOL/L (ref 21–32)
CREAT SERPL-MCNC: 1.53 MG/DL (ref 0.6–1.3)
EOSINOPHIL # BLD AUTO: 0.1 THOUSAND/ΜL (ref 0–0.61)
EOSINOPHIL NFR BLD AUTO: 2 % (ref 0–6)
ERYTHROCYTE [DISTWIDTH] IN BLOOD BY AUTOMATED COUNT: 14.5 % (ref 11.6–15.1)
GFR SERPL CREATININE-BSD FRML MDRD: 52 ML/MIN/1.73SQ M
GLUCOSE SERPL-MCNC: 145 MG/DL (ref 65–140)
HCT VFR BLD AUTO: 45.1 % (ref 36.5–49.3)
HGB BLD-MCNC: 14.4 G/DL (ref 12–17)
IMM GRANULOCYTES # BLD AUTO: 0.02 THOUSAND/UL (ref 0–0.2)
IMM GRANULOCYTES NFR BLD AUTO: 0 % (ref 0–2)
LYMPHOCYTES # BLD AUTO: 2.02 THOUSANDS/ΜL (ref 0.6–4.47)
LYMPHOCYTES NFR BLD AUTO: 31 % (ref 14–44)
MCH RBC QN AUTO: 29.6 PG (ref 26.8–34.3)
MCHC RBC AUTO-ENTMCNC: 31.9 G/DL (ref 31.4–37.4)
MCV RBC AUTO: 93 FL (ref 82–98)
MONOCYTES # BLD AUTO: 0.33 THOUSAND/ΜL (ref 0.17–1.22)
MONOCYTES NFR BLD AUTO: 5 % (ref 4–12)
NEUTROPHILS # BLD AUTO: 4.09 THOUSANDS/ΜL (ref 1.85–7.62)
NEUTS SEG NFR BLD AUTO: 61 % (ref 43–75)
NRBC BLD AUTO-RTO: 0 /100 WBCS
PLATELET # BLD AUTO: 208 THOUSANDS/UL (ref 149–390)
PMV BLD AUTO: 10.7 FL (ref 8.9–12.7)
POTASSIUM SERPL-SCNC: 4.3 MMOL/L (ref 3.5–5.3)
PROT SERPL-MCNC: 6.1 G/DL (ref 6.4–8.4)
RBC # BLD AUTO: 4.86 MILLION/UL (ref 3.88–5.62)
SODIUM SERPL-SCNC: 127 MMOL/L (ref 135–147)
WBC # BLD AUTO: 6.62 THOUSAND/UL (ref 4.31–10.16)

## 2025-01-07 PROCEDURE — 80053 COMPREHEN METABOLIC PANEL: CPT | Performed by: NURSE PRACTITIONER

## 2025-01-07 PROCEDURE — 99232 SBSQ HOSP IP/OBS MODERATE 35: CPT | Performed by: INTERNAL MEDICINE

## 2025-01-07 PROCEDURE — 94760 N-INVAS EAR/PLS OXIMETRY 1: CPT

## 2025-01-07 PROCEDURE — 94664 DEMO&/EVAL PT USE INHALER: CPT

## 2025-01-07 PROCEDURE — 94640 AIRWAY INHALATION TREATMENT: CPT

## 2025-01-07 PROCEDURE — 99239 HOSP IP/OBS DSCHRG MGMT >30: CPT

## 2025-01-07 PROCEDURE — 85025 COMPLETE CBC W/AUTO DIFF WBC: CPT | Performed by: NURSE PRACTITIONER

## 2025-01-07 RX ORDER — METOPROLOL SUCCINATE 25 MG/1
12.5 TABLET, EXTENDED RELEASE ORAL 2 TIMES DAILY
Qty: 30 TABLET | Refills: 0 | Status: SHIPPED | OUTPATIENT
Start: 2025-01-07 | End: 2025-01-09

## 2025-01-07 RX ORDER — ALBUTEROL SULFATE 90 UG/1
2 INHALANT RESPIRATORY (INHALATION) EVERY 4 HOURS PRN
Qty: 18 G | Refills: 0 | Status: SHIPPED | OUTPATIENT
Start: 2025-01-07

## 2025-01-07 RX ORDER — OSELTAMIVIR PHOSPHATE 75 MG/1
75 CAPSULE ORAL EVERY 12 HOURS SCHEDULED
Qty: 4 CAPSULE | Refills: 0 | Status: SHIPPED | OUTPATIENT
Start: 2025-01-07 | End: 2025-01-08 | Stop reason: SDUPTHER

## 2025-01-07 RX ADMIN — ASPIRIN 81 MG CHEWABLE TABLET 81 MG: 81 TABLET CHEWABLE at 10:23

## 2025-01-07 RX ADMIN — IPRATROPIUM BROMIDE 0.5 MG: 0.5 SOLUTION RESPIRATORY (INHALATION) at 07:38

## 2025-01-07 RX ADMIN — ALPRAZOLAM 1 MG: 0.5 TABLET ORAL at 14:09

## 2025-01-07 RX ADMIN — PANTOPRAZOLE SODIUM 40 MG: 40 TABLET, DELAYED RELEASE ORAL at 05:05

## 2025-01-07 RX ADMIN — LEVALBUTEROL HYDROCHLORIDE 1.25 MG: 1.25 SOLUTION RESPIRATORY (INHALATION) at 14:21

## 2025-01-07 RX ADMIN — METOPROLOL SUCCINATE 12.5 MG: 25 TABLET, EXTENDED RELEASE ORAL at 10:24

## 2025-01-07 RX ADMIN — OSELTAMIVIR PHOSPHATE 75 MG: 75 CAPSULE ORAL at 05:17

## 2025-01-07 RX ADMIN — IPRATROPIUM BROMIDE 0.5 MG: 0.5 SOLUTION RESPIRATORY (INHALATION) at 14:21

## 2025-01-07 RX ADMIN — POTASSIUM CHLORIDE 20 MEQ: 1500 TABLET, EXTENDED RELEASE ORAL at 10:23

## 2025-01-07 RX ADMIN — LEVALBUTEROL HYDROCHLORIDE 1.25 MG: 1.25 SOLUTION RESPIRATORY (INHALATION) at 07:38

## 2025-01-07 RX ADMIN — HEPARIN SODIUM 5000 UNITS: 5000 INJECTION, SOLUTION INTRAVENOUS; SUBCUTANEOUS at 05:06

## 2025-01-07 NOTE — ASSESSMENT & PLAN NOTE
Lab Results   Component Value Date    EGFR 52 01/07/2025    EGFR 51 01/06/2025    EGFR 52 01/05/2025    EGFR 65 01/04/2025    EGFR 64 01/03/2025    CREATININE 1.53 (H) 01/07/2025    CREATININE 1.56 (H) 01/06/2025    CREATININE 1.54 (H) 01/05/2025    CREATININE 1.27 01/04/2025    CREATININE 1.29 01/03/2025   Baseline creatinine 1.2-1.4. creatinine today 1.56- elevated likely in the setting of dehydration   Avoid hypotension, nephrotoxins, and NSAIDS if possible    Strict I & Os  Cr still elevated prior to AMA d/c - continue to monitor BMP outpatient

## 2025-01-07 NOTE — ASSESSMENT & PLAN NOTE
"Wt Readings from Last 3 Encounters:   01/07/25 91.8 kg (202 lb 6.1 oz)   12/31/24 97.2 kg (214 lb 3.2 oz)   12/25/24 91.7 kg (202 lb 3.2 oz)     Reviewed most recent CV outpatient note with Grace on 12/31  Most recent echo with LVEF 15% with BiV failure, nondilated LV-->new dx in October 2024  Cardiac MRI on 1/2: \"Moderately dilated left ventricle with severely reduced systolic function, EF 8%. Severe global hypokinesis with regional variation. Mildly dilated right ventricle with severely reduced systolic function, EF 12%. Moderately dilated bilateral atria. Tricuspid regurgitation visualized. Possible focal transmural scarring of the apical inferior wall, which could represent a small distal infarct. Focal late gadolinium enhancement at the inferior RV insertion point, nonspecific, but can be seen with idiopathic dilated cardiomyopathy.\"  Inferior Infarct by NST/PVCs  Appeared volume up at CV appointment per note on 12/31 and was given IV Lasix 100mg in the clinic and then PO Lasix Dc'd and switched to torsemide 40mg bid; cardiology additionally noted cardiac MRI that was ordered to further evaluate reduced LVEF  PTA on Toprol 25 mg BID, Entresto 24-26 BID  Currently has a LifeVest from [facility]  Pt with plan to transition to [facility]   Given the concern for dehydration on admission entresto and diuretics held - meds restarted at discharge.   Heart failure team following - as above  "

## 2025-01-07 NOTE — ASSESSMENT & PLAN NOTE
"Presented to the ER after having episodes of coughing resulting in near syncopal episodes, now noted to be most likely to influenza A  Extensive cardiac history with known cardiomyopathy which is being further evaluated by cardiology outpatient- see CHF plan below  Recently saw cardiology outpatient d/t increased cough for which he was given IV Lasix 100 mg in the clinic as well as his diuretics switched to torsemide 40 mg twice daily from Lasix 80 in the morning and 40 in the p.m.  Monitor renal function  Patient reports that his lower extremity edema has improved although he does endorse continued episodes of coughing  recent outpatient cardiac MRI showing EF 8% and work up into the etiology of this is currently ongoing with cardiology outpatient.  Pt with echo on 12/19/24 noted ef of 15% no thrombus   EKG sinus tachycardia, right bundle branch block (seen on prior EKG), QTc 481 1/4/25  Trop 31-->33-->37  Denied any chest pain  Chest x-ray 1/4- no acute cardiopulmonary process  Pt reported feeling dehydrated on admission   Patient additionally reported that he is on chronic Xanax 1 mg 4 times daily as needed which he stopped taking on his own accord because he \"wanted to try and get off of it\"; has been on this medication for greater than 6 months  Appreciated cardiology   Monitor on tele for possible arrhythmias.    Plan to restart Entresto, jardiance, & torsemide 1/7/25 d/t Pt's plan to leave AMA.  Pt noted to be flu A positive   Per resp needed ambulatory sat - passed   "

## 2025-01-07 NOTE — DISCHARGE SUMMARY
"Discharge Summary - Hospitalist   Name: Martell Mejía 49 y.o. male I MRN: [id number]  Unit/Bed#: [facility] 519-01 I Date of Admission: 1/4/2025   Date of Service: 1/7/2025 I Hospital Day: 1     Assessment & Plan  Near syncope  Presented to the ER after having episodes of coughing resulting in near syncopal episodes, now noted to be most likely to influenza A  Extensive cardiac history with known cardiomyopathy which is being further evaluated by cardiology outpatient- see CHF plan below  Recently saw cardiology outpatient d/t increased cough for which he was given IV Lasix 100 mg in the clinic as well as his diuretics switched to torsemide 40 mg twice daily from Lasix 80 in the morning and 40 in the p.m.  Monitor renal function  Patient reports that his lower extremity edema has improved although he does endorse continued episodes of coughing  recent outpatient cardiac MRI showing EF 8% and work up into the etiology of this is currently ongoing with cardiology outpatient.  Pt with echo on 12/19/24 noted ef of 15% no thrombus   EKG sinus tachycardia, right bundle branch block (seen on prior EKG), QTc 481 1/4/25  Trop 31-->33-->37  Denied any chest pain  Chest x-ray 1/4- no acute cardiopulmonary process  Pt reported feeling dehydrated on admission   Patient additionally reported that he is on chronic Xanax 1 mg 4 times daily as needed which he stopped taking on his own accord because he \"wanted to try and get off of it\"; has been on this medication for greater than 6 months  Appreciated cardiology   Monitor on tele for possible arrhythmias.    Plan to restart Entresto, jardiance, & torsemide 1/7/25 d/t Pt's plan to leave AMA.  Pt noted to be flu A positive   Per resp needed ambulatory sat - passed   Chronic combined systolic and diastolic heart failure (HCC)  Wt Readings from Last 3 Encounters:   01/07/25 91.8 kg (202 lb 6.1 oz)   12/31/24 97.2 kg (214 lb 3.2 oz)   12/25/24 91.7 kg (202 lb 3.2 oz)     Reviewed most recent " "CV outpatient note with Grace on 12/31  Most recent echo with LVEF 15% with BiV failure, nondilated LV-->new dx in October 2024  Cardiac MRI on 1/2: \"Moderately dilated left ventricle with severely reduced systolic function, EF 8%. Severe global hypokinesis with regional variation. Mildly dilated right ventricle with severely reduced systolic function, EF 12%. Moderately dilated bilateral atria. Tricuspid regurgitation visualized. Possible focal transmural scarring of the apical inferior wall, which could represent a small distal infarct. Focal late gadolinium enhancement at the inferior RV insertion point, nonspecific, but can be seen with idiopathic dilated cardiomyopathy.\"  Inferior Infarct by NST/PVCs  Appeared volume up at CV appointment per note on 12/31 and was given IV Lasix 100mg in the clinic and then PO Lasix Dc'd and switched to torsemide 40mg bid; cardiology additionally noted cardiac MRI that was ordered to further evaluate reduced LVEF  PTA on Toprol 25 mg BID, Entresto 24-26 BID  Currently has a LifeVest from [facility]  Pt with plan to transition to [facility]   Given the concern for dehydration on admission entresto and diuretics held - meds restarted at discharge.   Heart failure team following - as above  Acute renal failure superimposed on stage 3a chronic kidney disease (HCC)  Lab Results   Component Value Date    EGFR 52 01/07/2025    EGFR 51 01/06/2025    EGFR 52 01/05/2025    EGFR 65 01/04/2025    EGFR 64 01/03/2025    CREATININE 1.53 (H) 01/07/2025    CREATININE 1.56 (H) 01/06/2025    CREATININE 1.54 (H) 01/05/2025    CREATININE 1.27 01/04/2025    CREATININE 1.29 01/03/2025   Baseline creatinine 1.2-1.4. creatinine today 1.56- elevated likely in the setting of dehydration   Avoid hypotension, nephrotoxins, and NSAIDS if possible    Strict I & Os  Cr still elevated prior to AMA d/c - continue to monitor BMP outpatient  Tetrahydrocannabinol (THC) use disorder, mild, abuse  Patient does report using " "THC and appears he has been informed of need to refrain from THC use  C/w encouraging ongoing cessation   Anxiety  Pt has been taking Xanax 1 mg 4 times daily PRN up until 2 days ago when he stopped it on his own accord as an attempt to get off the medication. He has been on it for greater than 6 months  While admitted will continue with Xanax 0.5 mg 3 times daily PRN  Hyponatremia   today  Likely secondary to dehydration   Plan to restart entresto upon discharge.  Monitor BMP - repeat bmp prior to 1/9 outpatient appt  Influenza A  Tested positive for influenza A- continue tamiflu  Supportive care   Now with improving productive cough      Medical Problems       Resolved Problems  Date Reviewed: 1/5/2025   None       Discharging Physician / Practitioner: CANDY Hernandez  PCP: Jesus Grajeda DO  Admission Date:   Admission Orders (From admission, onward)       Ordered        01/06/25 1402  INPATIENT ADMISSION  Once            01/04/25 2010  Place in Observation  Once                          Discharge Date: 01/07/25    Consultations During Hospital Stay:  Cardiology  Heart Failure  Respiratory Therapy    Procedures Performed:   Home O2 evaluation   Respiratory protocol     Significant Findings / Test Results:   XR chest PA & Lateral: \"Cardiomegaly noted. No acute pulmonary findings.\"  Xray chest: \"No acute cardiopulmonary disease.\"  Influenza A PCR: Positive  Na: 138 -> 133 -> 128 -> 125 -> 127  Cr: 1.29 -> 1.27 -> 1.54 -> 1.56 -> 1.53  BUN: 23 -> 20 -> 19 -> 26 -> 29    Incidental Findings:   None     Test Results Pending at Discharge (will require follow up):   None     Outpatient Tests Requested:  BMP prior to 1/9/25    Complications:  None    Reason for Admission: Influenza A, near syncope,    Hospital Course:   Martell Mejía is a 49 y.o. male patient who originally presented to the hospital on 1/4/2025 due to with near syncope after a coughing episode. He was last seen in cardiology follow-up by  " "Grace on December 31st. He was noted to be volume overloaded at that time with cough, shortness of breath, elevated JVP, and bilateral lower extremity edema. He was given a dose of 100 mg IV Lasix in the office and transitioned furosemide to torsemide 40 mg twice daily. He tested positive for influenza A. His hospital course is complicated with hyponatremia, likely due to CHF and AL, his baseline creatinine is 1.27-1.45. His iv lasix, jardiance, and entresto were held and hs metoprolol was decreased to 12.5 mg twice daily. He was also placed on a 2 g sodium restriction, 2 L fluid restriction, strict I& O's, and daily weights.    Pt decided to leave AMA 1/7/25. Discussed with HF team who stated \"Pt's sodium and renal function are still not at baseline, given his lab abnormalities as well as the fact that he is still tachy; he needs to be improved before he leaves. He's also not yet back on his home med regimen.\" Discussed with patient and he reported he wants to leave AMA 1/7/25. Discussed AMA form 1/7/25 and pt signed form @ 14:26. Home medications torsemide, entresto, & jardiance restarted prior to leaving and [facility] ordered outpatient to be completed prior to 1/9/25 appointment.     Please see above list of diagnoses and related plan for additional information.     Condition at Discharge: good    Discharge Day Visit / Exam:   Subjective:  Pt resting in bed. Patient denies any pain, fever, chills, CP, SOB, N/V, numbness, or tingling.   Vitals: Blood Pressure: 108/74 (01/07/25 1044)  Pulse: (!) 111 (01/07/25 1044)  Temperature: 98.2 °F (36.8 °C) (01/06/25 1054)  Temp Source: Oral (01/06/25 1054)  Respirations: 20 (01/07/25 1044)  Height: 5' 10\" (177.8 cm) (01/05/25 1439)  Weight - Scale: 91.8 kg (202 lb 6.1 oz) (01/07/25 0600)  SpO2: 98 % (01/07/25 1422)  Physical Exam   Constitutional:       General: He is not in acute distress.     Appearance: He is not ill-appearing.   Cardiovascular:      Rate and Rhythm: Regular " rhythm. Tachycardia present.      Pulses: Normal pulses.      Heart sounds: Normal heart sounds. No murmur heard.  Pulmonary:      Effort: Pulmonary effort is normal. No respiratory distress.      Breath sounds: Normal breath sounds. No wheezing or rales.   Abdominal:      General: Bowel sounds are normal. There is no distension.      Palpations: Abdomen is soft.      Tenderness: There is no abdominal tenderness.   Musculoskeletal:         General: No swelling or tenderness.   Skin:     General: Skin is warm and dry.      Findings: No erythema or rash.   Neurological:      General: No focal deficit present.      Mental Status: He is alert and oriented to person, place, and time. Mental status is at baseline.   Psychiatric:         Mood and Affect: Mood normal.         Behavior: Behavior normal.         Thought Content: Thought content normal.         Judgment: Judgment normal.     Discussion with Family: Patient declined call to .     Discharge instructions/Information to patient and family:   See after visit summary for information provided to patient and family.      Provisions for Follow-Up Care:  See after visit summary for information related to follow-up care and any pertinent home health orders.      Mobility at time of Discharge:   Basic Mobility Inpatient Raw Score: 24  JH-HLM Goal: 8: Walk 250 feet or more  JH-HLM Achieved: 8: Walk 250 feet ot more  HLM Goal achieved. Continue to encourage appropriate mobility.     Disposition:   Other: Home AMA    Planned Readmission: None    Discharge Medications:  See after visit summary for reconciled discharge medications provided to patient and/or family.      Administrative Statements   Discharge Statement:  I have spent a total time of 35 minutes in caring for this patient on the day of the visit/encounter. >30 minutes of time was spent on: Risks and benefits of tx options, Instructions for management, Patient and family education, Importance of tx  compliance, Risk factor reductions, Impressions, Counseling / Coordination of care, Documenting in the medical record, Reviewing / ordering tests, medicine, procedures  , and Communicating with other healthcare professionals .      **Please Note: This note may have been constructed using a voice recognition system**

## 2025-01-07 NOTE — UTILIZATION REVIEW
Continued Stay Review    Date: 01/07 Day 2                          Current Patient Class: Inpatient  Current Level of Care: MS    HPI:49 y.o. male initially admitted on 01/06     Assessment/Plan: Pt reports feeling better today, breathing well, on RA, no edema, tachycardic, warm on exam. Na 127 today from 125 yesterday. Renal function stable. Received a dose of IV Lasix yesterday am. Will hold Lasix today. Holding Jardiance and Entresto for now. On reduced dose of metoprolol at 12.5 mg twice daily. 2 g sodium restriction, 2 L fluid restriction, daily weights. Daily standing weights, strict I/Os. Cont Tamiflu. Mon on tele.      Medications:   Scheduled Medications:  aspirin, 81 mg, Oral, Daily  heparin (porcine), 5,000 Units, Subcutaneous, Q8H ALEJANDRO  ipratropium, 0.5 mg, Nebulization, TID  levalbuterol, 1.25 mg, Nebulization, TID  metoprolol succinate, 12.5 mg, Oral, BID  oseltamivir, 75 mg, Oral, Q12H ALEJANDRO  pantoprazole, 40 mg, Oral, Early Morning  potassium chloride, 20 mEq, Oral, Daily  pravastatin, 40 mg, Oral, Daily With Dinner      Continuous IV Infusions: none     PRN Meds:  acetaminophen, 650 mg, Oral, Q6H PRN  albuterol, 2 puff, Inhalation, Q4H PRN  ALPRAZolam, 1 mg, Oral, TID PRN 01/07 x 1      Discharge Plan: TBD    Vital Signs (last 3 days) before discharge       Date/Time Temp Pulse Resp BP MAP (mmHg) SpO2 Calculated FIO2 (%) - Nasal Cannula Nasal Cannula O2 Flow Rate (L/min) O2 Device Patient Position - Orthostatic VS [place] Coma Scale Score Pain    01/07/25 1422 -- -- -- -- -- 98 % -- -- -- -- -- --    01/07/25 10:44:09 -- 111 20 108/74 85 98 % -- -- -- -- -- --    01/07/25 1017 -- -- -- 108/67 -- -- -- -- -- Sitting -- --    01/07/25 0900 -- -- -- -- -- -- -- -- -- -- -- No Pain    01/07/25 0739 -- -- -- -- -- -- -- -- None (Room air) -- -- --    01/07/25 07:25:07 -- 102 20 104/66 79 98 % -- -- -- -- -- --    01/06/25 2055 -- -- -- -- -- -- -- -- -- -- 15 No Pain    01/06/25 1917 -- -- -- -- -- -- 28 2  L/min Nasal cannula -- -- --    01/06/25 15:39:40 -- 115 17 99/65 76 97 % -- -- -- -- -- --    01/06/25 1518 -- -- -- -- -- 83 % -- -- None (Room air) -- -- --    01/06/25 10:54:36 98.2 °F (36.8 °C) 104 14 92/66 75 97 % -- -- None (Room air) Sitting -- --    01/06/25 0815 -- -- -- -- -- -- -- -- None (Room air) -- -- No Pain    01/06/25 0700 98.2 °F (36.8 °C) 115 18 101/68 79 94 % -- -- None (Room air) Sitting -- --    01/05/25 2126 -- -- -- -- -- -- -- -- -- -- 15 No Pain    01/05/25 21:16:56 -- 114 -- 106/66 79 93 % -- -- -- -- -- --    01/05/25 1947 -- -- -- -- -- 94 % -- -- None (Room air) -- -- --    01/05/25 19:14:19 98.3 °F (36.8 °C) 126 17 101/68 79 97 % -- -- -- -- -- --    01/05/25 1626 -- -- -- -- -- 97 % -- -- None (Room air) -- -- --    01/05/25 15:28:46 98.2 °F (36.8 °C) 129 16 113/78 90 97 % -- -- None (Room air) Lying -- --    01/05/25 1510 -- -- -- -- -- 99 % -- -- None (Room air) -- -- --    01/05/25 1454 -- 120 20 96/70 -- -- -- -- -- Sitting -- --    01/05/25 1439 -- -- -- -- -- -- -- -- None (Room air) -- -- No Pain    01/05/25 1330 -- 122 -- 93/70 -- -- -- -- -- -- -- --    01/05/25 1106 -- 122 -- 90/63 73 98 % -- -- None (Room air) Sitting -- --    01/05/25 0700 -- 124 -- 109/76 88 98 % -- -- -- -- -- --    01/05/25 0600 -- 120 18 115/80 90 98 % -- -- -- -- -- No Pain    01/05/25 0500 -- 132 19 128/83 96 100 % -- -- None (Room air) -- -- No Pain    01/05/25 0400 -- 128 18 116/86 98 98 % -- -- -- -- -- --    01/04/25 2045 -- 104 -- 118/75 89 97 % -- -- -- -- 15 --    01/04/25 1915 -- 104 19 106/61 74 97 % -- -- -- -- -- --    01/04/25 1800 99.1 °F (37.3 °C) 108 20 113/71 86 99 % -- -- -- -- -- --    01/04/25 1700 -- 106 20 108/70 84 100 % -- -- -- -- -- 4    01/04/25 1610 -- -- -- -- -- -- -- -- -- -- 15 --    01/04/25 1609 -- -- -- -- -- -- -- -- None (Room air) -- -- --    01/04/25 1455 99.2 °F (37.3 °C) 102 20 97/60 74 97 % -- -- None (Room air) Sitting -- --    01/04/25 1258 -- 107 18 94/68  -- 100 % -- -- None (Room air) Sitting -- --    01/04/25 1203 98.4 °F (36.9 °C) 104 20 115/72 89 100 % -- -- None (Room air) Sitting -- 4          Weight (last 2 days) before discharge       Date/Time Weight    01/07/25 0600 91.8 (202.38)    01/07/25 0500 91.8 (202.38)    01/06/25 0501 91.6 (201.94)    01/05/25 1454 92.8 (204.6)    01/05/25 1100 93 (205.03)            Pertinent Labs/Diagnostic Results:   Radiology:  XR chest 1 view portable   Final Interpretation by Brad Kenney MD (01/04 9697)      No acute cardiopulmonary disease.            Workstation performed: [id number]           Cardiology:  ECG 12 lead   Final Result by Mono Bennett MD (01/06 1334)   Age and gender specific ECG analysis    Sinus tachycardia with occasional Premature ventricular complexes   Right atrial enlargement   Right bundle branch block   Possible Inferior infarct , age undetermined   Cannot rule out Anteroseptal infarct Abnormal ECG   When compared with ECG of 04-Jan-2025 12:00, (unconfirmed)   Premature ventricular complexes are now Present   Confirmed by Mono Bennett ([id number]) on 1/6/2025 1:34:32 PM      ECG 12 lead   Final Result by Adan Oro MD (01/05 1712)   Sinus tachycardia   Right atrial enlargement   Right bundle branch block   Left posterior fascicular block   Bifascicular block    Cannot rule out Anterior infarct (cited on or before 18-Dec-2024)   Abnormal ECG   When compared with ECG of 18-Dec-2024 17:43,   Criteria for Inferior infarct are no longer Present   Nonspecific T wave abnormality now evident in Inferior leads   QT has lengthened   Confirmed by Adan Oro ([id number]) on 1/5/2025 5:12:51 PM        GI:  No orders to display       Results from last 7 days   Lab Units 01/05/25  1121   SARS-COV-2  Negative     Results from last 7 days   Lab Units 01/07/25  0510 01/06/25  0513 01/05/25  0507 01/04/25  1208   WBC Thousand/uL 6.62 6.00 8.56 9.89   HEMOGLOBIN g/dL 14.4 13.8 14.7 14.9  "  HEMATOCRIT % 45.1 42.3 45.4 46.6   PLATELETS Thousands/uL 208 192 221 244   TOTAL NEUT ABS Thousands/µL 4.09  --   --  8.61*         Results from last 7 days   Lab Units 01/07/25  0510 01/06/25  0513 01/05/25  0507 01/04/25  1208 01/03/25  0913   SODIUM mmol/L 127* 125* 128* 133* 138   POTASSIUM mmol/L 4.3 4.1 4.5 3.7 4.4   CHLORIDE mmol/L 89* 90* 92* 93* 96   CO2 mmol/L 28 26 25 29 30   ANION GAP mmol/L 10 9 11 11 12   BUN mg/dL 29* 26* 19 20 23   CREATININE mg/dL 1.53* 1.56* 1.54* 1.27 1.29   EGFR ml/min/1.73sq m 52 51 52 65 64   CALCIUM mg/dL 9.2 9.0 9.1 9.5 10.0   MAGNESIUM mg/dL  --  2.1 1.9  --   --      Results from last 7 days   Lab Units 01/07/25  0510 01/06/25  0513 01/04/25  1208 01/03/25  0913   AST U/L 51* 51* 15 21   ALT U/L 56* 43 28 35   ALK PHOS U/L 77 69 87 86   TOTAL PROTEIN g/dL 6.1* 5.8* 6.4 6.8   ALBUMIN g/dL 3.8 3.6 4.1 4.2   TOTAL BILIRUBIN mg/dL 1.36* 1.28* 1.17* 1.14*     Results from last 7 days   Lab Units 01/05/25  1602   POC GLUCOSE mg/dl 119     Results from last 7 days   Lab Units 01/07/25  0510 01/06/25  0513 01/05/25  0507 01/04/25  1208   GLUCOSE RANDOM mg/dL 145* 100 140 77             No results found for: \"BETA-HYDROXYBUTYRATE\"                   Results from last 7 days   Lab Units 01/04/25  1920 01/04/25  1458 01/04/25  1208   HS TNI 0HR ng/L  --   --  31   HS TNI 2HR ng/L  --  32  --    HSTNI D2 ng/L  --  1  --    HS TNI 4HR ng/L 37  --   --    HSTNI D4 ng/L 6  --   --                                  Results from last 7 days   Lab Units 01/05/25  0507   BNP pg/mL 3,563*                                         Results from last 7 days   Lab Units 01/05/25  1121   INFLUENZA A PCR  Positive*   INFLUENZA B PCR  Negative   RSV PCR  Negative                                               Network Utilization Review Department  ATTENTION: Please call with any questions or concerns to [phone] and carefully listen to the prompts so that you are directed to the right person. All " voicemails are confidential.   For Discharge needs, contact Care Management DC Support Team at [phone] opt. 2  Send all requests for admission clinical reviews, approved or denied determinations and any other requests to dedicated fax number below belonging to the campus where the patient is receiving treatment. List of dedicated fax numbers for the Facilities:  FACILITY NAME UR FAX NUMBER   ADMISSION DENIALS (Administrative/Medical Necessity) [phone]   DISCHARGE SUPPORT TEAM (NETWORK) [phone]   PARENT CHILD HEALTH (Maternity/NICU/Pediatrics) [phone]   [facility] [phone]   [facility] [phone]   [facility] [phone]   [facility] [phone]   [facility] [phone]   [facility] [phone]   [facility] [phone]   [facility] [phone]   [facility] [phone]   [facility] [phone]   [facility] [phone]   [facility] [phone]

## 2025-01-07 NOTE — RESPIRATORY THERAPY NOTE
Home Oxygen Qualifying Test     Patient name: Martell Mejía        : 1975   Date of Test:  2025  Diagnosis:    Home Oxygen Test:    **Medicare Guidelines require item(s) 1-5 on all ambulatory patients or 1 and 2 on non-ambulatory patients.    1. Baseline SPO2 on Room Air at rest 99 %   If <= 88% on Room Air add O2 via NC to obtain SpO2 >=88%. If LPM needed, document LPM 0 needed to reach =>88%    SPO2 during exertion on Room Air 98  %  During exertion monitor SPO2. If SPO2 increases >=89%, do not add supplemental oxygen    SPO2 on Oxygen at Rest 98 % at 0 LPM    SPO2 during exertion on Oxygen 0 % at 0 LPM    Test performed during exertion activity.      []  Supplemental Home Oxygen is indicated.    [x]  Client does not qualify for home oxygen.    Respiratory Additional Notes- pt doesn not qualify for supplemental oxygen    Luann Tolentino, RT

## 2025-01-07 NOTE — UTILIZATION REVIEW
NOTIFICATION OF ADMISSION DISCHARGE   This is a Notification of Discharge from [facility]. Please be advised that this patient has been discharge from our facility. Below you will find the admission and discharge date and time including the patient’s disposition.   UTILIZATION REVIEW CONTACT:  Howie Segovia  Utilization   Network Utilization Review Department  Phone: [phone] x carefully listen to the prompts. All voicemails are confidential.  Email: [email or web address]     ADMISSION INFORMATION  PRESENTATION DATE: 1/4/2025  3:21 PM  OBERVATION ADMISSION DATE: 01/04/2025 2010  INPATIENT ADMISSION DATE: 1/6/25  2:03 PM   DISCHARGE DATE: 1/7/2025  3:26 PM   DISPOSITION:Left against medical advice or discontinued care    Network Utilization Review Department  ATTENTION: Please call with any questions or concerns to [phone] and carefully listen to the prompts so that you are directed to the right person. All voicemails are confidential.   For Discharge needs, contact Care Management DC Support Team at [phone] opt. 2  Send all requests for admission clinical reviews, approved or denied determinations and any other requests to dedicated fax number below belonging to the campus where the patient is receiving treatment. List of dedicated fax numbers for the Facilities:  FACILITY NAME UR FAX NUMBER   ADMISSION DENIALS (Administrative/Medical Necessity) [phone]   DISCHARGE SUPPORT TEAM ([facility]) [phone]   PARENT CHILD HEALTH (Maternity/NICU/Pediatrics) [phone]   [facility] [phone]   [facility] [phone]   [facility] [phone]   [facility] [phone]   [facility] [phone]   [facility] [phone]   [facility] [phone]   [facility]  [facility] [phone]   [facility] [phone]   [facility] [phone]   [facility] [phone]   [facility] [phone]

## 2025-01-07 NOTE — PROGRESS NOTES
Advanced Heart Failure / Pulmonary Hypertension Service Progress Note    Martell Mejía 49 y.o. male   MRN: [id number]  Unit/Bed#: [facility] 519-01; Encounter: [id number]    Assessment:  Principal Problem:    Near syncope  Active Problems:    Anxiety    Acute renal failure superimposed on stage 3a chronic kidney disease (HCC)    Hyponatremia    Chronic combined systolic and diastolic heart failure (HCC)    Tetrahydrocannabinol (THC) use disorder, mild, abuse    Influenza A    49-year-old male with past medical history of nonischemic cardiomyopathy who presented with near syncope.  Patient woke up with a coughing fit the night before prior to admission and felt like almost passing out.  He continues to have these episodes prompting ED visit.  He was last seen in cardiology follow-up by Dr. Edwards on December 31st.  He was noted to be volume overloaded at that time with cough, shortness of breath, elevated JVP, and bilateral lower extremity edema.  He was given a dose of 100 mg IV Lasix in the office and transitioned furosemide to torsemide 40 mg twice daily.  He tested positive for influenza A.    Subjective:   Patient seen and examined. No significant events overnight. Feels much better, breathing well.    Objective:   Intake/ Output: 945/1515 (-570)  Weight: 204 > 201 > 202 lbs   Telemetry: reviewed.   MAPs 70s-90s    Today's Plan:  Warm on exam.  Sodium a bit better today, renal function stable.  Did receive dose of IV Lasix yesterday morning. No edema.  Will hold diuretics today.  Holding Jardiance and Entresto for now.  On reduced dose of metoprolol at 12.5 mg twice daily.  Influenza A management per primary team.  2 g sodium restriction, 2 L fluid restriction, daily weights.  Daily standing weights, strict I/Os.    Plan:  Near syncope  In setting of excessive coughing from flu and volume overload     Acute on chronic heart failure with reduced ejection fraction  Etiology: nonischemic. Prior  infarct noted on  "NPI but felt to be nonischemic  Recent decompensation on outpatient follow up and now in setting of Influenza A     BNP: 1461 12/18/24; 3563 1/5/25     Studies  Cardiac MRI:  LVEF 8%  LVEDD 72cm  Mildly dilated RV with severely reduced systolic function  Possible focal transmural scarring of the apical inferior wall, which could represent a small distal infarct. Focal late gadolinium enhancement at the inferior RV insertion point, nonspecific, but can be seen with idiopathic dilated cardiomyopathy.      TTE 12/19/24: LVEF 15%, LVIDd 5.8 cm, RV function reduced, mild to mod TR, mild MR, est RAP 10 mmHg,      11/21/2024 nuclear pharmacologic stress test performed at [facility]:  There is a fixed, large size, severe intensity perfusion defect in the basal to apical inferior, inferoseptal and inferolateral wall segments, which does not improve with pronation. In the presence of akinesis, this is suggestive of infarct.    11/26/2024 TTE at [facility]:  LVEF \"less than 20%\", global hypokinesis, indeterminate diastolic function.  No evidence of apical thrombus with Definity contrast.  R, mildly dilated, systolic function moderately reduced.  Moderately dilated LA, mildly dilated RA.  Trace MR, mild TR, mild NV  Right atrial pressure estimated at 5 to 10 mmHg     2/25/2013 TTE at [facility]:  LVEF 60%, normal RV and atria     Neurohormonal Blockade:   --Beta-Blocker: metoprolol succinate 25mg BID - receiving 12.5 mg BID   --ACEi, ARB or ARNi: entresto 24-26mg BID - on hold    (or SVR reduction)  --Aldosterone Receptor Blocker: none  --SGLT2-I: Jardiance 10mg daily - on hold     Volume management:  --Home Diuretic: torsemide 40mg po BID  --Inpatient diuretic: none     Sudden Cardiac Death Risk Reduction:  --ICD: Wearing LifeVest     Electrical Resynchronization:  --Candidacy for BiV device: RBBB, -118 msec     Advanced Therapies (if appropriate):  --Inotrope:  --LVAD/Transplant Candidacy:        AL  Recent baseline appears to be between " "1.27-1.45, 1.54 on presentation.      Hyponatremia, likely due to CHF     H/o Frequent PVCs, NSVT  Likely due to severely reduced LV function, ?possible scar mediated given large infarct on recent nuclear stress imaging.  Continue to monitor on telemetry  Metoprolol as below     Hypercholesterolemia     Anxiety  Per primary     Gastroesophageal reflux disease with esophagitis     VSD repaired 9 months, followed [facility] x 16 years as child  No toxic habits except +THC  +FH cardiac dz young age     Influenza A positive    Vitals:   Blood pressure 104/66, pulse 102, temperature 98.2 °F (36.8 °C), temperature source Oral, resp. rate 20, height 5' 10\" (1.778 m), weight 91.8 kg (202 lb 6.1 oz), SpO2 98%.    Body mass index is 29.04 kg/m².    I/O last 3 completed shifts:  In: 1647 [P.O.:1647]  Out: 2240 [Urine:2240]    Wt Readings from Last 10 Encounters:   01/07/25 91.8 kg (202 lb 6.1 oz)   12/31/24 97.2 kg (214 lb 3.2 oz)   12/25/24 91.7 kg (202 lb 3.2 oz)   09/24/24 99.9 kg (220 lb 3.2 oz)   02/27/24 103 kg (227 lb 3.2 oz)   01/09/24 99.2 kg (218 lb 9.6 oz)   12/07/23 97.8 kg (215 lb 9.6 oz)   08/09/23 103 kg (226 lb)   01/30/23 98.8 kg (217 lb 12.8 oz)   08/15/22 98.6 kg (217 lb 6.4 oz)     Vitals:    01/06/25 1539 01/07/25 0500 01/07/25 0600 01/07/25 0725   BP: 99/65   104/66   BP Location:       Pulse: (!) 115   102   Resp: 17   20   Temp:       TempSrc:       SpO2: 97%   98%   Weight:  91.8 kg (202 lb 6.1 oz) 91.8 kg (202 lb 6.1 oz)    Height:           Physical Exam  Constitutional:       Appearance: Normal appearance.   HENT:      Head: Normocephalic.      Nose: Nose normal.      Mouth/Throat:      Mouth: Mucous membranes are moist.   Eyes:      Conjunctiva/sclera: Conjunctivae normal.   Neck:      Comments: JVP is elevated, may be related to right heart dysfunction  Cardiovascular:      Rate and Rhythm: Normal rate and regular rhythm.   Pulmonary:      Effort: Pulmonary effort is normal.      Breath sounds: Normal " breath sounds.   Musculoskeletal:      Cervical back: Neck supple.      Right lower leg: No edema.      Left lower leg: No edema.   Skin:     General: Skin is dry.   Neurological:      General: No focal deficit present.      Mental Status: He is alert and oriented to person, place, and time.   Psychiatric:         Mood and Affect: Mood normal.         Behavior: Behavior normal.           Current Facility-Administered Medications:     acetaminophen (TYLENOL) tablet 650 mg, 650 mg, Oral, Q6H PRN, Irwin Thomason DO    albuterol (PROVENTIL HFA,VENTOLIN HFA) inhaler 2 puff, 2 puff, Inhalation, Q4H PRN, Doc Bach MD, 2 puff at 01/06/25 0314    ALPRAZolam (XANAX) tablet 1 mg, 1 mg, Oral, TID PRN, Irwin Thomason DO, 1 mg at 01/06/25 2041    aspirin chewable tablet 81 mg, 81 mg, Oral, Daily, Irwin Thomason DO, 81 mg at 01/06/25 0816    heparin (porcine) subcutaneous injection 5,000 Units, 5,000 Units, Subcutaneous, Q8H ALEJANDRO, Irwin Thomason DO, 5,000 Units at 01/07/25 0506    ipratropium (ATROVENT) 0.02 % inhalation solution 0.5 mg, 0.5 mg, Nebulization, TID, Irwin Thomason DO, 0.5 mg at 01/07/25 0738    levalbuterol (XOPENEX) inhalation solution 1.25 mg, 1.25 mg, Nebulization, TID, Irwin Thomason DO, 1.25 mg at 01/07/25 0738    metoprolol succinate (TOPROL-XL) 24 hr tablet 12.5 mg, 12.5 mg, Oral, BID, Natalie Power MD, 12.5 mg at 01/06/25 1719    oseltamivir (TAMIFLU) capsule 75 mg, 75 mg, Oral, Q12H ALEJANDRO, CANDY Soto, 75 mg at 01/07/25 0517    pantoprazole (PROTONIX) EC tablet 40 mg, 40 mg, Oral, Early Morning, Irwin Thomason DO, 40 mg at 01/07/25 0505    potassium chloride (Klor-Con M20) CR tablet 20 mEq, 20 mEq, Oral, Daily, Irwin Thomason DO, 20 mEq at 01/06/25 0817    pravastatin (PRAVACHOL) tablet 40 mg, 40 mg, Oral, Daily With Dinner, Irwin Thomason DO, 40 mg at 01/06/25 1618    Labs & Results:      Results from last 7 days   Lab Units 01/07/25  0510 01/06/25  0513 01/05/25  0507   WBC  Thousand/uL 6.62 6.00 8.56   HEMOGLOBIN g/dL 14.4 13.8 14.7   HEMATOCRIT % 45.1 42.3 45.4   PLATELETS Thousands/uL 208 192 221         Results from last 7 days   Lab Units 01/07/25  0510 01/06/25  0513 01/05/25  0507 01/04/25  1208   POTASSIUM mmol/L 4.3 4.1 4.5 3.7   CHLORIDE mmol/L 89* 90* 92* 93*   CO2 mmol/L 28 26 25 29   BUN mg/dL 29* 26* 19 20   CREATININE mg/dL 1.53* 1.56* 1.54* 1.27   CALCIUM mg/dL 9.2 9.0 9.1 9.5   ALK PHOS U/L 77 69  --  87   ALT U/L 56* 43  --  28   AST U/L 51* 51*  --  15             Thank you for the opportunity to participate in the care of this patient.    Jenna Pizano PA-C  Advanced Heart Failure  [facility]

## 2025-01-07 NOTE — DISCHARGE INSTR - AVS FIRST PAGE
Diet and fluid restriction.    Please continue to weigh yourself daily, if weight > 3lbs please consult cardiology.

## 2025-01-07 NOTE — ASSESSMENT & PLAN NOTE
today  Likely secondary to dehydration   Plan to restart entresto upon discharge.  Monitor BMP - repeat bmp prior to 1/9 outpatient appt

## 2025-01-08 ENCOUNTER — TRANSITIONAL CARE MANAGEMENT (OUTPATIENT)
Age: 50
End: 2025-01-08

## 2025-01-08 ENCOUNTER — OFFICE VISIT (OUTPATIENT)
Age: 50
End: 2025-01-08
Payer: COMMERCIAL

## 2025-01-08 VITALS
WEIGHT: 212 LBS | TEMPERATURE: 98.3 F | HEIGHT: 70 IN | DIASTOLIC BLOOD PRESSURE: 70 MMHG | HEART RATE: 58 BPM | OXYGEN SATURATION: 99 % | SYSTOLIC BLOOD PRESSURE: 100 MMHG | BODY MASS INDEX: 30.35 KG/M2

## 2025-01-08 DIAGNOSIS — N18.31 ACUTE RENAL FAILURE SUPERIMPOSED ON STAGE 3A CHRONIC KIDNEY DISEASE, UNSPECIFIED ACUTE RENAL FAILURE TYPE (HCC): ICD-10-CM

## 2025-01-08 DIAGNOSIS — R55 NEAR SYNCOPE: Primary | ICD-10-CM

## 2025-01-08 DIAGNOSIS — N17.9 ACUTE RENAL FAILURE SUPERIMPOSED ON STAGE 3A CHRONIC KIDNEY DISEASE, UNSPECIFIED ACUTE RENAL FAILURE TYPE (HCC): ICD-10-CM

## 2025-01-08 DIAGNOSIS — F41.9 ANXIETY: ICD-10-CM

## 2025-01-08 DIAGNOSIS — I50.42 CHRONIC COMBINED SYSTOLIC AND DIASTOLIC HEART FAILURE (HCC): ICD-10-CM

## 2025-01-08 DIAGNOSIS — E87.1 HYPONATREMIA: ICD-10-CM

## 2025-01-08 DIAGNOSIS — J10.1 INFLUENZA A: ICD-10-CM

## 2025-01-08 DIAGNOSIS — I50.22 CHRONIC SYSTOLIC HEART FAILURE (HCC): ICD-10-CM

## 2025-01-08 PROCEDURE — 99496 TRANSJ CARE MGMT HIGH F2F 7D: CPT | Performed by: FAMILY MEDICINE

## 2025-01-08 RX ORDER — SPIRONOLACTONE 25 MG/1
12.5 TABLET ORAL DAILY
COMMUNITY
Start: 2024-11-22 | End: 2025-01-09 | Stop reason: ALTCHOICE

## 2025-01-08 RX ORDER — OSELTAMIVIR PHOSPHATE 75 MG/1
75 CAPSULE ORAL EVERY 12 HOURS SCHEDULED
Qty: 6 CAPSULE | Refills: 0 | Status: SHIPPED | OUTPATIENT
Start: 2025-01-08 | End: 2025-01-11

## 2025-01-08 RX ORDER — VALSARTAN 40 MG/1
40 TABLET ORAL DAILY
COMMUNITY
Start: 2024-12-13 | End: 2025-01-09 | Stop reason: ALTCHOICE

## 2025-01-08 RX ORDER — FUROSEMIDE 40 MG/1
20 TABLET ORAL DAILY
COMMUNITY
Start: 2024-12-05 | End: 2025-01-09 | Stop reason: ALTCHOICE

## 2025-01-08 NOTE — ASSESSMENT & PLAN NOTE
Lab Results   Component Value Date    EGFR 52 01/07/2025    EGFR 51 01/06/2025    EGFR 52 01/05/2025    CREATININE 1.53 (H) 01/07/2025    CREATININE 1.56 (H) 01/06/2025    CREATININE 1.54 (H) 01/05/2025       Orders:    CBC and differential; Future    Comprehensive metabolic panel; Future    TSH, 3rd generation with Free T4 reflex; Future    Magnesium; Future

## 2025-01-08 NOTE — ASSESSMENT & PLAN NOTE
Orders:    oseltamivir (TAMIFLU) 75 mg capsule; Take 1 capsule (75 mg total) by mouth every 12 (twelve) hours for 3 days     Inpatient

## 2025-01-08 NOTE — PROGRESS NOTES
Transition of Care Visit  Name: Martell Mejía      : 1975      MRN: [id number]  Encounter Provider: Jesus Grajeda DO  Encounter Date: 2025   Encounter department: [facility] PRIMARY CARE    Assessment & Plan  Near syncope    Orders:    CBC and differential; Future    Comprehensive metabolic panel; Future    TSH, 3rd generation with Free T4 reflex; Future    Magnesium; Future    Chronic combined systolic and diastolic heart failure (HCC)  Wt Readings from Last 3 Encounters:   25 96.2 kg (212 lb)   25 91.8 kg (202 lb 6.1 oz)   24 97.2 kg (214 lb 3.2 oz)               Orders:    CBC and differential; Future    Comprehensive metabolic panel; Future    TSH, 3rd generation with Free T4 reflex; Future    Magnesium; Future    Influenza A    Orders:    oseltamivir (TAMIFLU) 75 mg capsule; Take 1 capsule (75 mg total) by mouth every 12 (twelve) hours for 3 days    Acute renal failure superimposed on stage 3a chronic kidney disease, unspecified acute renal failure type (HCC)  Lab Results   Component Value Date    EGFR 52 2025    EGFR 51 2025    EGFR 52 2025    CREATININE 1.53 (H) 2025    CREATININE 1.56 (H) 2025    CREATININE 1.54 (H) 2025       Orders:    CBC and differential; Future    Comprehensive metabolic panel; Future    TSH, 3rd generation with Free T4 reflex; Future    Magnesium; Future    Anxiety         Hyponatremia    Orders:    CBC and differential; Future    Comprehensive metabolic panel; Future    TSH, 3rd generation with Free T4 reflex; Future    Magnesium; Future    Acute on Chronic systolic heart failure (HCC)  Wt Readings from Last 3 Encounters:   25 96.2 kg (212 lb)   25 91.8 kg (202 lb 6.1 oz)   24 97.2 kg (214 lb 3.2 oz)               Orders:    CBC and differential; Future    Comprehensive metabolic panel; Future    TSH, 3rd generation with Free T4 reflex; Future    Magnesium; Future      Depression Screening  and Follow-up Plan: Patient was screened for depression during today's encounter. They screened negative with a PHQ-2 score of 0.        History of Present Illness     Transitional Care Management Review:   Martell Mejía is a 49 y.o. male here for TCM follow up.     During the TCM phone call patient stated:  TCM Call       Date and time call was made  1/8/2025 12:27 PM    Hospital care reviewed  Records reviewed    Patient was hospitialized at  [facility]    Date of Admission  01/04/25    Date of discharge  01/07/25    Diagnosis  NEAR SYNCOPE    Disposition  Home    Were the patients medications reviewed and updated  Yes    Current Symptoms  None          TCM Call       Post hospital issues  None    Should patient be enrolled in anticoag monitoring?  No    Scheduled for follow up?  Yes    Did you obtain your prescribed medications  Yes    Do you need help managing your prescriptions or medications  No    Is transportation to your appointment needed  No    I have advised the patient to call PCP with any new or worsening symptoms  ASIF SCHWAB CMA    Living Arrangements  Family members    Support System  Family    The type of support provided  Emotional; Financial; Physical    Do you have social support  Yes, as much as I need    Are you recieving any outpatient services  No    Are you recieving home care services  No    Are you using any community resources  No    Current waiver services  No    Have you fallen in the last 12 months  No    Interperter language line needed  No    Counseling  Patient          Patient is here status post hospitalization from January 4 through 7 for near syncopal events along with chronic CHF acute renal insufficiency THC abuse anxiety hyponatremia and influenza A.  Patient does get short of breath with ambulation.  Patient only given 2 days of Tamiflu.  Minimal cough at this time.  No fevers.  Oxygen level 99% at this time.  Not 88% as noted in chief complaint.  Patient did see  "cardiology and multiple other specialist and records reviewed at the present time.  Patient will be following up with cardiology tomorrow.      Review of Systems   Constitutional:  Positive for fatigue. Negative for fever.   HENT: Negative.     Eyes: Negative.    Respiratory:  Positive for cough and shortness of breath.    Cardiovascular: Negative.    Gastrointestinal: Negative.    Endocrine: Negative.    Genitourinary: Negative.    Musculoskeletal: Negative.    Skin: Negative.    Allergic/Immunologic: Negative.    Neurological: Negative.    Hematological: Negative.    Psychiatric/Behavioral: Negative.       Objective   /70 (BP Location: Left arm, Patient Position: Sitting, Cuff Size: Large)   Pulse 58   Temp 98.3 °F (36.8 °C) (Temporal)   Ht 5' 10\" (1.778 m)   Wt 96.2 kg (212 lb)   SpO2 99%   BMI 30.42 kg/m²     Physical Exam  Vitals and nursing note reviewed.   Constitutional:       General: He is not in acute distress.     Appearance: He is well-developed. He is not ill-appearing, toxic-appearing or diaphoretic.      Comments: Appears tired   HENT:      Head: Normocephalic and atraumatic.      Right Ear: Tympanic membrane, ear canal and external ear normal.      Left Ear: Tympanic membrane, ear canal and external ear normal.      Nose: Nose normal.      Mouth/Throat:      Mouth: Mucous membranes are moist.      Pharynx: No oropharyngeal exudate or posterior oropharyngeal erythema.   Eyes:      General:         Right eye: No discharge.         Left eye: No discharge.   Neck:      Thyroid: No thyromegaly.      Vascular: No carotid bruit.      Trachea: No tracheal deviation.   Cardiovascular:      Rate and Rhythm: Normal rate and regular rhythm.      Pulses: Normal pulses.      Heart sounds: Normal heart sounds. No murmur heard.     No gallop.   Pulmonary:      Effort: Pulmonary effort is normal. No respiratory distress.      Breath sounds: Normal breath sounds. No stridor. No wheezing or rales.   Chest: "      Chest wall: No tenderness.   Musculoskeletal:         General: No tenderness or deformity. Normal range of motion.      Cervical back: Normal range of motion and neck supple.      Right lower leg: No edema.      Left lower leg: No edema.   Lymphadenopathy:      Cervical: No cervical adenopathy.   Skin:     General: Skin is warm and dry.      Coloration: Skin is not pale.      Findings: No erythema or rash.   Neurological:      Mental Status: He is alert and oriented to person, place, and time. Mental status is at baseline.      Cranial Nerves: No cranial nerve deficit.      Motor: No abnormal muscle tone.      Gait: Gait normal.   Psychiatric:         Mood and Affect: Mood normal.         Behavior: Behavior normal.         Thought Content: Thought content normal.         Judgment: Judgment normal.       Medications have been reviewed by provider in current encounter       clear conjunctiva

## 2025-01-08 NOTE — ASSESSMENT & PLAN NOTE
Orders:    CBC and differential; Future    Comprehensive metabolic panel; Future    TSH, 3rd generation with Free T4 reflex; Future    Magnesium; Future

## 2025-01-08 NOTE — ASSESSMENT & PLAN NOTE
Wt Readings from Last 3 Encounters:   01/08/25 96.2 kg (212 lb)   01/07/25 91.8 kg (202 lb 6.1 oz)   12/31/24 97.2 kg (214 lb 3.2 oz)               Orders:    CBC and differential; Future    Comprehensive metabolic panel; Future    TSH, 3rd generation with Free T4 reflex; Future    Magnesium; Future

## 2025-01-09 ENCOUNTER — OFFICE VISIT (OUTPATIENT)
Dept: CARDIOLOGY CLINIC | Facility: CLINIC | Age: 50
End: 2025-01-09
Payer: COMMERCIAL

## 2025-01-09 ENCOUNTER — APPOINTMENT (OUTPATIENT)
Dept: LAB | Facility: CLINIC | Age: 50
End: 2025-01-09
Payer: COMMERCIAL

## 2025-01-09 VITALS
DIASTOLIC BLOOD PRESSURE: 62 MMHG | SYSTOLIC BLOOD PRESSURE: 98 MMHG | BODY MASS INDEX: 29.52 KG/M2 | HEIGHT: 70 IN | OXYGEN SATURATION: 98 % | WEIGHT: 206.2 LBS | HEART RATE: 61 BPM

## 2025-01-09 DIAGNOSIS — R55 NEAR SYNCOPE: ICD-10-CM

## 2025-01-09 DIAGNOSIS — N18.31 ACUTE RENAL FAILURE SUPERIMPOSED ON STAGE 3A CHRONIC KIDNEY DISEASE, UNSPECIFIED ACUTE RENAL FAILURE TYPE (HCC): ICD-10-CM

## 2025-01-09 DIAGNOSIS — E87.1 HYPONATREMIA: ICD-10-CM

## 2025-01-09 DIAGNOSIS — I50.42 CHRONIC COMBINED SYSTOLIC AND DIASTOLIC HEART FAILURE (HCC): ICD-10-CM

## 2025-01-09 DIAGNOSIS — I50.42 CHRONIC COMBINED SYSTOLIC AND DIASTOLIC HEART FAILURE (HCC): Primary | ICD-10-CM

## 2025-01-09 DIAGNOSIS — I50.22 CHRONIC SYSTOLIC HEART FAILURE (HCC): ICD-10-CM

## 2025-01-09 DIAGNOSIS — J10.1 INFLUENZA A: ICD-10-CM

## 2025-01-09 DIAGNOSIS — N17.9 ACUTE RENAL FAILURE SUPERIMPOSED ON STAGE 3A CHRONIC KIDNEY DISEASE, UNSPECIFIED ACUTE RENAL FAILURE TYPE (HCC): ICD-10-CM

## 2025-01-09 LAB
ALBUMIN SERPL BCG-MCNC: 3.8 G/DL (ref 3.5–5)
ALP SERPL-CCNC: 107 U/L (ref 34–104)
ALT SERPL W P-5'-P-CCNC: 50 U/L (ref 7–52)
ANION GAP SERPL CALCULATED.3IONS-SCNC: 10 MMOL/L (ref 4–13)
AST SERPL W P-5'-P-CCNC: 30 U/L (ref 13–39)
BASOPHILS # BLD AUTO: 0.04 THOUSANDS/ΜL (ref 0–0.1)
BASOPHILS NFR BLD AUTO: 1 % (ref 0–1)
BILIRUB SERPL-MCNC: 1.47 MG/DL (ref 0.2–1)
BUN SERPL-MCNC: 24 MG/DL (ref 5–25)
CALCIUM SERPL-MCNC: 9 MG/DL (ref 8.4–10.2)
CHLORIDE SERPL-SCNC: 93 MMOL/L (ref 96–108)
CO2 SERPL-SCNC: 31 MMOL/L (ref 21–32)
CREAT SERPL-MCNC: 1.32 MG/DL (ref 0.6–1.3)
EOSINOPHIL # BLD AUTO: 0.08 THOUSAND/ΜL (ref 0–0.61)
EOSINOPHIL NFR BLD AUTO: 1 % (ref 0–6)
ERYTHROCYTE [DISTWIDTH] IN BLOOD BY AUTOMATED COUNT: 14.3 % (ref 11.6–15.1)
GFR SERPL CREATININE-BSD FRML MDRD: 62 ML/MIN/1.73SQ M
GLUCOSE SERPL-MCNC: 100 MG/DL (ref 65–140)
HCT VFR BLD AUTO: 48.2 % (ref 36.5–49.3)
HGB BLD-MCNC: 15.8 G/DL (ref 12–17)
IMM GRANULOCYTES # BLD AUTO: 0.04 THOUSAND/UL (ref 0–0.2)
IMM GRANULOCYTES NFR BLD AUTO: 1 % (ref 0–2)
LYMPHOCYTES # BLD AUTO: 1.72 THOUSANDS/ΜL (ref 0.6–4.47)
LYMPHOCYTES NFR BLD AUTO: 25 % (ref 14–44)
MAGNESIUM SERPL-MCNC: 1.9 MG/DL (ref 1.9–2.7)
MCH RBC QN AUTO: 30.2 PG (ref 26.8–34.3)
MCHC RBC AUTO-ENTMCNC: 32.8 G/DL (ref 31.4–37.4)
MCV RBC AUTO: 92 FL (ref 82–98)
MONOCYTES # BLD AUTO: 0.48 THOUSAND/ΜL (ref 0.17–1.22)
MONOCYTES NFR BLD AUTO: 7 % (ref 4–12)
NEUTROPHILS # BLD AUTO: 4.67 THOUSANDS/ΜL (ref 1.85–7.62)
NEUTS SEG NFR BLD AUTO: 65 % (ref 43–75)
NRBC BLD AUTO-RTO: 0 /100 WBCS
PLATELET # BLD AUTO: 222 THOUSANDS/UL (ref 149–390)
PMV BLD AUTO: 11 FL (ref 8.9–12.7)
POTASSIUM SERPL-SCNC: 4.1 MMOL/L (ref 3.5–5.3)
PROT SERPL-MCNC: 6.1 G/DL (ref 6.4–8.4)
RBC # BLD AUTO: 5.24 MILLION/UL (ref 3.88–5.62)
SODIUM SERPL-SCNC: 134 MMOL/L (ref 135–147)
TSH SERPL DL<=0.05 MIU/L-ACNC: 1.53 UIU/ML (ref 0.45–4.5)
WBC # BLD AUTO: 7.03 THOUSAND/UL (ref 4.31–10.16)

## 2025-01-09 PROCEDURE — 36415 COLL VENOUS BLD VENIPUNCTURE: CPT

## 2025-01-09 PROCEDURE — 99214 OFFICE O/P EST MOD 30 MIN: CPT | Performed by: STUDENT IN AN ORGANIZED HEALTH CARE EDUCATION/TRAINING PROGRAM

## 2025-01-09 PROCEDURE — 85025 COMPLETE CBC W/AUTO DIFF WBC: CPT

## 2025-01-09 PROCEDURE — 83735 ASSAY OF MAGNESIUM: CPT

## 2025-01-09 PROCEDURE — 80053 COMPREHEN METABOLIC PANEL: CPT

## 2025-01-09 PROCEDURE — 84443 ASSAY THYROID STIM HORMONE: CPT

## 2025-01-09 RX ORDER — TORSEMIDE 20 MG/1
40 TABLET ORAL DAILY
Qty: 60 TABLET | Refills: 17 | Status: SHIPPED | OUTPATIENT
Start: 2025-01-09 | End: 2026-07-03

## 2025-01-09 RX ORDER — METOPROLOL SUCCINATE 25 MG/1
25 TABLET, EXTENDED RELEASE ORAL DAILY
Qty: 30 TABLET | Refills: 17 | Status: SHIPPED | OUTPATIENT
Start: 2025-01-09 | End: 2026-07-03

## 2025-01-09 RX ORDER — POTASSIUM CHLORIDE 1500 MG/1
20 TABLET, EXTENDED RELEASE ORAL DAILY
Qty: 30 TABLET | Refills: 17 | Status: SHIPPED | OUTPATIENT
Start: 2025-01-09 | End: 2026-07-03

## 2025-01-09 NOTE — PROGRESS NOTES
"Advanced Heart Failure/Pulmonary Hypertension Outpatient Note - Martell Mejía 49 y.o. male MRN: [id number]    @ Encounter: [id number]    Assessment:  49 y.o. male PMH and acute problems listed later in this note (a partial list may also be included within 'assessment' section) presents for follow up.  I first met Martell Mejía 12/2024    Admitted 1/4/2025 (soon after 12/25/24 DC) with flu A + with severe cough potentially leading to syncope; he left AMA; multiple unresolved metabolic derangements on DC.  NICM, HFrEF, 15% (8% by 12/2024 CMR), dilated LV by CMR, severe biv failure  new Dx 10/2024  Inferior infarct by NST  Low burden nonspecific LGE pattern 12/2024 CMR  PVC's  RBBB  VSD repaired 9 months, followed [facility] x 16 years as child. No shunt reported on 12/2024 CMR.  No toxic habits except +THC  +FH cardiac dz young age      Today I have reviewed all pertinent labs/imaging/data including but not limited to:        Lab Units 01/07/25  0510 01/06/25  0513 01/05/25  0507   CREATININE mg/dL 1.53* 1.56* 1.54*     Results from last 7 days   Lab Units 01/07/25  0510 01/06/25  0513 01/05/25  0507   CREATININE mg/dL 1.53* 1.56* 1.54*     Lab Results   Component Value Date    K 4.3 01/07/2025     Lab Results   Component Value Date    HGBA1C 6.5 (H) 12/20/2024     Lab Results   Component Value Date    TSH 2.21 11/12/2024     Lab Results   Component Value Date    LDLCALC 107 (H) 10/30/2014     Lab Results   Component Value Date    BNP 3,563 (H) 01/05/2025      No results found for: \"NTBNP\"       TODAY'S PLAN:     01/09/25  Henrry CHAVEZ, mildly vol up by exam  Did not yet take Rx today and did not get planned bloodwork yet for his multipel metabolic derangements on DC  No new cardiac complaints, feels his cough is improving on tamiflu, has 2 days remaining; no new syncope  Soft BP    Cw torsemide 40 qd now  Potassim 20 qd  Await repeat labs    We reviewed his CMR    Strongly advised again to stop THC     Gdmt advancement " future as able  Need repeat labs now  No changes today  Mild MR  RBBB  Has lifevest through [facility]     Genetics ordered prior visit    Strict RTC precautions given    On ASA  On low dose statin    He has an advanced CM  We again Discussed disease trajectory and different pathways care may take depending on response to therapy.  This includes review of options such as inotropes, OHT or VAD evaluation if indicated.  All questions answered.    Sleep study ordered    Follow up:  With me in 1 week or sooner if symptoms evolve.  In addition to follow up with their other medical providers    Neurohormonal Blockade:  --Beta-Blocker: Toprol XL 25 mg qd  --ACEi, ARB or ARNi: Entresto 24/26 mg BID  --Aldosterone Receptor Blocker: Aldactone future maybe  --SGLT2i: Jardiance 10 mg QD    --Diuretic: as of 1/9/25 torsemide 40 qd, potassium 20 qd. In past on lower lasix doses and required in office IV diuresis and escalation.     Sudden Cardiac Death Risk Reduction:  --ICD: LVEF <20% Lifevest from [facility]  Electrical Resynchronization:  --Candidacy for BiV device:   Advanced Therapies: Will continue to monitor.   --Inotrope:   --LVAD/Transplant Candidacy: THC+    Studies:  Today I have reviewed all pertinent patient data/labs/imaging where available, including but not limited to the below studies. This includes my independent interpretation. Selected results may be displayed here but comprehensive listing is omitted for note clarity and can be found in the epic chart.    ECG.    Echo.    Stress.    Cath.    HPI:   49 y.o. male PMH and acute problems listed later in this note (a partial list may also be included within 'assessment' section) presents for follow up.  No new CP/dizziness/palpitations.  +fatigue, sob, edema, cough.  Taking all Rx  Making Less urine than he expects    Interval History:  As noted in 'plan' section above and prior epic chart notes.    No new CP/SOB/dizziness/palpitations/syncope.  No new fatigue.  No new  unintentional weight changes.  No new leg swelling, PND, pillow orthopnea.  No new fevers, chills, cough, nausea, vomiting, diarrhea, dysuria.    ROS:  10 point ROS negative except as specified in HPI/interval history    Past Medical History:   Diagnosis Date    Allergic     Anxiety     Depression     GERD (gastroesophageal reflux disease)     Hyperlipidemia     Hypertension      Patient Active Problem List   Diagnosis    Anxiety    Benign essential hypertension    Acute gastritis    Hypercholesterolemia    Irritable bowel syndrome with diarrhea    Gastroesophageal reflux disease with esophagitis    Tick bite    Allergic contact dermatitis    COVID-19    Attention deficit hyperactivity disorder (ADHD), combined type    Acute on Chronic systolic heart failure (HCC)    Acute renal failure superimposed on stage 3a chronic kidney disease (HCC)    Hyponatremia    Transaminitis    Dyspnea on exertion    Near syncope    Chronic combined systolic and diastolic heart failure (HCC)    Tetrahydrocannabinol (THC) use disorder, mild, abuse    Influenza A     No current facility-administered medications for this visit.          No Known Allergies  Social History     Socioeconomic History    Marital status: /Civil Union     Spouse name: Not on file    Number of children: Not on file    Years of education: Not on file    Highest education level: Not on file   Occupational History    Occupation: employed   Tobacco Use    Smoking status: Former     Current packs/day: 0.00     Types: Cigarettes     Quit date: 2011     Years since quittin.5    Smokeless tobacco: Never   Vaping Use    Vaping status: Never Used   Substance and Sexual Activity    Alcohol use: Yes     Comment: seldom    Drug use: Not Currently     Types: Marijuana    Sexual activity: Yes     Partners: Female   Other Topics Concern    Not on file   Social History Narrative    Not on file     Social Drivers of Health     Financial Resource Strain: Not on  "file   Food Insecurity: No Food Insecurity (2025)    Nursing - Inadequate Food Risk Classification     Worried About Running Out of Food in the Last Year: Not on file     Ran Out of Food in the Last Year: Not on file     Ran Out of Food in the Last Year: Never true   Transportation Needs: No Transportation Needs (2025)    Nursing - Transportation Risk Classification     Lack of Transportation: Not on file     Lack of Transportation: No   Physical Activity: Not on file   Stress: Not on file   Social Connections: Not on file   Intimate Partner Violence: Unknown (2025)    Nursing IPS     Feels Physically and Emotionally Safe: Not on file     Physically Hurt by Someone: Not on file     Humiliated or Emotionally Abused by Someone: Not on file     Physically Hurt by Someone: No     Hurt or Threatened by Someone: No   Housing Stability: Unknown (2025)    Nursing: Inadequate Housing Risk Classification     Has Housing: Not on file     Worried About Losing Housing: Not on file     Unable to Get Utilities: Not on file     Unable to Pay for Housing in the Last Year: No     Has Housin     Family History   Problem Relation Age of Onset    No Known Problems Mother     Diabetes Father        Physical Exam:  Vitals:    25 1133   BP: 98/62   BP Location: Left arm   Patient Position: Sitting   Cuff Size: Standard   Pulse: 61   SpO2: 98%   Weight: 93.5 kg (206 lb 3.2 oz)   Height: 5' 10\" (1.778 m)     Constitutional: NAD, non toxic, speaks easily  Ears/nose/mouth/throat: atraumatic  CV: RRR, +HJR  Resp: CTABL  GI: Soft, NTND  MSK: no swollen joints in exposed areas  Extr: +1 LE edema, lukewarm LE  Pysche: Normal affect  Neuro: appropriate in conversation  Skin: dry and intact in exposed areas    Labs & Results:  Lab Results   Component Value Date    SODIUM 127 (L) 2025    K 4.3 2025    CL 89 (L) 2025    CO2 28 2025    BUN 29 (H) 2025    CREATININE 1.53 (H) 2025    GLUC 145 " (H) 01/07/2025    CALCIUM 9.2 01/07/2025     Lab Results   Component Value Date    WBC 6.62 01/07/2025    HGB 14.4 01/07/2025    HCT 45.1 01/07/2025    MCV 93 01/07/2025     01/07/2025       Counseling / Coordination of Care  Greater than 50% of total time was spent with the patient and / or family counseling and / or coordination of care.  Discussion included diagnoses, most recent studies, tests and any changes in treatment plan.    Thank you for the opportunity to participate in the care of this patient.    Jian Edwards MD  Attending Physician  Advanced Heart Failure and Transplant Cardiology  [facility]

## 2025-01-09 NOTE — LETTER
January 9, 2025     Patient: Martell Mejía  YOB: 1975  Date of Visit: 1/9/2025      To Whom it May Concern:    Martell Mejía is under my professional care. Martell was seen in my office on 1/9/2025. Martell may not yet return to work. This will be reassessed on an ongoing basis and depends on his progress. Our next visit is 1/16/25 and this will be revisited.    If you have any questions or concerns, please don't hesitate to call.         Sincerely,          Jian Edwards MD        CC: No Recipients

## 2025-01-15 NOTE — PROGRESS NOTES
"Advanced Heart Failure/Pulmonary Hypertension Outpatient Note - Martell Mejía 50 y.o. male MRN: [id number]    @ Encounter: [id number]    Assessment:  50 y.o. male PMH and acute problems listed later in this note (a partial list may also be included within 'assessment' section) presents for follow up.  I first met Martell Mejía 12/2024    Admitted 1/4/2025 (soon after 12/25/24 DC) with flu A + with severe cough potentially leading to syncope; he left AMA; multiple unresolved metabolic derangements on DC.  NICM, HFrEF, 15% (8% by 12/2024 CMR), dilated LV by CMR, severe biv failure  new Dx 10/2024  Inferior infarct by NST  Low burden nonspecific LGE pattern 12/2024 CMR.  Minimal incidental CAC on CTA PE.  HEART/GREAT VESSELS: Mild cardiomegaly. No thoracic aortic aneurysm. 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   PVC's  RBBB  VSD repaired 9 months, followed [facility] x 16 years as child. No shunt reported on 12/2024 CMR.  No toxic habits except +THC  +FH cardiac dz young age      Today I have reviewed all pertinent labs/imaging/data including but not limited to:        Lab Units 01/09/25  1238 01/07/25  0510 01/06/25  0513   CREATININE mg/dL 1.32* 1.53* 1.56*     Results from last 7 days   Lab Units 01/09/25  1238   CREATININE mg/dL 1.32*     Lab Results   Component Value Date    K 4.1 01/09/2025     Lab Results   Component Value Date    HGBA1C 6.5 (H) 12/20/2024     Lab Results   Component Value Date    XMW4YLHWGWJE 1.535 01/09/2025    TSH 2.21 11/12/2024     Lab Results   Component Value Date    LDLCALC 107 (H) 10/30/2014     Lab Results   Component Value Date    BNP 3,563 (H) 01/05/2025      No results found for: \"NTBNP\"     Home scale dry weight 91kg    TODAY'S PLAN:     01/16/25  Warm, euvolemic  No new cardiac complaints, slight improvement in SOB and decreased but ongoing cough in last 1 week  Home wt stable 91kg  Home SBP at BL hypotension range - high 80s-90s; no dizziness, no " syncope  His repeat BMP improved from his prior inpt BMP  He has significantly reduced THC use  He has noted major mold growth in his apartment and has moved to Mom's house    He will call with update about cough in 1 week  May need to stop arni if not improving, though suspect multifactorial now including recent flu, congestion, mold exposure    Fu PCP for questions about mold exposure and 'mass like' structure in lungs noted on 12/2024 CT chest.    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  In addition to follow up with their other medical providers    Key info from my prior notes:    We reviewed his CMR    Strongly advised again to stop THC    He has an advanced CM  We again Discussed disease trajectory and different pathways care may take depending on response to therapy.  This includes review of options such as inotropes, OHT or VAD evaluation if indicated.  All questions answered.    Sleep study ordered    Neurohormonal Blockade:  --Beta-Blocker: Toprol XL 25 mg qd  --ACEi, ARB or ARNi: Entresto 24/26 mg BID  --Aldosterone Receptor Blocker: Aldactone future maybe  --SGLT2i: Jardiance 10 mg QD    --Diuretic: cw torsemide 40 qd, potassium 20 qd. In past on lower po lasix doses had required in office IV diuresis and escalation.     Sudden Cardiac Death Risk Reduction:  --ICD: LVEF <20% Lifevest from [facility]  Electrical Resynchronization:  --Candidacy for BiV device:   Advanced Therapies: Will continue to monitor.   --Inotrope:   --LVAD/Transplant Candidacy: THC+    Studies:  Today I have reviewed all pertinent  patient data/labs/imaging where available, including but not limited to the below studies. This includes my independent interpretation. Selected results may be displayed here but comprehensive listing is omitted for note clarity and can be found in the epic chart.    ECG.    Echo.    Stress.    Cath.    HPI:   50 y.o. male PMH and acute problems listed later in this note (a partial list may also be included within 'assessment' section) presents for follow up.  No new CP/dizziness/palpitations.  +fatigue, sob, edema, cough.  Taking all Rx  Making Less urine than he expects    Interval History:  As noted in 'plan' section above and prior epic chart notes.    No new CP/SOB/dizziness/palpitations/syncope.  No new fatigue.  No new unintentional weight changes.  No new leg swelling, PND, pillow orthopnea.  No new fevers, chills, cough, nausea, vomiting, diarrhea, dysuria.    ROS:  10 point ROS negative except as specified in HPI/interval history    Past Medical History:   Diagnosis Date    Allergic     Anxiety     Depression     GERD (gastroesophageal reflux disease)     Hyperlipidemia     Hypertension      Patient Active Problem List   Diagnosis    Anxiety    Benign essential hypertension    Acute gastritis    Hypercholesterolemia    Irritable bowel syndrome with diarrhea    Gastroesophageal reflux disease with esophagitis    Tick bite    Allergic contact dermatitis    COVID-19    Attention deficit hyperactivity disorder (ADHD), combined type    Acute on Chronic systolic heart failure (HCC)    Acute renal failure superimposed on stage 3a chronic kidney disease (HCC)    Hyponatremia    Transaminitis    Dyspnea on exertion    Near syncope    Chronic combined systolic and diastolic heart failure (HCC)    Tetrahydrocannabinol (THC) use disorder, mild, abuse    Influenza A     No current facility-administered medications for this visit.          No Known Allergies  Social History     Socioeconomic History    Marital status:  /Civil Union     Spouse name: Not on file    Number of children: Not on file    Years of education: Not on file    Highest education level: Not on file   Occupational History    Occupation: employed   Tobacco Use    Smoking status: Former     Current packs/day: 0.00     Types: Cigarettes     Quit date: 2011     Years since quittin.5    Smokeless tobacco: Never   Vaping Use    Vaping status: Never Used   Substance and Sexual Activity    Alcohol use: Yes     Comment: seldom    Drug use: Not Currently     Types: Marijuana    Sexual activity: Yes     Partners: Female   Other Topics Concern    Not on file   Social History Narrative    Not on file     Social Drivers of Health     Financial Resource Strain: Not on file   Food Insecurity: No Food Insecurity (2025)    Nursing - Inadequate Food Risk Classification     Worried About Running Out of Food in the Last Year: Not on file     Ran Out of Food in the Last Year: Not on file     Ran Out of Food in the Last Year: Never true   Transportation Needs: No Transportation Needs (2025)    Nursing - Transportation Risk Classification     Lack of Transportation: Not on file     Lack of Transportation: No   Physical Activity: Not on file   Stress: Not on file   Social Connections: Not on file   Intimate Partner Violence: Unknown (2025)    Nursing IPS     Feels Physically and Emotionally Safe: Not on file     Physically Hurt by Someone: Not on file     Humiliated or Emotionally Abused by Someone: Not on file     Physically Hurt by Someone: No     Hurt or Threatened by Someone: No   Housing Stability: Unknown (2025)    Nursing: Inadequate Housing Risk Classification     Has Housing: Not on file     Worried About Losing Housing: Not on file     Unable to Get Utilities: Not on file     Unable to Pay for Housing in the Last Year: No     Has Housin     Family History   Problem Relation Age of Onset    No Known Problems Mother     Diabetes Father   "      Physical Exam:  Vitals:    01/16/25 1116   BP: 98/60   BP Location: Right arm   Patient Position: Sitting   Cuff Size: Standard   Pulse: 69   SpO2: 96%   Weight: 94 kg (207 lb 3.2 oz)   Height: 5' 10\" (1.778 m)       Constitutional: NAD, non toxic, speaks easily  Ears/nose/mouth/throat: atraumatic  CV: RRR, no JVD, no HJR  Resp: CTABL  GI: Soft, NTND  MSK: no swollen joints in exposed areas  Extr: +1 LE edema, warm LE  Pysche: Normal affect  Neuro: appropriate in conversation  Skin: dry and intact in exposed areas    Labs & Results:  Lab Results   Component Value Date    SODIUM 134 (L) 01/09/2025    K 4.1 01/09/2025    CL 93 (L) 01/09/2025    CO2 31 01/09/2025    BUN 24 01/09/2025    CREATININE 1.32 (H) 01/09/2025    GLUC 100 01/09/2025    CALCIUM 9.0 01/09/2025     Lab Results   Component Value Date    WBC 7.03 01/09/2025    HGB 15.8 01/09/2025    HCT 48.2 01/09/2025    MCV 92 01/09/2025     01/09/2025       Counseling / Coordination of Care  Greater than 50% of total time was spent with the patient and / or family counseling and / or coordination of care.  Discussion included diagnoses, most recent studies, tests and any changes in treatment plan.    Thank you for the opportunity to participate in the care of this patient.    Jian Edwards MD  Attending Physician  Advanced Heart Failure and Transplant Cardiology  [facility]  "

## 2025-01-16 ENCOUNTER — OFFICE VISIT (OUTPATIENT)
Dept: CARDIOLOGY CLINIC | Facility: CLINIC | Age: 50
End: 2025-01-16
Payer: COMMERCIAL

## 2025-01-16 ENCOUNTER — TELEPHONE (OUTPATIENT)
Age: 50
End: 2025-01-16

## 2025-01-16 VITALS
HEART RATE: 69 BPM | OXYGEN SATURATION: 96 % | BODY MASS INDEX: 29.66 KG/M2 | SYSTOLIC BLOOD PRESSURE: 98 MMHG | HEIGHT: 70 IN | DIASTOLIC BLOOD PRESSURE: 60 MMHG | WEIGHT: 207.2 LBS

## 2025-01-16 DIAGNOSIS — Q21.0 VSD (VENTRICULAR SEPTAL DEFECT): ICD-10-CM

## 2025-01-16 DIAGNOSIS — I50.42 CHRONIC COMBINED SYSTOLIC AND DIASTOLIC HEART FAILURE (HCC): Primary | ICD-10-CM

## 2025-01-16 DIAGNOSIS — J10.1 INFLUENZA A: ICD-10-CM

## 2025-01-16 DIAGNOSIS — E87.1 HYPONATREMIA: ICD-10-CM

## 2025-01-16 PROCEDURE — 99214 OFFICE O/P EST MOD 30 MIN: CPT | Performed by: STUDENT IN AN ORGANIZED HEALTH CARE EDUCATION/TRAINING PROGRAM

## 2025-01-16 NOTE — LETTER
January 16, 2025     Patient: Martlel Mejía  YOB: 1975  Date of Visit: 1/16/2025      To Whom it May Concern:    Martell Mejía is under my professional care. Martell was seen in my office on 1/16/2025. Martell will be reassessed in the next 3-4 weeks for timing of return to work.    If you have any questions or concerns, please don't hesitate to call.         Sincerely,          Jian Edwards MD        CC: No Recipients

## 2025-01-20 LAB
APOB+LDLR+PCSK9 GENE MUT ANL BLD/T: NOT DETECTED
BRCA1+BRCA2 DEL+DUP + FULL MUT ANL BLD/T: NOT DETECTED
MLH1+MSH2+MSH6+PMS2 GN DEL+DUP+FUL M: NOT DETECTED
